# Patient Record
Sex: FEMALE | Race: WHITE | NOT HISPANIC OR LATINO | Employment: FULL TIME | URBAN - METROPOLITAN AREA
[De-identification: names, ages, dates, MRNs, and addresses within clinical notes are randomized per-mention and may not be internally consistent; named-entity substitution may affect disease eponyms.]

---

## 2017-03-27 ENCOUNTER — ALLSCRIPTS OFFICE VISIT (OUTPATIENT)
Dept: OTHER | Facility: OTHER | Age: 27
End: 2017-03-27

## 2017-04-10 ENCOUNTER — ALLSCRIPTS OFFICE VISIT (OUTPATIENT)
Dept: OTHER | Facility: OTHER | Age: 27
End: 2017-04-10

## 2017-04-10 DIAGNOSIS — Z34.01 ENCOUNTER FOR SUPERVISION OF NORMAL FIRST PREGNANCY IN FIRST TRIMESTER: ICD-10-CM

## 2017-04-10 DIAGNOSIS — Z83.3 FAMILY HISTORY OF DIABETES MELLITUS: ICD-10-CM

## 2017-04-11 LAB
EXTERNAL ABO GROUPING: NORMAL
EXTERNAL ANTIBODY SCREEN: NORMAL
EXTERNAL HEMATOCRIT: 37.5 %
EXTERNAL HEMOGLOBIN: 12.2 G/DL
EXTERNAL PLATELET COUNT: 203 K/ΜL
EXTERNAL RH FACTOR: POSITIVE
EXTERNAL RUBELLA IGG QUANTITATION: NORMAL

## 2017-04-12 ENCOUNTER — LAB CONVERSION - ENCOUNTER (OUTPATIENT)
Dept: OTHER | Facility: OTHER | Age: 27
End: 2017-04-12

## 2017-04-12 LAB
ABO GROUP BLD: NORMAL
BASOPHILS # BLD AUTO: 0.2 %
BASOPHILS # BLD AUTO: 15 CELLS/UL (ref 0–200)
BILIRUB UR QL STRIP: NEGATIVE
COLOR UR: YELLOW
COMMENT (HISTORICAL): CLEAR
DEPRECATED RDW RBC AUTO: 13.6 % (ref 11–15)
EOSINOPHIL # BLD AUTO: 1.2 %
EOSINOPHIL # BLD AUTO: 91 CELLS/UL (ref 15–500)
FECAL OCCULT BLOOD DIAGNOSTIC (HISTORICAL): NEGATIVE
GLUCOSE (HISTORICAL): NEGATIVE
GLUCOSE 1 HR 50 GM GLUC CHALLENGE-PREG PTS (HISTORICAL): 77 MG/DL
HCT VFR BLD AUTO: 37.5 % (ref 35–45)
HEPATITIS B SURFACE ANTIGEN (HISTORICAL): NORMAL
HGB BLD-MCNC: 12.2 G/DL (ref 11.7–15.5)
HIV AG/AB, 4TH GEN (HISTORICAL): NORMAL
KETONES UR STRIP-MCNC: NEGATIVE MG/DL
LEUKOCYTE ESTERASE UR QL STRIP: NEGATIVE
LYMPHOCYTES # BLD AUTO: 2257 CELLS/UL (ref 850–3900)
LYMPHOCYTES # BLD AUTO: 29.7 %
MCH RBC QN AUTO: 28.5 PG (ref 27–33)
MCHC RBC AUTO-ENTMCNC: 32.6 G/DL (ref 32–36)
MCV RBC AUTO: 87.6 FL (ref 80–100)
MONOCYTES # BLD AUTO: 578 CELLS/UL (ref 200–950)
MONOCYTES (HISTORICAL): 7.6 %
NEUTROPHILS # BLD AUTO: 4659 CELLS/UL (ref 1500–7800)
NEUTROPHILS # BLD AUTO: 61.3 %
NITRITE UR QL STRIP: NEGATIVE
PH UR STRIP.AUTO: 6.5 [PH] (ref 5–8)
PLATELET # BLD AUTO: 203 THOUSAND/UL (ref 140–400)
PMV BLD AUTO: 10.3 FL (ref 7.5–12.5)
PROT UR STRIP-MCNC: NEGATIVE MG/DL
RBC # BLD AUTO: 4.28 MILLION/UL (ref 3.8–5.1)
RH BLD: NORMAL
RPR SCREEN (HISTORICAL): NORMAL
RUBELLA, IGG (HISTORICAL): 5.56 INDEX
SP GR UR STRIP.AUTO: 1.02 (ref 1–1.03)
WBC # BLD AUTO: 7.6 THOUSAND/UL (ref 3.8–10.8)

## 2017-04-24 ENCOUNTER — LAB REQUISITION (OUTPATIENT)
Dept: LAB | Facility: HOSPITAL | Age: 27
End: 2017-04-24
Payer: COMMERCIAL

## 2017-04-24 ENCOUNTER — GENERIC CONVERSION - ENCOUNTER (OUTPATIENT)
Dept: OTHER | Facility: OTHER | Age: 27
End: 2017-04-24

## 2017-04-24 DIAGNOSIS — Z34.01 ENCOUNTER FOR SUPERVISION OF NORMAL FIRST PREGNANCY IN FIRST TRIMESTER: ICD-10-CM

## 2017-04-24 PROCEDURE — 87591 N.GONORRHOEAE DNA AMP PROB: CPT | Performed by: OBSTETRICS & GYNECOLOGY

## 2017-04-24 PROCEDURE — 87491 CHLMYD TRACH DNA AMP PROBE: CPT | Performed by: OBSTETRICS & GYNECOLOGY

## 2017-04-24 PROCEDURE — G0145 SCR C/V CYTO,THINLAYER,RESCR: HCPCS | Performed by: OBSTETRICS & GYNECOLOGY

## 2017-04-26 LAB
CHLAMYDIA DNA CVX QL NAA+PROBE: NORMAL
N GONORRHOEA DNA GENITAL QL NAA+PROBE: NORMAL

## 2017-05-01 LAB
LAB AP GYN PRIMARY INTERPRETATION: NORMAL
LAB AP LMP: NORMAL
Lab: NORMAL

## 2017-05-02 ENCOUNTER — GENERIC CONVERSION - ENCOUNTER (OUTPATIENT)
Dept: OTHER | Facility: OTHER | Age: 27
End: 2017-05-02

## 2017-05-02 ENCOUNTER — ALLSCRIPTS OFFICE VISIT (OUTPATIENT)
Dept: PERINATAL CARE | Facility: CLINIC | Age: 27
End: 2017-05-02
Payer: COMMERCIAL

## 2017-05-02 PROCEDURE — 76801 OB US < 14 WKS SINGLE FETUS: CPT | Performed by: OBSTETRICS & GYNECOLOGY

## 2017-05-14 ENCOUNTER — HOSPITAL ENCOUNTER (EMERGENCY)
Facility: HOSPITAL | Age: 27
Discharge: HOME | End: 2017-05-14
Attending: EMERGENCY MEDICINE | Admitting: EMERGENCY MEDICINE
Payer: OTHER MISCELLANEOUS

## 2017-05-14 VITALS
OXYGEN SATURATION: 100 % | BODY MASS INDEX: 24.75 KG/M2 | WEIGHT: 145 LBS | HEIGHT: 64 IN | SYSTOLIC BLOOD PRESSURE: 124 MMHG | RESPIRATION RATE: 18 BRPM | DIASTOLIC BLOOD PRESSURE: 73 MMHG | HEART RATE: 64 BPM | TEMPERATURE: 98.3 F

## 2017-05-14 DIAGNOSIS — Z77.21 EXPOSURE TO BLOOD OR BODY FLUID: Primary | ICD-10-CM

## 2017-05-14 LAB
ALBUMIN SERPL BCP-MCNC: 3.5 G/DL (ref 3.5–5)
ALP SERPL-CCNC: 31 U/L (ref 46–116)
ALT SERPL W P-5'-P-CCNC: 20 U/L (ref 12–78)
ALT SERPL W P-5'-P-CCNC: 20 U/L (ref 12–78)
ANION GAP SERPL CALCULATED.3IONS-SCNC: 11 MMOL/L (ref 4–13)
AST SERPL W P-5'-P-CCNC: 19 U/L (ref 5–45)
BASOPHILS # BLD AUTO: 0.02 THOUSANDS/ΜL (ref 0–0.1)
BASOPHILS NFR BLD AUTO: 0 % (ref 0–1)
BILIRUB SERPL-MCNC: 0.2 MG/DL (ref 0.2–1)
BUN SERPL-MCNC: 12 MG/DL (ref 5–25)
CALCIUM SERPL-MCNC: 9.1 MG/DL (ref 8.3–10.1)
CHLORIDE SERPL-SCNC: 104 MMOL/L (ref 100–108)
CO2 SERPL-SCNC: 22 MMOL/L (ref 21–32)
CREAT SERPL-MCNC: 0.44 MG/DL (ref 0.6–1.3)
EOSINOPHIL # BLD AUTO: 0.05 THOUSAND/ΜL (ref 0–0.61)
EOSINOPHIL NFR BLD AUTO: 1 % (ref 0–6)
ERYTHROCYTE [DISTWIDTH] IN BLOOD BY AUTOMATED COUNT: 13.7 % (ref 11.6–15.1)
GFR SERPL CREATININE-BSD FRML MDRD: >60 ML/MIN/1.73SQ M
GLUCOSE SERPL-MCNC: 93 MG/DL (ref 65–140)
HCT VFR BLD AUTO: 35.9 % (ref 34.8–46.1)
HGB BLD-MCNC: 11.8 G/DL (ref 11.5–15.4)
LYMPHOCYTES # BLD AUTO: 2.08 THOUSANDS/ΜL (ref 0.6–4.47)
LYMPHOCYTES NFR BLD AUTO: 23 % (ref 14–44)
MCH RBC QN AUTO: 28.5 PG (ref 26.8–34.3)
MCHC RBC AUTO-ENTMCNC: 32.9 G/DL (ref 31.4–37.4)
MCV RBC AUTO: 87 FL (ref 82–98)
MONOCYTES # BLD AUTO: 0.59 THOUSAND/ΜL (ref 0.17–1.22)
MONOCYTES NFR BLD AUTO: 7 % (ref 4–12)
NEUTROPHILS # BLD AUTO: 6.33 THOUSANDS/ΜL (ref 1.85–7.62)
NEUTS SEG NFR BLD AUTO: 69 % (ref 43–75)
NRBC BLD AUTO-RTO: 0 /100 WBCS
PLATELET # BLD AUTO: 206 THOUSANDS/UL (ref 149–390)
PMV BLD AUTO: 10.6 FL (ref 8.9–12.7)
POTASSIUM SERPL-SCNC: 3.8 MMOL/L (ref 3.5–5.3)
PROT SERPL-MCNC: 7.4 G/DL (ref 6.4–8.2)
RBC # BLD AUTO: 4.14 MILLION/UL (ref 3.81–5.12)
SODIUM SERPL-SCNC: 137 MMOL/L (ref 136–145)
WBC # BLD AUTO: 9.13 THOUSAND/UL (ref 4.31–10.16)

## 2017-05-14 PROCEDURE — 86706 HEP B SURFACE ANTIBODY: CPT | Performed by: EMERGENCY MEDICINE

## 2017-05-14 PROCEDURE — 80053 COMPREHEN METABOLIC PANEL: CPT | Performed by: EMERGENCY MEDICINE

## 2017-05-14 PROCEDURE — 85025 COMPLETE CBC W/AUTO DIFF WBC: CPT | Performed by: EMERGENCY MEDICINE

## 2017-05-14 PROCEDURE — 99283 EMERGENCY DEPT VISIT LOW MDM: CPT

## 2017-05-14 PROCEDURE — 87340 HEPATITIS B SURFACE AG IA: CPT | Performed by: EMERGENCY MEDICINE

## 2017-05-14 PROCEDURE — 84460 ALANINE AMINO (ALT) (SGPT): CPT | Performed by: EMERGENCY MEDICINE

## 2017-05-14 PROCEDURE — 87389 HIV-1 AG W/HIV-1&-2 AB AG IA: CPT | Performed by: EMERGENCY MEDICINE

## 2017-05-14 PROCEDURE — 36415 COLL VENOUS BLD VENIPUNCTURE: CPT | Performed by: EMERGENCY MEDICINE

## 2017-05-14 PROCEDURE — 86803 HEPATITIS C AB TEST: CPT | Performed by: EMERGENCY MEDICINE

## 2017-05-14 RX ORDER — EMTRICITABINE AND TENOFOVIR DISOPROXIL FUMARATE 200; 300 MG/1; MG/1
1 TABLET, FILM COATED ORAL DAILY
Qty: 30 TABLET | Refills: 0 | Status: SHIPPED | OUTPATIENT
Start: 2017-05-14 | End: 2017-11-03 | Stop reason: HOSPADM

## 2017-05-14 RX ADMIN — RALTEGRAVIR 400 MG: 400 TABLET, FILM COATED ORAL at 22:39

## 2017-05-14 RX ADMIN — EMTRICITABINE: 200 CAPSULE ORAL at 22:45

## 2017-05-15 LAB — HIV 1+2 AB+HIV1 P24 AG SERPL QL IA: NORMAL

## 2017-05-16 LAB
HBV SURFACE AB SER-ACNC: 79.9 MIU/ML
HBV SURFACE AG SER QL: NORMAL
HCV AB SER QL: NORMAL

## 2017-05-25 ENCOUNTER — ALLSCRIPTS OFFICE VISIT (OUTPATIENT)
Dept: OTHER | Facility: OTHER | Age: 27
End: 2017-05-25

## 2017-06-19 ENCOUNTER — GENERIC CONVERSION - ENCOUNTER (OUTPATIENT)
Dept: OTHER | Facility: OTHER | Age: 27
End: 2017-06-19

## 2017-06-19 ENCOUNTER — ALLSCRIPTS OFFICE VISIT (OUTPATIENT)
Dept: PERINATAL CARE | Facility: CLINIC | Age: 27
End: 2017-06-19
Payer: COMMERCIAL

## 2017-06-19 PROCEDURE — 76805 OB US >/= 14 WKS SNGL FETUS: CPT | Performed by: OBSTETRICS & GYNECOLOGY

## 2017-06-19 PROCEDURE — 76817 TRANSVAGINAL US OBSTETRIC: CPT | Performed by: OBSTETRICS & GYNECOLOGY

## 2017-06-26 ENCOUNTER — TRANSCRIBE ORDERS (OUTPATIENT)
Dept: ADMINISTRATIVE | Age: 27
End: 2017-06-26

## 2017-06-26 ENCOUNTER — APPOINTMENT (OUTPATIENT)
Dept: LAB | Age: 27
End: 2017-06-26
Payer: OTHER MISCELLANEOUS

## 2017-06-26 DIAGNOSIS — Z20.828 EXPOSURE TO SARS-ASSOCIATED CORONAVIRUS: Primary | ICD-10-CM

## 2017-06-26 DIAGNOSIS — Z20.828 EXPOSURE TO SARS-ASSOCIATED CORONAVIRUS: ICD-10-CM

## 2017-06-26 LAB
ALBUMIN SERPL BCP-MCNC: 2.9 G/DL (ref 3.5–5)
ALP SERPL-CCNC: 37 U/L (ref 46–116)
ALT SERPL W P-5'-P-CCNC: 17 U/L (ref 12–78)
ANION GAP SERPL CALCULATED.3IONS-SCNC: 8 MMOL/L (ref 4–13)
AST SERPL W P-5'-P-CCNC: 15 U/L (ref 5–45)
BASOPHILS # BLD AUTO: 0.01 THOUSANDS/ΜL (ref 0–0.1)
BASOPHILS NFR BLD AUTO: 0 % (ref 0–1)
BILIRUB SERPL-MCNC: 0.2 MG/DL (ref 0.2–1)
BUN SERPL-MCNC: 11 MG/DL (ref 5–25)
CALCIUM SERPL-MCNC: 8.7 MG/DL (ref 8.3–10.1)
CHLORIDE SERPL-SCNC: 110 MMOL/L (ref 100–108)
CO2 SERPL-SCNC: 23 MMOL/L (ref 21–32)
CREAT SERPL-MCNC: 0.41 MG/DL (ref 0.6–1.3)
EOSINOPHIL # BLD AUTO: 0.08 THOUSAND/ΜL (ref 0–0.61)
EOSINOPHIL NFR BLD AUTO: 1 % (ref 0–6)
ERYTHROCYTE [DISTWIDTH] IN BLOOD BY AUTOMATED COUNT: 14.1 % (ref 11.6–15.1)
GFR SERPL CREATININE-BSD FRML MDRD: >60 ML/MIN/1.73SQ M
GLUCOSE SERPL-MCNC: 79 MG/DL (ref 65–140)
HCT VFR BLD AUTO: 35.1 % (ref 34.8–46.1)
HGB BLD-MCNC: 11.5 G/DL (ref 11.5–15.4)
LYMPHOCYTES # BLD AUTO: 1.49 THOUSANDS/ΜL (ref 0.6–4.47)
LYMPHOCYTES NFR BLD AUTO: 20 % (ref 14–44)
MCH RBC QN AUTO: 28.8 PG (ref 26.8–34.3)
MCHC RBC AUTO-ENTMCNC: 32.8 G/DL (ref 31.4–37.4)
MCV RBC AUTO: 88 FL (ref 82–98)
MONOCYTES # BLD AUTO: 0.6 THOUSAND/ΜL (ref 0.17–1.22)
MONOCYTES NFR BLD AUTO: 8 % (ref 4–12)
NEUTROPHILS # BLD AUTO: 5.33 THOUSANDS/ΜL (ref 1.85–7.62)
NEUTS SEG NFR BLD AUTO: 71 % (ref 43–75)
NRBC BLD AUTO-RTO: 0 /100 WBCS
PLATELET # BLD AUTO: 189 THOUSANDS/UL (ref 149–390)
PMV BLD AUTO: 11.7 FL (ref 8.9–12.7)
POTASSIUM SERPL-SCNC: 4.2 MMOL/L (ref 3.5–5.3)
PROT SERPL-MCNC: 6.5 G/DL (ref 6.4–8.2)
RBC # BLD AUTO: 4 MILLION/UL (ref 3.81–5.12)
SODIUM SERPL-SCNC: 141 MMOL/L (ref 136–145)
WBC # BLD AUTO: 7.53 THOUSAND/UL (ref 4.31–10.16)

## 2017-06-26 PROCEDURE — 36415 COLL VENOUS BLD VENIPUNCTURE: CPT

## 2017-06-26 PROCEDURE — 80053 COMPREHEN METABOLIC PANEL: CPT

## 2017-06-26 PROCEDURE — 85025 COMPLETE CBC W/AUTO DIFF WBC: CPT

## 2017-06-26 PROCEDURE — 87389 HIV-1 AG W/HIV-1&-2 AB AG IA: CPT

## 2017-06-28 LAB — HIV 1+2 AB+HIV1 P24 AG SERPL QL IA: NORMAL

## 2017-07-17 ENCOUNTER — GENERIC CONVERSION - ENCOUNTER (OUTPATIENT)
Dept: OTHER | Facility: OTHER | Age: 27
End: 2017-07-17

## 2017-07-17 DIAGNOSIS — Z34.82 ENCOUNTER FOR SUPERVISION OF OTHER NORMAL PREGNANCY, SECOND TRIMESTER: ICD-10-CM

## 2017-07-31 ENCOUNTER — APPOINTMENT (OUTPATIENT)
Dept: LAB | Age: 27
End: 2017-07-31
Payer: COMMERCIAL

## 2017-07-31 ENCOUNTER — TRANSCRIBE ORDERS (OUTPATIENT)
Dept: ADMINISTRATIVE | Age: 27
End: 2017-07-31

## 2017-07-31 DIAGNOSIS — Z34.82 ENCOUNTER FOR SUPERVISION OF OTHER NORMAL PREGNANCY, SECOND TRIMESTER: ICD-10-CM

## 2017-07-31 DIAGNOSIS — Z34.01 ENCOUNTER FOR SUPERVISION OF NORMAL FIRST PREGNANCY IN FIRST TRIMESTER: ICD-10-CM

## 2017-07-31 LAB
ABO GROUP BLD: NORMAL
BASOPHILS # BLD AUTO: 0 THOUSANDS/ΜL (ref 0–0.1)
BASOPHILS NFR BLD AUTO: 0 % (ref 0–1)
BLD GP AB SCN SERPL QL: NEGATIVE
EOSINOPHIL # BLD AUTO: 0.02 THOUSAND/ΜL (ref 0–0.61)
EOSINOPHIL NFR BLD AUTO: 0 % (ref 0–6)
ERYTHROCYTE [DISTWIDTH] IN BLOOD BY AUTOMATED COUNT: 13.7 % (ref 11.6–15.1)
GLUCOSE 1H P 50 G GLC PO SERPL-MCNC: 77 MG/DL
HCT VFR BLD AUTO: 30.9 % (ref 34.8–46.1)
HGB BLD-MCNC: 10.2 G/DL (ref 11.5–15.4)
LYMPHOCYTES # BLD AUTO: 1.2 THOUSANDS/ΜL (ref 0.6–4.47)
LYMPHOCYTES NFR BLD AUTO: 17 % (ref 14–44)
MCH RBC QN AUTO: 28.9 PG (ref 26.8–34.3)
MCHC RBC AUTO-ENTMCNC: 33 G/DL (ref 31.4–37.4)
MCV RBC AUTO: 88 FL (ref 82–98)
MONOCYTES # BLD AUTO: 0.54 THOUSAND/ΜL (ref 0.17–1.22)
MONOCYTES NFR BLD AUTO: 8 % (ref 4–12)
NEUTROPHILS # BLD AUTO: 5.31 THOUSANDS/ΜL (ref 1.85–7.62)
NEUTS SEG NFR BLD AUTO: 75 % (ref 43–75)
NRBC BLD AUTO-RTO: 0 /100 WBCS
PLATELET # BLD AUTO: 183 THOUSANDS/UL (ref 149–390)
PMV BLD AUTO: 11.8 FL (ref 8.9–12.7)
RBC # BLD AUTO: 3.53 MILLION/UL (ref 3.81–5.12)
RH BLD: POSITIVE
SPECIMEN EXPIRATION DATE: NORMAL
WBC # BLD AUTO: 7.09 THOUSAND/UL (ref 4.31–10.16)

## 2017-07-31 PROCEDURE — 86592 SYPHILIS TEST NON-TREP QUAL: CPT

## 2017-07-31 PROCEDURE — 85025 COMPLETE CBC W/AUTO DIFF WBC: CPT

## 2017-07-31 PROCEDURE — 87086 URINE CULTURE/COLONY COUNT: CPT

## 2017-07-31 PROCEDURE — 86900 BLOOD TYPING SEROLOGIC ABO: CPT

## 2017-07-31 PROCEDURE — 86901 BLOOD TYPING SEROLOGIC RH(D): CPT

## 2017-07-31 PROCEDURE — 36415 COLL VENOUS BLD VENIPUNCTURE: CPT

## 2017-07-31 PROCEDURE — 86850 RBC ANTIBODY SCREEN: CPT

## 2017-07-31 PROCEDURE — 82950 GLUCOSE TEST: CPT

## 2017-08-01 LAB
BACTERIA UR CULT: NORMAL
RPR SER QL: NORMAL

## 2017-08-14 ENCOUNTER — GENERIC CONVERSION - ENCOUNTER (OUTPATIENT)
Dept: OTHER | Facility: OTHER | Age: 27
End: 2017-08-14

## 2017-08-14 ENCOUNTER — ALLSCRIPTS OFFICE VISIT (OUTPATIENT)
Dept: PERINATAL CARE | Facility: CLINIC | Age: 27
End: 2017-08-14
Payer: COMMERCIAL

## 2017-08-14 PROCEDURE — 76817 TRANSVAGINAL US OBSTETRIC: CPT | Performed by: OBSTETRICS & GYNECOLOGY

## 2017-08-14 PROCEDURE — 76816 OB US FOLLOW-UP PER FETUS: CPT | Performed by: OBSTETRICS & GYNECOLOGY

## 2017-09-21 ENCOUNTER — ALLSCRIPTS OFFICE VISIT (OUTPATIENT)
Dept: OTHER | Facility: OTHER | Age: 27
End: 2017-09-21

## 2017-10-03 ENCOUNTER — GENERIC CONVERSION - ENCOUNTER (OUTPATIENT)
Dept: OTHER | Facility: OTHER | Age: 27
End: 2017-10-03

## 2017-10-09 ENCOUNTER — GENERIC CONVERSION - ENCOUNTER (OUTPATIENT)
Dept: OTHER | Facility: OTHER | Age: 27
End: 2017-10-09

## 2017-10-09 ENCOUNTER — TRANSCRIBE ORDERS (OUTPATIENT)
Dept: ADMINISTRATIVE | Age: 27
End: 2017-10-09

## 2017-10-09 ENCOUNTER — APPOINTMENT (OUTPATIENT)
Dept: LAB | Age: 27
End: 2017-10-09
Payer: COMMERCIAL

## 2017-10-09 ENCOUNTER — ALLSCRIPTS OFFICE VISIT (OUTPATIENT)
Dept: PERINATAL CARE | Facility: CLINIC | Age: 27
End: 2017-10-09
Payer: COMMERCIAL

## 2017-10-09 DIAGNOSIS — O36.63X0 MATERNAL CARE FOR EXCESSIVE FETAL GROWTH IN THIRD TRIMESTER: ICD-10-CM

## 2017-10-09 LAB — GLUCOSE 1H P 50 G GLC PO SERPL-MCNC: 103 MG/DL

## 2017-10-09 PROCEDURE — 76816 OB US FOLLOW-UP PER FETUS: CPT | Performed by: OBSTETRICS & GYNECOLOGY

## 2017-10-09 PROCEDURE — 82950 GLUCOSE TEST: CPT

## 2017-10-09 PROCEDURE — 36415 COLL VENOUS BLD VENIPUNCTURE: CPT

## 2017-10-10 ENCOUNTER — GENERIC CONVERSION - ENCOUNTER (OUTPATIENT)
Dept: OTHER | Facility: OTHER | Age: 27
End: 2017-10-10

## 2017-10-11 ENCOUNTER — LAB REQUISITION (OUTPATIENT)
Dept: LAB | Facility: HOSPITAL | Age: 27
End: 2017-10-11
Payer: COMMERCIAL

## 2017-10-11 DIAGNOSIS — Z34.90 ENCOUNTER FOR SUPERVISION OF NORMAL PREGNANCY: ICD-10-CM

## 2017-10-11 PROCEDURE — 87653 STREP B DNA AMP PROBE: CPT | Performed by: OBSTETRICS & GYNECOLOGY

## 2017-10-13 LAB — GP B STREP DNA SPEC QL NAA+PROBE: NORMAL

## 2017-10-16 ENCOUNTER — GENERIC CONVERSION - ENCOUNTER (OUTPATIENT)
Dept: OTHER | Facility: OTHER | Age: 27
End: 2017-10-16

## 2017-10-18 ENCOUNTER — GENERIC CONVERSION - ENCOUNTER (OUTPATIENT)
Dept: OTHER | Facility: OTHER | Age: 27
End: 2017-10-18

## 2017-10-25 ENCOUNTER — GENERIC CONVERSION - ENCOUNTER (OUTPATIENT)
Dept: OTHER | Facility: OTHER | Age: 27
End: 2017-10-25

## 2017-10-26 ENCOUNTER — ALLSCRIPTS OFFICE VISIT (OUTPATIENT)
Dept: PERINATAL CARE | Facility: CLINIC | Age: 27
End: 2017-10-26
Payer: COMMERCIAL

## 2017-10-26 ENCOUNTER — GENERIC CONVERSION - ENCOUNTER (OUTPATIENT)
Dept: OTHER | Facility: OTHER | Age: 27
End: 2017-10-26

## 2017-10-26 PROCEDURE — 76816 OB US FOLLOW-UP PER FETUS: CPT | Performed by: OBSTETRICS & GYNECOLOGY

## 2017-10-31 ENCOUNTER — HOSPITAL ENCOUNTER (INPATIENT)
Facility: HOSPITAL | Age: 27
LOS: 2 days | Discharge: HOME | End: 2017-11-02
Attending: OBSTETRICS & GYNECOLOGY | Admitting: OBSTETRICS & GYNECOLOGY
Payer: COMMERCIAL

## 2017-10-31 ENCOUNTER — GENERIC CONVERSION - ENCOUNTER (OUTPATIENT)
Dept: OTHER | Facility: OTHER | Age: 27
End: 2017-10-31

## 2017-10-31 DIAGNOSIS — Z3A.39 39 WEEKS GESTATION OF PREGNANCY: Primary | ICD-10-CM

## 2017-10-31 LAB
ABO GROUP BLD: NORMAL
BASOPHILS # BLD AUTO: 0.01 THOUSANDS/ΜL (ref 0–0.1)
BASOPHILS NFR BLD AUTO: 0 % (ref 0–1)
BLD GP AB SCN SERPL QL: NEGATIVE
EOSINOPHIL # BLD AUTO: 0.03 THOUSAND/ΜL (ref 0–0.61)
EOSINOPHIL NFR BLD AUTO: 0 % (ref 0–6)
ERYTHROCYTE [DISTWIDTH] IN BLOOD BY AUTOMATED COUNT: 15.2 % (ref 11.6–15.1)
HCT VFR BLD AUTO: 31.4 % (ref 34.8–46.1)
HGB BLD-MCNC: 10.3 G/DL (ref 11.5–15.4)
LYMPHOCYTES # BLD AUTO: 1.99 THOUSANDS/ΜL (ref 0.6–4.47)
LYMPHOCYTES NFR BLD AUTO: 21 % (ref 14–44)
MCH RBC QN AUTO: 27.2 PG (ref 26.8–34.3)
MCHC RBC AUTO-ENTMCNC: 32.8 G/DL (ref 31.4–37.4)
MCV RBC AUTO: 83 FL (ref 82–98)
MONOCYTES # BLD AUTO: 0.72 THOUSAND/ΜL (ref 0.17–1.22)
MONOCYTES NFR BLD AUTO: 8 % (ref 4–12)
NEUTROPHILS # BLD AUTO: 6.53 THOUSANDS/ΜL (ref 1.85–7.62)
NEUTS SEG NFR BLD AUTO: 71 % (ref 43–75)
NRBC BLD AUTO-RTO: 0 /100 WBCS
PLATELET # BLD AUTO: 181 THOUSANDS/UL (ref 149–390)
PMV BLD AUTO: 12.2 FL (ref 8.9–12.7)
RBC # BLD AUTO: 3.78 MILLION/UL (ref 3.81–5.12)
RH BLD: POSITIVE
SPECIMEN EXPIRATION DATE: NORMAL
WBC # BLD AUTO: 9.31 THOUSAND/UL (ref 4.31–10.16)

## 2017-10-31 PROCEDURE — 4A0HXCZ MEASUREMENT OF PRODUCTS OF CONCEPTION, CARDIAC RATE, EXTERNAL APPROACH: ICD-10-PCS | Performed by: OBSTETRICS & GYNECOLOGY

## 2017-10-31 PROCEDURE — 86901 BLOOD TYPING SEROLOGIC RH(D): CPT | Performed by: OBSTETRICS & GYNECOLOGY

## 2017-10-31 PROCEDURE — 86592 SYPHILIS TEST NON-TREP QUAL: CPT | Performed by: OBSTETRICS & GYNECOLOGY

## 2017-10-31 PROCEDURE — 86900 BLOOD TYPING SEROLOGIC ABO: CPT | Performed by: OBSTETRICS & GYNECOLOGY

## 2017-10-31 PROCEDURE — 86850 RBC ANTIBODY SCREEN: CPT | Performed by: OBSTETRICS & GYNECOLOGY

## 2017-10-31 PROCEDURE — 85025 COMPLETE CBC W/AUTO DIFF WBC: CPT | Performed by: OBSTETRICS & GYNECOLOGY

## 2017-10-31 RX ORDER — SODIUM CHLORIDE, SODIUM LACTATE, POTASSIUM CHLORIDE, CALCIUM CHLORIDE 600; 310; 30; 20 MG/100ML; MG/100ML; MG/100ML; MG/100ML
125 INJECTION, SOLUTION INTRAVENOUS CONTINUOUS
Status: DISCONTINUED | OUTPATIENT
Start: 2017-10-31 | End: 2017-11-01

## 2017-10-31 RX ADMIN — MISOPROSTOL 25 MCG: 100 TABLET ORAL at 22:02

## 2017-11-01 ENCOUNTER — ANESTHESIA EVENT (INPATIENT)
Dept: LABOR AND DELIVERY | Facility: HOSPITAL | Age: 27
End: 2017-11-01
Payer: COMMERCIAL

## 2017-11-01 ENCOUNTER — ANESTHESIA (INPATIENT)
Dept: LABOR AND DELIVERY | Facility: HOSPITAL | Age: 27
End: 2017-11-01
Payer: COMMERCIAL

## 2017-11-01 PROBLEM — Z3A.39 39 WEEKS GESTATION OF PREGNANCY: Status: ACTIVE | Noted: 2017-11-01

## 2017-11-01 PROBLEM — K90.0 CD (CELIAC DISEASE): Status: ACTIVE | Noted: 2017-11-01

## 2017-11-01 PROBLEM — O36.63X0 LARGE FOR GESTATIONAL AGE FETUS AFFECTING MOTHER, ANTEPARTUM, THIRD TRIMESTER, SINGLE GESTATION: Status: ACTIVE | Noted: 2017-11-01

## 2017-11-01 LAB
BASE EXCESS BLDCOA CALC-SCNC: -3.9 MMOL/L (ref 3–11)
BASE EXCESS BLDCOV CALC-SCNC: -2.7 MMOL/L (ref 1–9)
HCO3 BLDCOA-SCNC: 23.1 MMOL/L (ref 17.3–27.3)
HCO3 BLDCOV-SCNC: 21.7 MMOL/L (ref 12.2–28.6)
O2 CT VFR BLDCOA CALC: 7.8 ML/DL
OXYHGB MFR BLDCOA: 33.6 %
OXYHGB MFR BLDCOV: 61.3 %
PCO2 BLDCOA: 49.1 MM[HG] (ref 30–60)
PCO2 BLDCOV: 36.9 MM HG (ref 27–43)
PH BLDCOA: 7.29 [PH] (ref 7.23–7.43)
PH BLDCOV: 7.39 [PH] (ref 7.19–7.49)
PO2 BLDCOA: 17.2 MM HG (ref 5–25)
PO2 BLDCOV: 24.7 MM HG (ref 15–45)
RPR SER QL: NORMAL
SAO2 % BLDCOV: 13.9 ML/DL

## 2017-11-01 PROCEDURE — 0HQ9XZZ REPAIR PERINEUM SKIN, EXTERNAL APPROACH: ICD-10-PCS | Performed by: OBSTETRICS & GYNECOLOGY

## 2017-11-01 PROCEDURE — 10907ZC DRAINAGE OF AMNIOTIC FLUID, THERAPEUTIC FROM PRODUCTS OF CONCEPTION, VIA NATURAL OR ARTIFICIAL OPENING: ICD-10-PCS | Performed by: OBSTETRICS & GYNECOLOGY

## 2017-11-01 PROCEDURE — 3E0P3VZ INTRODUCTION OF HORMONE INTO FEMALE REPRODUCTIVE, PERCUTANEOUS APPROACH: ICD-10-PCS | Performed by: OBSTETRICS & GYNECOLOGY

## 2017-11-01 PROCEDURE — 82805 BLOOD GASES W/O2 SATURATION: CPT | Performed by: OBSTETRICS & GYNECOLOGY

## 2017-11-01 RX ORDER — OXYCODONE HYDROCHLORIDE AND ACETAMINOPHEN 5; 325 MG/1; MG/1
1 TABLET ORAL EVERY 4 HOURS PRN
Status: DISCONTINUED | OUTPATIENT
Start: 2017-11-01 | End: 2017-11-03 | Stop reason: HOSPADM

## 2017-11-01 RX ORDER — SENNOSIDES 8.6 MG
1 TABLET ORAL
Status: DISCONTINUED | OUTPATIENT
Start: 2017-11-01 | End: 2017-11-03 | Stop reason: HOSPADM

## 2017-11-01 RX ORDER — DOCUSATE SODIUM 100 MG/1
100 CAPSULE, LIQUID FILLED ORAL 2 TIMES DAILY
Status: DISCONTINUED | OUTPATIENT
Start: 2017-11-01 | End: 2017-11-03 | Stop reason: HOSPADM

## 2017-11-01 RX ORDER — DIAPER,BRIEF,INFANT-TODD,DISP
1 EACH MISCELLANEOUS AS NEEDED
Status: DISCONTINUED | OUTPATIENT
Start: 2017-11-01 | End: 2017-11-03 | Stop reason: HOSPADM

## 2017-11-01 RX ORDER — SIMETHICONE 80 MG
80 TABLET,CHEWABLE ORAL 4 TIMES DAILY PRN
Status: DISCONTINUED | OUTPATIENT
Start: 2017-11-01 | End: 2017-11-03 | Stop reason: HOSPADM

## 2017-11-01 RX ORDER — FENTANYL CITRATE 50 UG/ML
INJECTION, SOLUTION INTRAMUSCULAR; INTRAVENOUS
Status: COMPLETED
Start: 2017-11-01 | End: 2017-11-01

## 2017-11-01 RX ORDER — FERROUS SULFATE 325(65) MG
1 TABLET ORAL DAILY
COMMUNITY
End: 2019-02-05

## 2017-11-01 RX ORDER — DIPHENHYDRAMINE HYDROCHLORIDE 50 MG/ML
25 INJECTION INTRAMUSCULAR; INTRAVENOUS EVERY 6 HOURS PRN
Status: DISCONTINUED | OUTPATIENT
Start: 2017-11-01 | End: 2017-11-01

## 2017-11-01 RX ORDER — OXYCODONE HYDROCHLORIDE AND ACETAMINOPHEN 5; 325 MG/1; MG/1
2 TABLET ORAL EVERY 4 HOURS PRN
Status: DISCONTINUED | OUTPATIENT
Start: 2017-11-01 | End: 2017-11-03 | Stop reason: HOSPADM

## 2017-11-01 RX ORDER — FENTANYL CITRATE 50 UG/ML
INJECTION, SOLUTION INTRAMUSCULAR; INTRAVENOUS AS NEEDED
Status: DISCONTINUED | OUTPATIENT
Start: 2017-11-01 | End: 2017-11-01 | Stop reason: SURG

## 2017-11-01 RX ORDER — ACETAMINOPHEN 325 MG/1
650 TABLET ORAL EVERY 4 HOURS PRN
Status: DISCONTINUED | OUTPATIENT
Start: 2017-11-01 | End: 2017-11-03 | Stop reason: HOSPADM

## 2017-11-01 RX ORDER — ONDANSETRON 2 MG/ML
4 INJECTION INTRAMUSCULAR; INTRAVENOUS EVERY 4 HOURS PRN
Status: DISCONTINUED | OUTPATIENT
Start: 2017-11-01 | End: 2017-11-01

## 2017-11-01 RX ORDER — OXYTOCIN/RINGER'S LACTATE 30/500 ML
1-30 PLASTIC BAG, INJECTION (ML) INTRAVENOUS
Status: DISCONTINUED | OUTPATIENT
Start: 2017-11-01 | End: 2017-11-01

## 2017-11-01 RX ORDER — BUTORPHANOL TARTRATE 1 MG/ML
1 INJECTION, SOLUTION INTRAMUSCULAR; INTRAVENOUS
Status: DISCONTINUED | OUTPATIENT
Start: 2017-11-01 | End: 2017-11-01

## 2017-11-01 RX ORDER — METOCLOPRAMIDE HYDROCHLORIDE 5 MG/ML
5 INJECTION INTRAMUSCULAR; INTRAVENOUS EVERY 6 HOURS PRN
Status: DISCONTINUED | OUTPATIENT
Start: 2017-11-01 | End: 2017-11-01

## 2017-11-01 RX ORDER — IBUPROFEN 600 MG/1
600 TABLET ORAL EVERY 6 HOURS PRN
Status: DISCONTINUED | OUTPATIENT
Start: 2017-11-01 | End: 2017-11-03 | Stop reason: HOSPADM

## 2017-11-01 RX ORDER — BUPIVACAINE HYDROCHLORIDE 2.5 MG/ML
INJECTION, SOLUTION INFILTRATION; PERINEURAL AS NEEDED
Status: DISCONTINUED | OUTPATIENT
Start: 2017-11-01 | End: 2017-11-01 | Stop reason: SURG

## 2017-11-01 RX ADMIN — BUTORPHANOL TARTRATE 1 MG: 1 INJECTION, SOLUTION INTRAMUSCULAR; INTRAVENOUS at 05:26

## 2017-11-01 RX ADMIN — BUPIVACAINE HYDROCHLORIDE 1 ML: 2.5 INJECTION, SOLUTION INFILTRATION; PERINEURAL at 08:58

## 2017-11-01 RX ADMIN — Medication: at 08:45

## 2017-11-01 RX ADMIN — DOCUSATE SODIUM 100 MG: 100 CAPSULE, LIQUID FILLED ORAL at 18:43

## 2017-11-01 RX ADMIN — FENTANYL CITRATE 10 MCG: 50 INJECTION, SOLUTION INTRAMUSCULAR; INTRAVENOUS at 08:58

## 2017-11-01 RX ADMIN — SODIUM CHLORIDE, SODIUM LACTATE, POTASSIUM CHLORIDE, AND CALCIUM CHLORIDE 125 ML/HR: .6; .31; .03; .02 INJECTION, SOLUTION INTRAVENOUS at 11:41

## 2017-11-01 RX ADMIN — ONDANSETRON 4 MG: 2 INJECTION INTRAMUSCULAR; INTRAVENOUS at 11:06

## 2017-11-01 RX ADMIN — Medication 2 MILLI-UNITS/MIN: at 13:39

## 2017-11-01 RX ADMIN — SODIUM CHLORIDE, SODIUM LACTATE, POTASSIUM CHLORIDE, AND CALCIUM CHLORIDE 125 ML/HR: .6; .31; .03; .02 INJECTION, SOLUTION INTRAVENOUS at 08:29

## 2017-11-01 NOTE — OB LABOR/OXYTOCIN SAFETY PROGRESS
Labor Progress Note - Thania Carroll 33 y o  female MRN: [id number]    Unit/Bed#: -01 Encounter: [id number]    Obstetric History       T0      L0     SAB0   TAB0   Ectopic0   Multiple0   Live Births0      Gestational Age: 38w3d     Contraction Frequency (minutes): 2-5  Contraction Quality: Moderate  Tachysystole: No   Dilation: 6        Effacement (%): 100  Station: 0  Baseline Rate: 130 bpm  Fetal Heart Rate: 129 BPM  FHR Category: Category I          Notes/comments:     Patient comfortable after epidural  FHR tracing shows no evidence of hypoxemia  AROM done without complications for meconium stained fluid  Will continue to manage expectantly at this point      Dottie Bravo MD 2017 10:37 AM

## 2017-11-01 NOTE — OB LABOR/OXYTOCIN SAFETY PROGRESS
Oxytocin Safety Progress Check Note - Gregory Mary 33 y o  female MRN: [id number]    Unit/Bed#: -01 Encounter: [id number]    Obstetric History       T0      L0     SAB0   TAB0   Ectopic0   Multiple0   Live Births0      Gestational Age: 39w3d  Dose (flor-units/min) Oxytocin: 1 flor-units/min  Contraction Frequency (minutes): 1-3  Contraction Quality: Moderate  Tachysystole: No   Dilation: 10  Dilation Complete Date: 17  Dilation Complete Time: 1525  Effacement (%): 100  Station: 2  Baseline Rate: 140 bpm  Fetal Heart Rate: 150 BPM  FHR Category: Category I          Notes/comments:   Pt comfortable with epidural  Complete and +2, will start pushing  FHT Cat I currently but with rare intermittent late decelerations, cont to closely monitor    Discussed with Dr Kale Gasca DO 2017 3:32 PM

## 2017-11-01 NOTE — DISCHARGE SUMMARY
Discharge Summary -   Ghulam Taylor 33 y o  female MRN: [id number]  Unit/Bed#: -01 Encounter: [id number]    Admission Date: 10/31/2017     Discharge Date: 2017    Delivering Attending: Nguyen Barr MD   Discharging Attending: Dr Yuniel Monet    Principal Diagnosis:   Term pregnancy  Induced labor  Single fetus  Uncomplicated pregnancy  Celiac disease    Secondary Diagnosis:   Same as above, delivered    Procedures: Spontaneous vaginal delivery    Hospital Course:   Ghulam Taylor is a 33 y o  [place] at 38w3d who was initially admitted for elective induction of labor  She delivered a viable female  on 17 at [id number]  Weight 9lbs 1oz via normal spontaneous vaginal delivery  She sustained bilateral labial lacerations during delivery which was adequately repaired  Apgars were 9 (1 min) and 9 (5 min)   was transferred to  nursery  Patient tolerated the procedure well  Her post-delivery course was uncomplicated  Postpartum hemaglobin was 10 3  Her postpartum pain was well controlled with oral analgesics  On day of discharge, she was ambulating and able to reasonably perform all ADLs  She was voiding and had appropriate bowel function  Pain was well controlled  She was discharged home on postpartum day #1 without complications  Patient was instructed to follow up with her OB as an outpatient and was given appropriate warnings to call provider if she develops signs of infection or uncontrolled pain  Complications: None    Condition at discharge: good     Discharge Medications: For a complete list of the patient's medications, please refer to her medical reconcillation report  Discharge instructions/Information to patient and family:   See after visit summary for information provided to patient and family  Provisions for Follow-Up Care:  See after visit summary for information related to follow-up care and any pertinent home health orders        Disposition: See After Visit Summary for discharge disposition information      Planned Readmission: Li Villanueva DO  PGY-1 OB/GYN   11/2/2017 11:46 PM

## 2017-11-01 NOTE — ANESTHESIA PROCEDURE NOTES
CSE Block    Patient location during procedure: OB  Start time: 11/1/2017 8:58 AM  Reason for block: procedure for pain and at surgeon's request  Staffing  Anesthesiologist: Sherri Gosselin  Performed: anesthesiologist   Preanesthetic Checklist  Completed: patient identified, site marked, surgical consent, pre-op evaluation, timeout performed, IV checked, risks and benefits discussed and monitors and equipment checked  CSE  Patient position: sitting  Prep: ChloraPrep  Patient monitoring: cardiac monitor and continuous pulse ox  Approach: midline  Spinal Needle  Needle type: pencil-tip   Needle gauge: 27 G  Needle length: 10 cm  Epidural Needle  Injection technique: MILA saline  Needle type: Tuohy   Needle gauge: 18 G  Location: lumbar (1-5)  Catheter  Catheter type: side hole  Catheter size: 20 G  Catheter at skin depth: 10 cm  Test dose: negative  AssessmentInjection Assessment:  negative aspiration for heme, no paresthesia on injection, positive aspiration for clear CSF and no pain on injection

## 2017-11-01 NOTE — PLAN OF CARE
Knowledge Deficit     Verbalizes understanding of labor plan Completed        Labor & Delivery     Manages discomfort Completed          Labor & Delivery     Patient vital signs are stable Progressing        Potential for Falls     Patient will remain free of falls Progressing

## 2017-11-01 NOTE — H&P
H&P Exam - Obstetrics   Duke Johnson 33 y o  female MRN: [id number]  Unit/Bed#: -01 Encounter: [id number]    Patient is under the care of DOMENIC    History of Present Illness     Chief Complaint: Induction of labor "I am here to be induced"    HPI:  Duke Johnson is a 33 y o   female with an ALEX of 2017, Date entered prior to episode creation at 39w2d weeks gestation who is being admitted for Induction of labor  Her current obstetrical history is significant for suspected fetal macrosomia  Contractions: None  Leakage of fluid: None  Bleeding: None  Fetal movement: present  PREGNANCY COMPLICATIONS: GBS neg status, A+ blood type, celiac disease    OB History    Para Term  AB Living   1             SAB TAB Ectopic Multiple Live Births                  # Outcome Date GA Lbr Nilo/2nd Weight Sex Delivery Anes PTL Lv   1 Current                   Baby complications/comments: none    Review of Systems   Cardiovascular: Negative for chest pain, palpitations and leg swelling  Gastrointestinal: Negative for abdominal pain, blood in stool, constipation, diarrhea, nausea and vomiting  Historical Information   History reviewed  No pertinent past medical history  No past surgical history on file  Social History   History   Alcohol Use No     History   Drug Use No     History   Smoking Status    Never Smoker   Smokeless Tobacco    Not on file     Family History: non-contributory    Meds/Allergies      Prescriptions Prior to Admission   Medication    emtricitabine-tenofovir (TRUVADA) 200-300 mg per tablet    raltegravir (ISENTRESS) 400 mg tablet      No Known Allergies    OBJECTIVE:    Vitals: currently breastfeeding  There is no height or weight on file to calculate BMI  Physical Exam   Constitutional: She is oriented to person, place, and time  She appears well-developed and well-nourished  HENT:   Head: Normocephalic and atraumatic     Eyes: Pupils are equal, round, and reactive to light  Neck: Normal range of motion  Neck supple  Cardiovascular: Normal rate and regular rhythm  Exam reveals no gallop and no friction rub  No murmur heard  Pulmonary/Chest: Effort normal and breath sounds normal  No respiratory distress  She has no wheezes  She has no rales  Abdominal: Soft  Bowel sounds are normal  There is no tenderness  There is no rebound and no guarding  gravid   Genitourinary: Vagina normal and uterus normal    Neurological: She is alert and oriented to person, place, and time  Skin: Skin is warm and dry  Psychiatric: She has a normal mood and affect   Her behavior is normal  Judgment normal        Cervix: closed/thick/high       Fetal heart rate: 130 bpm baseline, moderate variability, positive accelerations, no decelerations    Bullard: q5-6min     EFW: 8lbs, 10/26 EFW >95%, AC >95% (GONZALO 17 0)    GBS: negative    Prenatal Labs:   Blood Type:   Lab Results   Component Value Date/Time    ABO Grouping A 07/31/2017 05:22 PM     , D (Rh type):   Lab Results   Component Value Date/Time    Rh Factor Positive 07/31/2017 05:22 PM     , Antibody Screen:   Lab Results   Component Value Date/Time    Antibody Screen Negative 07/31/2017 05:22 PM    , HCT/HGB:   Lab Results   Component Value Date/Time    Hematocrit 31 4 (L) 10/31/2017 09:26 PM    Hemoglobin 10 3 (L) 10/31/2017 09:26 PM      , MCV:   Lab Results   Component Value Date/Time    MCV 83 10/31/2017 09:26 PM      , Platelets:   Lab Results   Component Value Date/Time    Platelets 979 17/44/5171 09:26 PM      , 1 hour Glucola:   Lab Results   Component Value Date/Time    Glucose 103 10/09/2017 11:54 AM   , 3 hour GTT: No results found for: FLFEIKG9HP, Varicella: No results found for: VARICELLAIGG    , Rubella: No results found for: RUBELLAIGGQT     , VDRL/RPR:   Lab Results   Component Value Date/Time    RPR Non-Reactive 07/31/2017 11:36 AM      , Urine Culture/Screen:   Lab Results   Component Value Date/Time    Urine Culture No Growth <1000 cfu/mL 2017 11:36 AM       , Urine Drug Screen: No results found for: AMPHETUR, BARBTUR, BDZUR, THCUR, COCAINEUR, METHADONEUR, OPIATEUR, PCPUR, MTHAMUR, ECSTASYUR, TRICYCLICSUR, Hep B:   Lab Results   Component Value Date/Time    Hepatitis B Surface Ag Non-reactive 2017 09:54 PM     , Hep C: No components found for: HEPCSAG, EXTHEPCSAG   , HIV:   Lab Results   Component Value Date/Time    HIV-1/HIV-2 Ab Non-Reactive 2017 11:18 AM     , Chlamydia: No results found for: EXTCHLAMYDIA  , Gonorrhea:   Lab Results   Component Value Date/Time    N gonorrhoeae, DNA Probe N  gonorrhoeae Amplified DNA Negative 2017 01:37 PM     , Group B Strep:    Lab Results   Component Value Date/Time    Strep Grp B PCR Negative for Beta Hemolytic Strep Grp B by PCR 10/11/2017 12:48 PM          Invasive Devices     Peripheral Intravenous Line            Peripheral IV 10/31/17 Left Hand less than 1 day                  Assessment/Plan     ASSESSMENT:   Garry Carrillo is a 33 y o  yo   at 39w2d weeks gestation with suspected fetal macrosomia, to be induced with cytotec overnight  PLAN:   1) Admit to L&D for induction of labor 2/2 suspected fetal macrosomia    - IV LR for hydration   2) CBC, RPR, Blood Type    - Antepartum Hb pending   3) Analgesia and/or epidural at patient request   4) Anticipate    5) D/w Dr Jude Abbott      This patient will be an INPATIENT  and I certify the anticipated length of stay is >2 Midnights      Timur Musa DO   OB/GYN PGY-1  10/31/2017 9:40 PM

## 2017-11-01 NOTE — OB LABOR/OXYTOCIN SAFETY PROGRESS
Labor Progress Note - Azael Cameron 33 y o  female MRN: [id number]    Unit/Bed#: -01 Encounter: [id number]    Obstetric History       T0      L0     SAB0   TAB0   Ectopic0   Multiple0   Live Births0      Gestational Age: 39w3d     Contraction Frequency (minutes): 2-4  Contraction Quality: Strong  Tachysystole: No   Dilation: 2-3        Effacement (%): 90  Station: -1  Baseline Rate: 125 bpm  Fetal Heart Rate: 122 BPM  FHR Category: Category I          Notes/comments:     Patient is requesting epidural for her contraction pain  FHR tracing shows no signs of hypoxemia  Given the fact patient is being induced will give epidural now and then perform AROM after patient is comfortable to affect delivery      Aida Griffiths MD 2017 8:37 AM

## 2017-11-01 NOTE — ANESTHESIA PREPROCEDURE EVALUATION
Review of Systems/Medical History  Patient summary reviewed  Chart reviewed      Cardiovascular  Negative cardio ROS    Pulmonary  Negative pulmonary ROS ,        GI/Hepatic      Comment: Celiac disease     Negative  ROS        Endo/Other  Negative endo/other ROS      GYN  Currently pregnant , Prior pregnancy/OB history : 1 Parity: 0,     Comment: IOL for fetal macrosomia     Hematology  Anemia ,     Musculoskeletal  Negative musculoskeletal ROS        Neurology  Negative neurology ROS      Psychology   Negative psychology ROS            Physical Exam    Airway    Mallampati score: II  TM Distance: >3 FB  Neck ROM: full     Dental   No notable dental hx     Cardiovascular  Comment: Negative ROS, Rhythm: regular, Rate: normal, Cardiovascular exam normal    Pulmonary  Pulmonary exam normal Breath sounds clear to auscultation,     Other Findings        Anesthesia Plan  ASA Score- 2       Anesthesia Type- epidural and spinal with ASA Monitors  Additional Monitors:   Airway Plan:           Induction-       Informed Consent- Anesthetic plan and risks discussed with patient  Recent labs personally reviewed:  Lab Results   Component Value Date    WBC 9 31 10/31/2017    HGB 10 3 (L) 10/31/2017     10/31/2017     Lab Results   Component Value Date     2017    K 4 2 2017    BUN 11 2017    CREATININE 0 41 (L) 2017    GLUCOSE 79 2017     No results found for: PTT   No results found for: INR    Blood type A    No results found for: Ever Josh Connelly MD, have personally seen and evaluated the patient prior to anesthetic care  I have reviewed the pre-anesthetic record, and other medical records if appropriate to the anesthetic care  If a CRNA is involved in the case, I have reviewed the CRNA assessment, if present, and agree   Risks/benefits and alternatives discussed with patient including possible PONV, sore throat, and possibility of rare anesthetic and surgical emergencies

## 2017-11-01 NOTE — OB LABOR/OXYTOCIN SAFETY PROGRESS
Labor Progress Note - Kyra Cabello 33 y o  female MRN: [id number]    Unit/Bed#: -01 Encounter: [id number]    Obstetric History       T0      L0     SAB0   TAB0   Ectopic0   Multiple0   Live Births0      Gestational Age: 38w3d     Contraction Frequency (minutes): 1-2  Contraction Quality: Mild  Tachysystole: No   Dilation: 2        Effacement (%): 80  Station: -2  Baseline Rate: 128 bpm  Fetal Heart Rate: 128 BPM  FHR Category: Category I          Notes/comments:      Pt made good cervical change after first dose of cytotec  Contractions are too frequent for a second dose of cytotec at this time  Will allow patient to walk around unit/rest and re-evalaute in 1-2 hours      D/w Dr Anju Ferraro DO 2017 3:16 AM

## 2017-11-01 NOTE — L&D DELIVERY NOTE
Delivery Summary - OB/GYN   Laura Franco 33 y o  female MRN: [id number]  Unit/Bed#: -01 Encounter: [id number]    Pre-delivery Diagnosis:   1  39w3d pregnancy  2  Induced labor  3  Suspected macrosomia  4  Celiac disease    Post-delivery Diagnosis: same, delivered    Attending: Dr Gonzalo Villafuerte    Assistant(s): Dr Nemesio Prseton    Procedure:     Anesthesia: Epidural    Estimated Blood Loss:  300 mL    Specimens:   1  Arterial and venous cord gases  2  Cord blood  3  Segment of umbilical cord  4  Placenta to storage     Complications:  None apparent    Findings:  1  Viable female  delivered at 1617 weight pending;  Apgar scores of 9 at one minute and 9 at five minutes  2  Spontaneous delivery of placenta with centrally inserted 3-vessel cord  3  Thin meconium amniotic fluid  4  Right labial laceration, repaired with 4-0 Vicryl  5  Left labial laceration, hemostatic and well approximated       Disposition: Patient tolerated the procedure well and was recovering in labor and delivery room with family and  before being transferred to the post-partum floor  Procedure Details     Description of procedure  After pushing for 52 minutes, at 1617 patient delivered a viable female , weight pending, Apgars of 9 (1 min) and 9 (5 min)  The fetal vertex delivered spontaneously  There was no nuchal cord  The anterior shoulder delivered atraumatically with maternal expulsive forces and the assistance of downward traction  The posterior shoulder delivered with maternal expulsive forces and the assistance of upward traction  The remainder of the fetus delivered spontaneously  Upon delivery, the infant was placed on the mothers abdomen and the cord was clamped and cut  The infant was noted to cry spontaneously and was moving all extremities appropriately  There was no evidence for injury  Awaiting nurse resuscitators evaluated the  at bedside   Arterial and venous cord blood gases and cord blood was collected for analysis  These were promptly sent to the lab  In the immediate post-partum, 30 units of IV pitocin was administered and the uterus was noted to contract down well with massage and pitocin  The placenta delivered spontaneously at 1621 and was noted to have a centrally inserted 3 vessel cord  The vagina, cervix, and perineum were inspected and there was noted to be bilateral labial lacerations  Laceration Repair  Patient was comfortable with epidural at that time  Bilateral labial lacerations were identified and required repair  Laceration was repaired with 4-0 Vicryl in single interrupted sutures to reapproximate the laceration  Good hemostasis was confirmed at the conclusion of this procedure  At the conclusion of the delivery, all needle, sponge, and instrument counts were noted to be correct  Patient tolerated the procedure well and was allowed to recover in labor and delivery room with family and  before being transferred to the post-partum floor  Dr Ion Merchant was present and participated in all key portions of the case        Tamy Horton DO  OB/GYN, PGY2  2017, 4:53 PM

## 2017-11-01 NOTE — OB LABOR/OXYTOCIN SAFETY PROGRESS
Oxytocin Safety Progress Check Note - Rogerio Sarabia 33 y o  female MRN: [id number]    Unit/Bed#: -01 Encounter: [id number]    Obstetric History       T0      L0     SAB0   TAB0   Ectopic0   Multiple0   Live Births0      Gestational Age: 39w3d     Contraction Frequency (minutes): 2-4min  Contraction Quality: Mild  Tachysystole: No   Dilation: 2-3        Effacement (%): 80  Station: -1  Baseline Rate: 125 bpm  Fetal Heart Rate: 124 BPM  FHR Category: Category I          Notes/comments:      Pt uncomfortable, desiring pain medication  1mg Stadol will provide  Some gradual change noticed  Updated Dr Melvi Vick, still елена every 2-3min, will continue to monitor      Mario Nixon DO 2017 5:20 AM

## 2017-11-01 NOTE — OB LABOR/OXYTOCIN SAFETY PROGRESS
Oxytocin Safety Progress Check Note - Kris Saldivar 33 y o  female MRN: [id number]    Unit/Bed#: -01 Encounter: [id number]    Obstetric History       T0      L0     SAB0   TAB0   Ectopic0   Multiple0   Live Births0      Gestational Age: 39w3d     Contraction Frequency (minutes): 2-4  Contraction Quality: Moderate  Tachysystole: No   Dilation: 6        Effacement (%): 100  Station: 0  Baseline Rate: 135 bpm  Fetal Heart Rate: 129 BPM  FHR Category: Category II          Notes/comments:         Patient without cervical change  Recommend starting pitocin, patient in agreement  FHT currently reverted to cat 1  If no change at next check recommend IUPC        Roberto Mejía MD 2017 1:16 PM

## 2017-11-01 NOTE — PLAN OF CARE
Knowledge Deficit     Verbalizes understanding of labor plan Progressing        Labor & Delivery     Manages discomfort Progressing     Patient vital signs are stable Progressing        Potential for Falls     Patient will remain free of falls Progressing

## 2017-11-02 VITALS
HEART RATE: 63 BPM | TEMPERATURE: 98.2 F | WEIGHT: 157 LBS | OXYGEN SATURATION: 99 % | RESPIRATION RATE: 18 BRPM | BODY MASS INDEX: 26.8 KG/M2 | HEIGHT: 64 IN | SYSTOLIC BLOOD PRESSURE: 95 MMHG | DIASTOLIC BLOOD PRESSURE: 63 MMHG

## 2017-11-02 RX ADMIN — DOCUSATE SODIUM 100 MG: 100 CAPSULE, LIQUID FILLED ORAL at 08:11

## 2017-11-02 RX ADMIN — Medication 1 TABLET: at 08:11

## 2017-11-02 RX ADMIN — DOCUSATE SODIUM 100 MG: 100 CAPSULE, LIQUID FILLED ORAL at 18:23

## 2017-11-02 NOTE — PROGRESS NOTES
Progress Note - OB/GYN   Chel Putnam 33 y o  female MRN: [id number]  Unit/Bed#:  312-01 Encounter: [id number]    Assessment:  Post partum Day #1 s/p , stable    Plan:  Continue routine post partum care  Encourage ambulation  Encourage breastfeeding      Subjective/Objective   Chief Complaint:     Post delivery    Subjective:     Pain: yes, cramping, improved with meds  Tolerating PO: yes  Voiding: yes  Flatus: yes  BM: no  Ambulating: yes  Breastfeeding:  yes  Chest pain: no  Shortness of breath: no  Leg pain: no  Lochia: minimal    Objective:     Vitals: /62   Pulse 66   Temp 98 °F (36 7 °C) (Oral)   Resp 16   Ht 5' 4" (1 626 m)   Wt 71 2 kg (157 lb)   Breastfeeding?  Yes   BMI 26 95 kg/m²       Intake/Output Summary (Last 24 hours) at 17 0615  Last data filed at 17 2242   Gross per 24 hour   Intake             1080 ml   Output             1700 ml   Net             -620 ml       Lab Results   Component Value Date    WBC 9 31 10/31/2017    HGB 10 3 (L) 10/31/2017    HCT 31 4 (L) 10/31/2017    MCV 83 10/31/2017     10/31/2017       Physical Exam:     Gen: AAOx3, NAD  CV: RRR  Lungs: CTA b/l  Abd: Soft, non-tender, non-distended, no rebound or guarding  Uterine fundus firm and non-tender, bellow level of umbilicus   Ext: Non tender      Jersey Bran MD

## 2017-11-03 NOTE — PLAN OF CARE
DISCHARGE PLANNING     Discharge to home or other facility with appropriate resources Completed        INFECTION - ADULT     Absence or prevention of progression during hospitalization Completed     Absence of fever/infection during neutropenic period Completed        Knowledge Deficit     Patient/family/caregiver demonstrates understanding of disease process, treatment plan, medications, and discharge instructions Completed        PAIN - ADULT     Verbalizes/displays adequate comfort level or baseline comfort level Completed        Potential for Falls     Patient will remain free of falls Completed        SAFETY ADULT     Maintain or return to baseline ADL function Completed     Maintain or return mobility status to optimal level Completed

## 2017-11-03 NOTE — DISCHARGE INSTRUCTIONS
Vaginal Delivery   WHAT YOU SHOULD KNOW:   A vaginal delivery is the birth of your baby through your vagina (birth canal)  AFTER YOU LEAVE:   Medicines:  · NSAIDs  help decrease swelling and pain or fever  This medicine is available with or without a doctor's order  NSAIDs can cause stomach bleeding or kidney problems in certain people  If you take blood thinner medicine, always ask your healthcare provider if NSAIDs are safe for you  Always read the medicine label and follow directions  · Take your medicine as directed  Call your healthcare provider if you think your medicine is not helping or if you have side effects  Tell him if you are allergic to any medicine  Keep a list of the medicines, vitamins, and herbs you take  Include the amounts, and when and why you take them  Bring the list or the pill bottles to follow-up visits  Carry your medicine list with you in case of an emergency  Follow up with your primary healthcare provider:  Most women need to return 6 weeks after a vaginal delivery  Ask about how to care for your wounds or stitches  Write down your questions so you remember to ask them during your visits  Activity:  Rest as much as possible  Try to keep all activities short  You may be able to do some exercise soon after you have your baby  Talk with your primary healthcare provider before you start exercising  If you work outside the home, ask when you can return to your job  Kegel exercises:  Kegel exercises may help your vaginal and rectal muscles heal faster  You can do Kegel exercises by tightening and relaxing the muscles around your vagina  Kegel exercises help make the muscles stronger  Breast care:  When your milk comes in, your breasts may feel full and hard  Ask how to care for your breasts, even if you are not breastfeeding  Constipation:  Do not try to push the bowel movement out if it is too hard   High-fiber foods, extra liquids, and regular exercise can help you prevent constipation  Examples of high-fiber foods are fruit and bran  Prune juice and water are good liquids to drink  Regular exercise helps your digestive system work  You may also be told to take over-the-counter fiber and stool softener medicines  Take these items as directed  Hemorrhoids:  Pregnancy can cause severe hemorrhoids  You may have rectal pain because of the hemorrhoids  Ask how to prevent or treat hemorrhoids  Perineum care: Your perineum is the area between your vagina and anus  Keep the area clean and dry to help it heal and to prevent infection  Wash the area gently with soap and water when you bathe or shower  Rinse your perineum with warm water when you use the toilet  Your primary healthcare provider may suggest you use a warm sitz bath to help decrease pain  A sitz bath is a bathtub or basin filled to hip level  Stay in the sitz bath for 20 to 30 minutes, or as directed  Vaginal discharge: You will have vaginal discharge, called lochia, after your delivery  The lochia is bright red the first day or two after the birth  By the fourth day, the amount decreases, and it turns red-brown  Use a sanitary pad rather than a tampon to prevent a vaginal infection  It is normal to have lochia up to 8 weeks after your baby is born  Monthly periods: Your period may start again within 7 to 12 weeks after your baby is born  If you are breastfeeding, it may take longer for your period to start again  You can still get pregnant again even though you do not have your monthly period  Talk with your primary healthcare provider about a birth control method that will be good for you if you do not want to get pregnant  Mood changes: Many new mothers have some kind of mood changes after delivery  Some of these changes occur because of lack of sleep, hormone changes, and caring for a new baby  Some mood changes can be more serious, such as postpartum depression   Talk with your primary healthcare provider if you feel unable to care for yourself or your baby  Sexual activity:  You may need to avoid sex for 6 to 7 weeks after you have your baby  You may notice you have a decreased desire for sex, or sex may be painful  You may need to use a vaginal lubricant (gel) to help make sex more comfortable  Contact your primary healthcare provider if:   · You have heavy vaginal bleeding that fills 1 or more sanitary pads in 1 hour  · You have a fever  · Your pain does not go away, or gets worse  · The skin between your vagina and rectum is swollen, warm, or red  · You have swollen, hard, or painful breasts  · You feel very sad or depressed  · You feel more tired than usual      · You have questions or concerns about your condition or care  Seek care immediately or call 911 if:   · You have pus or yellow drainage coming from your vagina or wound  · You are urinating very little, or not at all  · Your arm or leg feels warm, tender, and painful  It may look swollen and red  · You feel lightheaded, have sudden and worsening chest pain, or trouble breathing  You may have more pain when you take deep breaths or cough, or you may cough up blood  © 2014 [address] is for End User's use only and may not be sold, redistributed or otherwise used for commercial purposes  All illustrations and images included in CareNotes® are the copyrighted property of [employer]  or Harjinder Sweeney  The above information is an  only  It is not intended as medical advice for individual conditions or treatments  Talk to your doctor, nurse or pharmacist before following any medical regimen to see if it is safe and effective for you

## 2017-11-09 LAB — PLACENTA IN STORAGE: NORMAL

## 2017-12-13 ENCOUNTER — ALLSCRIPTS OFFICE VISIT (OUTPATIENT)
Dept: OTHER | Facility: OTHER | Age: 27
End: 2017-12-13

## 2017-12-27 ENCOUNTER — GENERIC CONVERSION - ENCOUNTER (OUTPATIENT)
Dept: OTHER | Facility: OTHER | Age: 27
End: 2017-12-27

## 2018-01-10 NOTE — MISCELLANEOUS
Message  Pt notified and aware of Glucose results  Active Problems    1  Celiac disease (579 0) (K90 0)   2  Encounter for supervision of normal pregnancy in third trimester (V22 1) (Z34 93)   3  Exposure to blood-borne pathogen (V15 85) (Z77 21)   4  History of placenta previa (V13 29) (Z87 59)   5  Large for gestational age fetus affecting mother, antepartum, third trimester, single   gestation (18 58) (O42 63X0)   6  Pregnancy (V22 2) (Z34 90)    Current Meds   1  Multivitamins Oral Capsule Recorded    Allergies    1  No Known Drug Allergies    2  No Known Environmental Allergies   3   No Known Food Allergies    Signatures   Electronically signed by : Aidee England RN; Oct 10 2017  9:11AM EST                       (Author)

## 2018-01-11 NOTE — PROGRESS NOTES
OCT 9 2017         RE: Jd Taylor                                 To: [facility]  MR#: [id number]                                  [address]   : [address]                                   [address]   ENC: [id number]                             [place], [address]   (Exam #: [id number])                           Fax: [phone]      The LMP of this 33year old,  G1, P0-0-0-0 patient was 2017, her   working ALEX is 2017 and the current gestational age is 42 weeks 1   day by [address] [place] [address]  A sonographic examination was performed on OCT   9 2017 using real time equipment  The ultrasound examination was performed   using abdominal technique  The patient has a BMI of 27 1  Her blood   pressure today was 92/50  Earliest ultrasound found in her record: 17   8w1d  17 ALEX      Ivory Hoff has no complaints today  She reports regular fetal movements and   denies problems related to vaginal bleeding,  labor, or   hypertension  Screening for gestational diabetes on  revealed a   normal one-hour post Glucola value of 77 mg/dL        Cardiac motion was observed at 127 bpm       INDICATIONS      Interval growth assesment   Evaluate missed anatomy      Exam Types      Level I      RESULTS      Fetus # 1 of 1   Vertex presentation   Possible macrosomia   Placenta Location = Anterior, right lateral   No placenta previa   Placenta Grade = II      MEASUREMENTS (* Included In Average GA)      AC              36 2 cm        40 weeks 3 days* (>95%)   BPD              9 0 cm        36 weeks 4 days* (59%)   HC              32 0 cm        35 weeks 4 days* (33%)   Femur            7 4 cm        37 weeks 0 days* (76%)      Cerebellum       5 0 cm        36 weeks 4 days      HC/AC           0 89   FL/AC           0 20   FL/BPD          0 82   EFW (Ac/Fl/Hc)  3530 grams - 7 lbs 13 oz                 (94%)      THE AVERAGE GESTATIONAL AGE is 37 weeks 3 days +/- 21 days  AMNIOTIC FLUID      Q1: 6 9      Q2: 1 5      Q3: 4 4      Q4: 6 3   GONZALO Total = 19 0 cm   Amniotic Fluid: Normal      ANATOMY DETAILS      Visualized Appearing Sonographically Normal:   HEAD: (Calvarium, BPD Level, Cavum, Lateral Ventricles, Cerebellum);      FACE/NECK: (Profile, Nose/Lips);    TH  CAV : (Diaphragm); HEART: (Four   [employer], Proximal Right Outflow, Short Axis of Greater Vessels);      STOMACH, RIGHT KIDNEY, LEFT KIDNEY, BLADDER, PLACENTA      Not Visualized:   HEART: (Proximal Left Outflow, 3VV)      ANATOMY COMMENTS      The prior fetal anatomic survey was limited with respect to evaluation of   the facial profile  This anatomic view was seen today as sonographically   normal within the inherent limitations of fetal ultrasound  IMPRESSION      Serrato IUP   37 weeks and 3 days by this ultrasound  (ALEX=OCT 27 2017)   36 weeks and 1 day by 1st Tri Sono  (ALEX=NOV 5 2017)   Vertex presentation   Growth assessment indicated possible macrosomia   Regular fetal heart rate of 127 bpm   Anterior, right lateral placenta   No placenta previa      GENERAL COMMENT      No fetal structural abnormality is identified on the Level I survey today  The amniotic fluid volume is normal  The estimated fetal weight is placed   at the 94th percentile for this gestational age  Today's ultrasound findings and suggested follow-up were discussed in   detail with Vanessa Varela  We discussed that, most likely, the larger than   expected fetal size on today's study is secondary to normal genetic growth   variation, though we did discuss my recommendation for repeat laboratory   evaluation in order to rule out underlying gestational diabetes as an   etiology  I ordered a one-hour screening Glucola evaluation today which   returned with a normal value of 77 mg/dL  She will return to the [facility] at 39 weeks gestation for repeat ultrasound evaluation to assess   estimated fetal weight  Daily third trimester fetal kick counting was   discussed at the visit today  The face to face time, in addition to time spent discussing ultrasound   results, was approximately 10 minutes, greater than 50% of which was spent   during counseling and coordination of care  HARMAN Morgan M D     Maternal-Fetal Medicine   Electronically signed 10/10/17 08:04

## 2018-01-11 NOTE — PROGRESS NOTES
OCT 26 2017         RE: Ricardo Sweet                                 To: [facility]  MR#: [id number]                                  [address]   : [address]                                   [address]   ENC: [id number]:ESFGLENDA [place], [address]   (Exam #: [id number])                           Fax: [phone]      The LMP of this 33year old,  G1, P0-0-0-0 patient was 2017, her   working ALEX is 2017 and the current gestational age is 37 weeks 4   days by  [address] [place] [address]  A sonographic examination was performed on OCT   26 2017 using real time equipment  The ultrasound examination was   performed using abdominal technique  The patient has a BMI of 27 3  Her   blood pressure today was 95/57  Earliest ultrasound found in her record: 17   8w1d  17 ALEX      Cardiac motion was observed at 140 bpm       INDICATIONS      Interval growth assesment   macrosomia      Exam Types      Level I      RESULTS      Fetus # 1 of 1   Vertex presentation   Fetal growth appeared normal   Placenta Location = Anterior   No placenta previa   Placenta Grade = II      MEASUREMENTS (* Included In Average GA)      AC              38 5 cm        42 weeks 6 days* (>95%)   BPD              9 0 cm        36 weeks 3 days* (29%)   HC              32 8 cm        36 weeks 6 days* (23%)   Femur            7 6 cm        38 weeks 0 days* (58%)      HC/AC           0 85   FL/AC           0 20   FL/BPD          0 84   EFW (Ac/Fl/Hc)  4065 grams - 8 lbs 15 oz                 (>95%)      THE AVERAGE GESTATIONAL AGE is 38 weeks 4 days +/- 21 days  AMNIOTIC FLUID      Q1: 2 3      Q2: 6 5      Q3: 5 5      Q4: 2 7   GONZALO Total = 17 0 cm   Amniotic Fluid: Normal      ANATOMY COMMENTS      Fetal anatomy has been previously documented; no anomalies were   identified  No fetal structural abnormality is identified on the Level I   survey today   Follow up anatomy of the cerebellum, lateral ventricles, 4   chamber view, right outflow tract, left outflow tract, 3 vessel tracheal   view, diaphragm,  kidneys, stomach and bladder appear normal  Fetal   interval growth and amniotic fluid volume are normal   The fetus is   macrosomic and asymmetric with the abdomen greater than 95th percentile  The abdomen is measuring 5 7 cm larger than the fetal head  IMPRESSION      Serrato IUP   38 weeks and 4 days by this ultrasound  (ALEX=2017)   38 weeks and 4 days by 1st Tri Sono  (ALEX=2017)   Vertex presentation   Fetal growth appeared normal   Regular fetal heart rate of 140 bpm   Anterior placenta   No placenta previa      GENERAL COMMENT      I called Oma Mireles at home to review her ultrasound  A macrosomic asymmetric   fetus is noted on todays scan  Reviewed inaccuracy of EFW in the third   trimester which can be off by 1 lb either way  My concern is the asymmetry   and this plus a baby over 4000, 4250, 4500, 4750gm in a nondiabetic can   increase her risk for a fetal shoulder dystocia to around 5 2, 9 1, 14 3,   21 1% respectively  Most of these can be resolved by the different   maneuvers that her provider is trained to do  There are some cases though   that will be unable to be resolved without some danger to the fetus such   as hypoxemia, neurologic damage, fetal death or  nerve injury to   an arm ect  ACOG recommends a  section in a mother who has no   evidence of diabetes and her fetus has an estimated fetal weight of 5000   gms or more  Patient was given options for delivery:   1  Planned cautious induction anytime after 39 weeks with a ripe cervix  Would not recommend an operative vaginal delivery  2  Other option is elective CS after 39 weeks which is an option for all   patients      Oma Mireles reports she was a large baby that her mother had vaginally without   complications   Both she and her  would like to have a vaginal delivery  She reports she has an OB visit / pelvic exam with her providers   next week where they will discuss when to plan her induction  HARMAN Turner M D     Maternal-Fetal Medicine   Electronically signed 10/27/17 20:18

## 2018-01-12 VITALS
HEIGHT: 64 IN | BODY MASS INDEX: 23.63 KG/M2 | WEIGHT: 138.4 LBS | SYSTOLIC BLOOD PRESSURE: 95 MMHG | DIASTOLIC BLOOD PRESSURE: 52 MMHG

## 2018-01-12 VITALS
WEIGHT: 138 LBS | DIASTOLIC BLOOD PRESSURE: 82 MMHG | SYSTOLIC BLOOD PRESSURE: 124 MMHG | HEIGHT: 64 IN | BODY MASS INDEX: 23.56 KG/M2

## 2018-01-13 VITALS
WEIGHT: 151.04 LBS | DIASTOLIC BLOOD PRESSURE: 55 MMHG | BODY MASS INDEX: 25.79 KG/M2 | HEIGHT: 64 IN | SYSTOLIC BLOOD PRESSURE: 94 MMHG

## 2018-01-13 VITALS
SYSTOLIC BLOOD PRESSURE: 95 MMHG | WEIGHT: 159.01 LBS | DIASTOLIC BLOOD PRESSURE: 57 MMHG | HEIGHT: 64 IN | BODY MASS INDEX: 27.15 KG/M2

## 2018-01-13 VITALS
HEIGHT: 64 IN | BODY MASS INDEX: 26.98 KG/M2 | WEIGHT: 158 LBS | SYSTOLIC BLOOD PRESSURE: 92 MMHG | DIASTOLIC BLOOD PRESSURE: 50 MMHG

## 2018-01-13 NOTE — PROGRESS NOTES
AUG 14 2017         RE: Skip Barry                                 To: [facility]  MR#: [id number]                                  [address]   : [address]                                   [address]   ENC: [id number]:NASIR [place], [address]   (Exam #: [id number])                           Fax: [phone]      The LMP of this 33year old,  G1, P0-0-0-0 patient was 2017, her   working ALEX is 2017 and the current gestational age is 35 weeks 1   day by [address] [place] [address]  A sonographic examination was performed on AUG   14 2017 using real time equipment  The ultrasound examination was   performed using abdominal & vaginal techniques  The patient has a BMI of   26 1  Her blood pressure today was 94/55  Earliest ultrasound found in her record: 17   8w1d  17 ALEX      Ajay Morris has no complaints  She reports regular fetal movements and denies   problems related to vaginal bleeding,  labor, or hypertension  Screening for gestational diabetes on  revealed a normal one-hour   post Glucola value of 77 mg/dL        Cardiac motion was observed at 139 bpm       INDICATIONS      Interval growth assesment   placenta previa   Evaluate missed anatomy      Exam Types      Level I   Transvaginal      RESULTS      Fetus # 1 of 1   Vertex presentation   Fetal growth appeared normal   Placenta Location = Anterior   No placenta previa   Placenta Grade = I      MEASUREMENTS (* Included In Average GA)      AC              26 4 cm        30 weeks 4 days* (91%)   BPD              6 9 cm        27 weeks 5 days* (35%)   HC              27 0 cm        29 weeks 1 day * (59%)   Femur            5 7 cm        29 weeks 6 days* (70%)      Cerebellum       3 6 cm        31 weeks 1 day      HC/AC           1 02   FL/AC           0 21   FL/BPD          0 82   EFW (Ac/Fl/Hc)  1515 grams - 3 lbs 5 oz                 (81%)      THE AVERAGE GESTATIONAL AGE is 29 weeks 2 days +/- 18 days  AMNIOTIC FLUID      Q1: 4 0      Q2: 3 4      Q3: 2 3      Q4: 2 7   GONZALO Total = 12 4 cm   Amniotic Fluid: Normal      CERVICAL EVALUATION      The cervix appeared normal (Ultrasound Examination)  SUPINE      Cervical Length: 4 20 cm      OTHER TEST RESULTS           Funneling?: No             Dynamic Changes?: No              Debris?: No      ANATOMY DETAILS      Visualized Appearing Sonographically Normal:   HEAD: (Calvarium, BPD Level, Cavum, Lateral Ventricles, Choroid Plexus,   Cerebellum, Cisterna Magna);    FACE/NECK: (Palate);    TH  CAV :   (Diaphragm); HEART: (Four Chamber View, Proximal Left Outflow, Proximal   Right Outflow, Interventricular Septum, Cardiac Axis, Cardiac Position);      STOMACH, RIGHT KIDNEY, LEFT KIDNEY, BLADDER, SPINE: (Cervical Spine,   Thoracic Spine, Lumbar Spine, Sacrum); PLACENTA, PCI      Not Visualized:   FACE/NECK: (Profile)      ANATOMY COMMENTS      The prior fetal anatomic survey was limited with respect to evaluation of   the palate and facial profile  Views of the palate were seen today as   sonographically normal within the inherent limitations of fetal   ultrasound; however, views of the profile were still limited by the   constraints related to unfavorable fetal position  BIOPHYSICAL PROFILE      The Biophysical Profile score was 8/8  Breathin  Movement: 2  Tone: 2  AFV: 2      IMPRESSION      Serrato IUP   29 weeks and 2 days by this ultrasound  (ALEX=OCT 28 2017)   28 weeks and 1 day by 1st Tri Sono  (ALEX=2017)   Vertex presentation   Fetal growth appeared normal   Regular fetal heart rate of 139 bpm   Anterior placenta   No placenta previa      GENERAL COMMENT      No fetal structural abnormality is identified on the Level I survey today  Fetal interval growth and amniotic fluid volume are normal       The cervix is normal in appearance by transvaginal sonography    The   cervical length is normal   Cervical debris is not present  Cervical   funneling is not present  Neither placenta previa nor low-lying   placentation is present  There is no suspicion of vasa previa using color   Doppler evaluation  Today's ultrasound findings and suggested follow-up were discussed in   detail with Kayode Courser  [facility]  at 36 weeks   gestation to assess fetal interval growth and evaluate the facial profile  Her gestational diabetes screening results were discussed  Daily third   trimester fetal kick counting was discussed at the visit today  The face to face time, in addition to time spent discussing ultrasound   results, was approximately 10 minutes, greater than 50% of which was spent   during counseling and coordination of care  HARMAN Suazo S , HARMAN DAILEY S  DASH Huffman     Maternal-Fetal Medicine   Electronically signed 08/17/17 06:48

## 2018-01-13 NOTE — PROGRESS NOTES
Chief Complaint  Pt got her flu shot on left arm lot# [id number] EXP:18 UUL#[id number]   T-dap shot on her rt arm LOT#[id number] EXP:19 XOZ#[id number]      Active Problems    1  Celiac disease (579 0) (K90 0)   2  Encounter for fetal anatomic survey (V28 81) (Z36)   3  Encounter for screening for risk of pre-term labor (V28 82) (Z36)   4  Encounter for supervision of other normal pregnancy in second trimester (V22 1)   (Z34 82)   5  Exposure to blood-borne pathogen (V15 85) (Z77 21)   6  Need for diphtheria-tetanus-pertussis (Tdap) vaccine, adult/adolescent (V06 1) (Z23)   7  Needs flu shot (V04 81) (Z23)   8  Placenta previa without hemorrhage, antepartum, second trimester (641 03) (O44 02)   9  Pregnancy (V22 2) (Z34 90)    Current Meds   1  Multivitamins Oral Capsule Recorded    Allergies    1  No Known Drug Allergies    2  No Known Environmental Allergies   3  No Known Food Allergies    Vitals  Signs    Systolic: 871, LUE, Sitting  Diastolic: 60, LUE, Sitting  Height: 5 ft 4 in  Weight: 158 lb   BMI Calculated: 27 12  BSA Calculated: 1 77    Plan  Need for diphtheria-tetanus-pertussis (Tdap) vaccine, adult/adolescent    · Tdap (Adacel)  Needs flu shot    · Tdap (Adacel); INJECT 0 5  ML Intramuscular rt arm; To Be Done:  [id number]   · Fluzone Quadrivalent 0 5 ML Intramuscular Suspension Prefilled Syringe    Future Appointments    Date/Time Provider Specialty Site   10/10/2017 04:00 PM DASH Connolly  Obstetrics/Gynecology [facility]   10/03/2017 09:40 AM ANÍBAL Eaton Obstetrics/Gynecology [facility]   10/25/2017 09:20 AM Joe Burgess [facility] Ob[facility]etrics/Gynecology [facility]   10/09/2017 09:30 AM  [place], [address] [place] [address] Road   10/18/2017 11:00 AM DASH Robb   Obstetrics/Gynecology [facility]     Signatures   Electronically signed by : DASH Trevizo ; Sep 21 2017 12:06PM EST                       (Author)

## 2018-01-14 VITALS
TEMPERATURE: 96.5 F | HEIGHT: 64 IN | DIASTOLIC BLOOD PRESSURE: 54 MMHG | WEIGHT: 145 LBS | SYSTOLIC BLOOD PRESSURE: 96 MMHG | HEART RATE: 73 BPM | RESPIRATION RATE: 14 BRPM | BODY MASS INDEX: 24.75 KG/M2

## 2018-01-14 VITALS
DIASTOLIC BLOOD PRESSURE: 50 MMHG | WEIGHT: 143.6 LBS | BODY MASS INDEX: 24.52 KG/M2 | SYSTOLIC BLOOD PRESSURE: 84 MMHG | HEIGHT: 64 IN

## 2018-01-14 VITALS
DIASTOLIC BLOOD PRESSURE: 60 MMHG | HEIGHT: 64 IN | BODY MASS INDEX: 26.98 KG/M2 | SYSTOLIC BLOOD PRESSURE: 100 MMHG | WEIGHT: 158 LBS

## 2018-01-14 NOTE — CONSULTS
I had the pleasure of evaluating your patient, Yaakov Mcdaniels  My full evaluation follows:      Chief Complaint  Here for ultrasound study      History of Present Illness  Please refer to the ultrasound report for additional information  Active Problems    1  Celiac disease (579 0) (K90 0)   2  Encounter for supervision of normal pregnancy in third trimester (V22 1) (Z34 93)   3  Exposure to blood-borne pathogen (V15 85) (Z77 21)   4  History of placenta previa (V13 29) (Z87 59)   5  Pregnancy (V22 2) (Z34 90)    Past Medical History    · Exposure to blood-borne pathogen (V15 85) (Z77 21)   · 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)    Surgical History    · History of Right Breast Lumpectomy    Family History    · Family history of diabetes mellitus (V18 0) (Z83 3)    · Family history of malignant neoplasm of breast (V16 3) (Z80 3)    Social History    ·    · Never a smoker   · Non-smoker (V49 89) (Z78 9)    Current Meds   1  Multivitamins Oral Capsule Recorded    Allergies    1  No Known Drug Allergies    2  No Known Environmental Allergies   3  No Known Food Allergies    Vitals   Recorded: 10RNJ4015 48:83WZ   Systolic 92, RUE, Sitting   Diastolic 50, RUE, Sitting   Height 5 ft 4 in   Weight 158 lb    BMI Calculated 27 12   BSA Calculated 1 77   Pain Scale 0     Results/Data  (1) GLUCOSE, 1HR PG (50gm Glu Challenge Preg-Pts) 65UWD2427 11:54AM Jose Cavanaugh Order Number: [id number]     Test Name Result Flag Reference   GLUCOSE, 1 Hr  mg/dL  See Comment   Specimen collection should occur prior to Sulfasalazine administration due to the potential for falsely depressed results  Specimen collection should occur prior to Sulfapyridine administration due to the potential for falsely elevated results  Specimen collection should occur prior to Sulfasalazine administration due to the potential for falsely depressed results   Specimen collection should occur prior to Sulfapyridine administration due to the potential for falsely elevated results  Exam description: level I obstetrical ultrasound  Findings: Please refer to the ultrasound report for additional information  Plan  Large for gestational age fetus affecting mother, antepartum, third trimester, single  gestation    · (1) GLUCOSE, 1HR PG (50gm Glu Challenge Preg-Pts); Status:Resulted - Requires  Verification;   Done: 53LYG8601 11:54AM   Due:09Oct2018; Ordered; For:Large for gestational age fetus affecting mother, antepartum, third trimester, single gestation; Ordered By:Raquel Stringer;    Discussion/Summary    Please refer to the ultrasound report for additional information  The patient was counseled regarding diagnostic results, instructions for management, prognosis, impressions  Thank you very much for allowing me to participate in the care of this patient  If you have any questions, please do not hesitate to contact me        Future Appointments    Signatures   Electronically signed by : DASH Gomez ; Oct 10 2017  8:00AM EST                       (Author)

## 2018-01-14 NOTE — PROGRESS NOTES
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  The patient is apparently on antiretroviral therapy with a viral load of only 80 copies at the time of exposure  Immediately after the dysplastic exposure the patient irrigated out of her eye  She went to the ER and received her first dose of postexposure prophylaxis with Isentress and Truvada within 90 minutes  However she is 16 weeks pregnant and having some problems with emesis, and vomited her first dose  She received her next dose within 12 hours and is been able to take all the subsequent doses without any emesis  She is currently taking Zofran to prevent any vomiting  She denies having any current nausea or abdominal pain  She is not having any diarrhea  She denies any fever chills or sweats  Review of Systems  Complete-Female:   Constitutional: No fever, no chills, feels well, no tiredness, no recent weight gain or weight loss  Eyes: No complaints of eye pain, no red eyes, no eyesight problems, no discharge, no dry eyes, no itching of eyes  ENT: no complaints of earache, no loss of hearing, no nose bleeds, no nasal discharge, no sore throat, no hoarseness  Cardiovascular: No complaints of slow heart rate, no fast heart rate, no chest pain, no palpitations, no leg claudication, no lower extremity edema  Respiratory: No complaints of shortness of breath, no wheezing, no cough, no SOB on exertion, no orthopnea, no PND  Gastrointestinal: No complaints of abdominal pain, no constipation, no nausea or vomiting, no diarrhea, no bloody stools  Genitourinary: No complaints of dysuria, no incontinence, no pelvic pain, no dysmenorrhea, no vaginal discharge or bleeding  Active Problems    1  Amenorrhea (626 0) (N91 2)   2   Celiac disease (579 0) (K90 0)   3  Encounter for annual routine gynecological examination (V72 31) (Z01 419)   4  Encounter for prenatal care in first trimester of first pregnancy (V22 0) (Z34 01)   5  Establish gestational age, ultrasound (V28 3) (Z36)   10  Nausea/vomiting in pregnancy (643 90) (O21 9)    Past Medical History    1  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)    Surgical History    1  History of Right Breast Lumpectomy    Family History    1  Family history of diabetes mellitus (V18 0) (Z83 3)    2  Family history of malignant neoplasm of breast (V16 3) (Z80 3)    Social History    ·    · Never a smoker   · Non-smoker (V49 89) (Z78 9)    Current Meds   1  Isentress 400 MG Oral Tablet; TAKE 1 TABLET TWICE DAILY; Therapy: (Recorded:25May2017) to Recorded   2  Multivitamins Oral Capsule Recorded   3  Ondansetron HCl - 4 MG Oral Tablet; TAKE 1 TABLET EVERY 6 HOURS AS NEEDED; Therapy: 11FKU9640 to (Cici Contreras)  Requested for: 27Mar2017; Last   Rx:27Mar2017 Ordered   4  Truvada 200-300 MG Oral Tablet; Take 1 tablet daily; Therapy: (Recorded:25May2017) to Recorded    Allergies    1  No Known Drug Allergies    Vitals  Signs   Recorded: 28DAJ1486 03:43PM   Temperature: 96 5 F  Heart Rate: 73  Respiration: 14  Systolic: 96  Diastolic: 54  Height: 5 ft 4 in  Weight: 145 lb   BMI Calculated: 24 89  BSA Calculated: 1 71    Physical Exam    Constitutional   General appearance: No acute distress, well appearing and well nourished  Eyes   Conjunctiva and lids: No swelling, erythema or discharge  Pupils and irises: Equal, round and reactive to light  Ears, Nose, Mouth, and Throat   Oropharynx: Normal with no erythema, edema, exudate or lesions  Pulmonary   Respiratory effort: No increased work of breathing or signs of respiratory distress  Auscultation of lungs: Clear to auscultation  Cardiovascular   Auscultation of heart: Normal rate and rhythm, normal S1 and S2, without murmurs  Examination of extremities for edema and/or varicosities: Normal     Abdomen   Abdomen: Non-tender, no masses  Liver and spleen: No hepatomegaly or splenomegaly  Lymphatic   Palpation of lymph nodes in neck: No lymphadenopathy  Assessment    1  Exposure to blood-borne pathogen (V15 85) (Z77 21)   2  Nausea/vomiting in pregnancy (643 90) (O21 9)   3  Pregnancy (V22 2) (Z33 1)    Discussion/Summary  Discussion Summary:   HIV exposure-extremely low risk as the patient had a splash injury related with diluted blood into the conjunctiva  I explained to the patient and her  that the risk of transmission is probably on the order of one in 5000 if she took no antiretroviral therapy  By taking the postexposure prophylaxis her risk dramatically drops  Further, the source patient only had a viral load of 80 copies which further decrease the risk of transmission  Despite the low risk the patient wishes to continue the postexposure prophylaxis  I explained that the risk to the baby is extremely small as this combination has been used in pregnancy safely for those that have HIV  -Continue Isentress and Truvada to complete a one-month course   -Recheck CBC with differential and CMP at 2 weeks and 4 weeks   -Recheck HIV screen at 6 weeks, 3 months, and 6 months   -Follow-up with employee health   -Follow up with me as needed    Pregnancy-some nausea and vomiting associated with the pregnancy  Patient able to take her ART without difficulty as long as she taking Zofran  Follow-up with OB/GYN  Counseling Documentation With Imm: The patient, patient's family was counseled regarding diagnostic results, instructions for management, prognosis, risks and benefits of treatment options  Patient's Capacity to Self-Care: Patient is able to Self-Care        Future Appointments    Date/Time Provider Specialty Site   2017 09:00 AM  Dafne Payne OUTPATIENT   2017 02:20 PM DASH Goldberg   Obstetrics/Gynecology [facility] OB     Signatures   Electronically signed by : Elizabeth Doyle MD; May 25 2017  4:02PM EST                       (Author)

## 2018-01-17 NOTE — PROGRESS NOTES
MAY 2 2017         RE: Ludwin Humphrey                                 To: [facility]  MR#: [id number]                                  [address]   : [address]                                   [address]   ENC: [id number]                             [place], [address]   (Exam #: [id number])                           Fax: [phone]      The LMP of this 23year old,  G1, P0-0-0-0 patient was 2017, her   working ALEX is 2017 and the current gestational age is 17 weeks 2   days by 1st Trimester Sono  A sonographic examination was performed on MAY   2 2017 using real time equipment  The ultrasound examination was performed   using abdominal technique  The patient has a BMI of 23 7  Her blood   pressure today was 95/52  Earliest ultrasound found in her record: 17   8w1d  17 ALEX      Thank you very much for your kind referral of Ludwin Humphrey to the   [facility]  in [place] on May 2, 2017 for trimester ultrasound   evaluation and MFM assessment  Terrie Carter is a 21-year-old primigravida who is   currently at 13-2/7 weeks gestation by menstrual dating and an estimated   due date of 2017  With the exception of occasional nausea and   vomiting, treated with Zofran, her prenatal course has been unremarkable   so far  Terrie Carter has no complaints  She denies vaginal bleeding  She has a   history of celiac disease  Her past medical and surgical histories are   otherwise unremarkable  Her daily medications otherwise include a prenatal   vitamin and vitamin B6  She has no known drug allergy  She denies tobacco,   alcohol, or illicit drug use during the pregnancy  She has a brother with   type 1 diabetes  The family medical history is otherwise negative with   respect to first degree relatives with diabetes, hypertension, or venous   thromboembolism           Multiple longitudinal and transverse sections revealed a rodriguez   intrauterine pregnancy with the fetus in variable presentation  The   placenta is anterior in implantation  Cardiac motion was observed at 130 bpm       INDICATIONS      confirm gestational age      Exam Types      Level I      RESULTS      Fetus # 1 of 1   Variable presentation   Fetal growth appeared normal      MEASUREMENTS (* Included In Average GA)      CRL              8 1 cm        13 weeks 5 days*   Nuchal Trans    1 90 mm      THE AVERAGE GESTATIONAL AGE is 13 weeks 5 days +/- 7 days  ANATOMY COMMENTS      Anatomic detail is limited at this gestational age  The yolk sac was not   noted  The fetal cranium appeared normal in shape and the nuchal   translucency was normal in size at 1 9 mm  The nasal bone appears to be   present  The intracranial anatomy was unremarkable  Evaluation of the   spine is suboptimal secondary to unfavorable fetal position  Anatomy of   the fetal thorax appeared within normal limits  The cardiac rhythm was   regular  The four-chamber view appears normal   Within the abdomen,   stomach & bladder were visualized and the abdominal wall appeared intact  A three vessel cord appears to be present  Active movement of the fetal   body & extremities was seen  There is no suspicion of a subchorionic   bleed  The placental cord insertion was normal    There is no suspicion   of a uterine myoma  Free fluid is not seen in the posterior cul-de-sac  ADNEXA      The left ovary appeared normal and measured 2 5 x 1 6 x 1 5 cm with a   volume of 3 1 cc   The right ovary appeared normal and measured 2 3 x 1 9 x   1 9 cm with a volume of 4 3 cc       AMNIOTIC FLUID      Amniotic Fluid: Normal      IMPRESSION      Serrato IUP   13 weeks and 5 days by this ultrasound  (ALEX=NOV 2 2017)   13 weeks and 2 days by [facility] Tri Sono  (ALEX=NOV 5 2017)   Variable presentation   Fetal growth appeared normal   Regular fetal heart rate of 130 bpm   Anterior placenta      GENERAL COMMENT      Today's ultrasound findings and suggested follow-up were discussed in   detail with Concepcion Hardy  We discussed the Sequential Screen in detail, including   the sensitivity for identification of Down syndrome  We discussed that   definitive prenatal diagnosis is possible only through genetic   amniocentesis or chorionic villus sampling  At this time, Concepcion Hardy does not   wish to pursue genetic screening  She will return to the [facility]    at 20 weeks gestation for level II ultrasound evaluation  The face to face time, in addition to time spent discussing ultrasound   results, was approximately  minutes, greater than 50% of which was spent   during counseling and coordination of care  HARMAN Falk M D     Maternal-Fetal Medicine   Electronically signed 05/02/17 17:23

## 2018-01-17 NOTE — MISCELLANEOUS
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  Two weeks prior she had her Tdap vaccine  She also had a Tdap in November 2016  Employee health wants to treat her with Z-pack  She wanted to know if it was necessary  I checked with Dr Lady Rocha, he did not feel it was necessary as long as she is not symptomatic  Patient was notified  Active Problems    1  Celiac disease (579 0) (K90 0)   2  Encounter for supervision of normal pregnancy in third trimester (V22 1) (Z34 93)   3  Exposure to blood-borne pathogen (V15 85) (Z77 21)   4  History of placenta previa (V13 29) (Z87 59)   5  Large for gestational age fetus affecting mother, antepartum, third trimester, single   gestation (18 58) (O42 63X0)   6  Pregnancy (V22 2) (Z34 90)    Current Meds   1  Multivitamins Oral Capsule Recorded    Allergies    1  No Known Drug Allergies    2  No Known Environmental Allergies   3   No Known Food Allergies    Signatures   Electronically signed by : Edna Haile, ; Oct 16 2017  1:03PM EST                       (Author)

## 2018-01-17 NOTE — RESULT NOTES
Verified Results  (1) GLUCOSE, 1HR PG (50gm Glu Challenge Preg-Pts) 26NIF1553 11:54AM [facility] Order Number: [id number]     Test Name Result Flag Reference   GLUCOSE, 1 Hr  mg/dL  See Comment   Specimen collection should occur prior to Sulfasalazine administration due to the potential for falsely depressed results  Specimen collection should occur prior to Sulfapyridine administration due to the potential for falsely elevated results  Specimen collection should occur prior to Sulfasalazine administration due to the potential for falsely depressed results  Specimen collection should occur prior to Sulfapyridine administration due to the potential for falsely elevated results

## 2018-01-18 NOTE — PROGRESS NOTES
2017         RE: Dulce Duran                                 To: [facility]  MR#: [id number]                                  [address]   : [address]                                   [address]   ENC: [id number]:[facility]                             [place], [address]   (Exam #: [id number])                           Fax: [phone]      The LMP of this 23year old,  G1, P0-0-0-0 patient was 2017, her   working ALEX is 2017 and the current gestational age is 25 weeks 1   day by [address] [place] [address]  A sonographic examination was performed on 2017 using real time equipment  The ultrasound examination was   performed using abdominal & vaginal techniques  The patient has a BMI of   24 5  Her blood pressure today was 80/50  Earliest ultrasound found in her record: 17   8w1d  17 ALEX      Patrice Wade has no complaints today  She reports fetal movement and denies   vaginal bleeding  She declined genetic screening earlier in the pregnancy  Her prenatal course has been unremarkable in the interim since her initial   MFM evaluation        Cardiac motion was observed at 138 bpm       INDICATIONS      fetal anatomical survey      Exam Types      LEVEL II   Transvaginal      RESULTS      Fetus # 1 of 1   Transverse presentation   Fetal growth appeared normal   Placenta Location = Anterior   Complete placenta previa   Placenta Grade = I      MEASUREMENTS (* Included In Average GA)      AC              16 2 cm        21 weeks 0 days* (70%)   BPD              4 5 cm        19 weeks 5 days* (44%)   HC              17 0 cm        19 weeks 4 days* (34%)   Femur            3 4 cm        20 weeks 6 days* (58%)      Nuchal Fold      5 0 mm   NBL              8 0 mm      Humerus          3 4 cm        21 weeks 3 days  (82%)      Cerebellum       2 2 cm        21 weeks 4 days   Biorbit          3 1 cm        19 weeks 5 days   CisternaMagna    3 5 mm      HC/AC 1 05   FL/AC           0 21   FL/BPD          0 75   EFW (Ac/Fl/Hc)   375 grams - 0 lbs 13 oz      THE AVERAGE GESTATIONAL AGE is 20 weeks 2 days +/- 10 days  AMNIOTIC FLUID         Largest Vertical Pocket = 5 6 cm   Amniotic Fluid: Normal      CERVICAL EVALUATION      The cervix appeared normal (Ultrasound Examination)  SUPINE      Cervical Length: 4 90 cm      OTHER TEST RESULTS           Funneling?: No             Dynamic Changes?: No        Resp  To TFP?: No                      Debris?: No      ANATOMY      Head                                    Normal   Face/Neck                               See Details   Th  Cav  Normal   Heart                                   Normal   Abd  Cav  Normal   Stomach                                 Normal   Right Kidney                            Normal   Left Kidney                             Normal   Bladder                                 Normal   Abd  Wall                               Normal   Spine                                   Normal   Extrems                                 Normal   Genitalia                               Normal   Placenta                                Normal   Umbl  Cord                              Normal   Uterus                                  Normal   PCI                                     Normal      ANATOMY DETAILS      Visualized Appearing Sonographically Normal:   HEAD: (Calvarium, BPD Level, Cavum, Lateral Ventricles, Choroid Plexus,   Cerebellum, Cisterna Magna);    FACE/NECK: (Neck, Nuchal Fold, Orbits,   Nose/Lips, Face);    TH  CAV  : (Lungs, Diaphragm);     HEART: (Four Chamber   View, Proximal Left Outflow, Proximal Right Outflow, 3VV, 3 Vessel   Trachea, Short Axis of Greater Vessels, Ductal Arch, Aortic Arch,   Interventricular Septum, Interatrial Septum, IVC, SVC, Cardiac Axis,   Cardiac Position);    ABD  CAV : (Liver);    STOMACH, RIGHT KIDNEY, LEFT KIDNEY, BLADDER, ABD  WALL, SPINE: (Cervical Spine, Thoracic Spine, Lumbar   Spine, Sacrum);    EXTREMS: (Lt Humerus, Rt Humerus, Lt Forearm, Rt   Forearm, Lt Hand, Rt Hand, Lt Femur, Rt Femur, Lt Low Leg, Rt Low Leg, Lt   Foot, Rt Foot);    GENITALIA, PLACENTA, UMBL  CORD, UTERUS, PCI      Suboptimally Visualized:   FACE/NECK: (Palate)      Not Visualized:   FACE/NECK: (Profile)      ADNEXA      The left ovary appeared normal and measured 2 6 x 2 1 x 1 5 cm with a   volume of 4 3 cc  The right ovary was not visualized  IMPRESSION      Serrato IUP   20 weeks and 2 days by this ultrasound  (ALEX=NOV 4 2017)   20 weeks and 1 day by 1st Tri Sono  (ALEX=NOV 5 2017)   Transverse presentation   Fetal growth appeared normal   Regular fetal heart rate of 138 bpm   Anterior placenta   Complete placenta previa      GENERAL COMMENT      No fetal structural abnormality or ultrasound marker for aneuploidy is   identified on the Level II ultrasound study today  The palate and facial   profile are suboptimally imaged secondary to unfavorable fetal position  Fetal growth and amniotic fluid volume are normal  Complete anterior   placenta previa is present  The placenta is otherwise normal in   appearance  The cervix is normal in appearance by transvaginal sonography  The   cervical length is normal   Cervical debris is not present  Cervical   funneling is not present  Neither provocative nor dynamic change is   appreciated  Today's ultrasound findings and suggested follow-up were discussed in   detail with Frances Grimm  We discussed that prenatal ultrasound cannot rule out   all congenital abnormalities  Frances Grimm and I discussed an increased risk for   vaginal bleeding in association with placenta previa  Pelvic rest was   recommended  She will return to the [facility]  in the early third   trimester to assess interval growth and anatomic targets not imaged well   today, and evaluate placental location        The face to face time, in addition to time spent discussing ultrasound   results, was approximately 10 minutes, greater than 50% of which was spent   during counseling and coordination of care  HARMAN López M D     Maternal-Fetal Medicine   Electronically signed 06/19/17 10:06

## 2018-01-22 VITALS — SYSTOLIC BLOOD PRESSURE: 100 MMHG | WEIGHT: 159 LBS | BODY MASS INDEX: 27.29 KG/M2 | DIASTOLIC BLOOD PRESSURE: 60 MMHG

## 2018-01-22 VITALS
DIASTOLIC BLOOD PRESSURE: 62 MMHG | BODY MASS INDEX: 24.41 KG/M2 | SYSTOLIC BLOOD PRESSURE: 112 MMHG | HEIGHT: 64 IN | WEIGHT: 143 LBS

## 2018-01-22 VITALS
BODY MASS INDEX: 26.8 KG/M2 | WEIGHT: 157 LBS | HEIGHT: 64 IN | DIASTOLIC BLOOD PRESSURE: 62 MMHG | SYSTOLIC BLOOD PRESSURE: 118 MMHG

## 2018-01-22 VITALS
WEIGHT: 162 LBS | HEIGHT: 64 IN | BODY MASS INDEX: 27.66 KG/M2 | DIASTOLIC BLOOD PRESSURE: 56 MMHG | SYSTOLIC BLOOD PRESSURE: 100 MMHG

## 2018-01-22 VITALS
HEIGHT: 64 IN | BODY MASS INDEX: 25.44 KG/M2 | SYSTOLIC BLOOD PRESSURE: 102 MMHG | WEIGHT: 149 LBS | DIASTOLIC BLOOD PRESSURE: 58 MMHG

## 2018-01-22 VITALS
BODY MASS INDEX: 23.9 KG/M2 | SYSTOLIC BLOOD PRESSURE: 100 MMHG | DIASTOLIC BLOOD PRESSURE: 70 MMHG | HEIGHT: 64 IN | WEIGHT: 140 LBS

## 2018-01-22 VITALS
DIASTOLIC BLOOD PRESSURE: 64 MMHG | HEIGHT: 64 IN | BODY MASS INDEX: 27.03 KG/M2 | SYSTOLIC BLOOD PRESSURE: 112 MMHG | WEIGHT: 158.31 LBS

## 2018-01-22 VITALS
WEIGHT: 158 LBS | BODY MASS INDEX: 26.98 KG/M2 | HEIGHT: 64 IN | DIASTOLIC BLOOD PRESSURE: 50 MMHG | SYSTOLIC BLOOD PRESSURE: 106 MMHG

## 2018-01-23 VITALS
DIASTOLIC BLOOD PRESSURE: 60 MMHG | WEIGHT: 133.5 LBS | SYSTOLIC BLOOD PRESSURE: 90 MMHG | BODY MASS INDEX: 22.79 KG/M2 | HEIGHT: 64 IN

## 2018-01-23 NOTE — MISCELLANEOUS
Message  Return to work or school:   Ana Win is under my professional care   She was seen in my office on 12-13-17   She is able to return to work on  or after 12/13/17            Signatures   Electronically signed by : DASH Fisher ; Dec 13 2017 10:09AM EST                       (Author)

## 2018-01-24 VITALS
DIASTOLIC BLOOD PRESSURE: 60 MMHG | HEIGHT: 64 IN | WEIGHT: 133.38 LBS | SYSTOLIC BLOOD PRESSURE: 90 MMHG | BODY MASS INDEX: 22.77 KG/M2

## 2018-01-30 ENCOUNTER — OFFICE VISIT (OUTPATIENT)
Dept: OBGYN CLINIC | Facility: CLINIC | Age: 28
End: 2018-01-30
Payer: COMMERCIAL

## 2018-01-30 VITALS — WEIGHT: 132 LBS | BODY MASS INDEX: 22.66 KG/M2 | DIASTOLIC BLOOD PRESSURE: 56 MMHG | SYSTOLIC BLOOD PRESSURE: 100 MMHG

## 2018-01-30 DIAGNOSIS — Z30.431 IUD CHECK UP: Primary | ICD-10-CM

## 2018-01-30 PROBLEM — O36.63X0 LARGE FOR GESTATIONAL AGE FETUS AFFECTING MOTHER, ANTEPARTUM, THIRD TRIMESTER, SINGLE GESTATION: Status: RESOLVED | Noted: 2017-11-01 | Resolved: 2018-01-30

## 2018-01-30 PROBLEM — Z3A.39 39 WEEKS GESTATION OF PREGNANCY: Status: RESOLVED | Noted: 2017-11-01 | Resolved: 2018-01-30

## 2018-01-30 PROCEDURE — 99212 OFFICE O/P EST SF 10 MIN: CPT | Performed by: OBSTETRICS & GYNECOLOGY

## 2018-01-30 RX ORDER — COPPER 313.4 MG/1
INTRAUTERINE DEVICE INTRAUTERINE CONTINUOUS
Status: DISCONTINUED | OUTPATIENT
Start: 2018-01-30 | End: 2018-01-30

## 2018-01-30 NOTE — PROGRESS NOTES
Assessment/Plan:      Diagnoses and all orders for this visit:    IUD check up     Normal exam today  Will return in 3 months for annual exam    Call with questions or concerns  Subjective:     Patient ID: Ny Rodriguez is a 33 y o  female  Patient here for Paraguard string check  She is doing well - no concerns  Having mild spotting today, is breastfeeding, unsure if will be start of menses  No pelvic pain or cramping  Review of Systems   Genitourinary: Negative for dyspareunia, menstrual problem, pelvic pain and vaginal bleeding  Past Medical History:   Diagnosis Date    Anemia     Celiac disease 2007    Fibroadenoma of breast, right     resolved 2012    Visual impairment      Social History     Social History    Marital status: /Civil Union     Spouse name: N/A    Number of children: N/A    Years of education: N/A     Occupational History    Not on file  Social History Main Topics    Smoking status: Never Smoker    Smokeless tobacco: Never Used    Alcohol use No    Drug use: No    Sexual activity: Yes     Partners: Male     Birth control/ protection: IUD     Other Topics Concern    Not on file     Social History Narrative              Objective:     Physical Exam   Genitourinary: Vagina normal    Genitourinary Comments: Cervix:  IUD strings present, not trimmed  Small amount old blood in vagina   Vitals reviewed

## 2018-03-07 NOTE — PROGRESS NOTES
[employer] Education 1st Trimester:   First Trimester Education provided:   benefits of breastfeeding, importance of exclusive breastfeeding, early initiation of breastfeeding, exclusive breastfeeding for the first 6 months and Other education given: Sebastien Ramirez The patient is planning on breastfeeding     Prenatal education provided by: Park Riggs      Signatures   Electronically signed by : DASH Vizcaino ; Apr 10 2017  1:13PM EST

## 2018-04-13 ENCOUNTER — APPOINTMENT (OUTPATIENT)
Dept: LAB | Age: 28
End: 2018-04-13
Payer: COMMERCIAL

## 2018-04-13 ENCOUNTER — OFFICE VISIT (OUTPATIENT)
Dept: INTERNAL MEDICINE CLINIC | Facility: CLINIC | Age: 28
End: 2018-04-13
Payer: COMMERCIAL

## 2018-04-13 VITALS
HEIGHT: 64 IN | HEART RATE: 64 BPM | WEIGHT: 128.4 LBS | SYSTOLIC BLOOD PRESSURE: 108 MMHG | DIASTOLIC BLOOD PRESSURE: 72 MMHG | TEMPERATURE: 98.1 F | BODY MASS INDEX: 21.92 KG/M2

## 2018-04-13 DIAGNOSIS — R10.13 EPIGASTRIC ABDOMINAL PAIN: ICD-10-CM

## 2018-04-13 DIAGNOSIS — R11.0 NAUSEA: ICD-10-CM

## 2018-04-13 DIAGNOSIS — K59.00 CONSTIPATION, UNSPECIFIED CONSTIPATION TYPE: ICD-10-CM

## 2018-04-13 DIAGNOSIS — R11.0 NAUSEA: Primary | ICD-10-CM

## 2018-04-13 DIAGNOSIS — K90.0 CELIAC DISEASE: ICD-10-CM

## 2018-04-13 LAB
25(OH)D3 SERPL-MCNC: 27.2 NG/ML (ref 30–100)
ALBUMIN SERPL BCP-MCNC: 3.7 G/DL (ref 3.5–5)
ALP SERPL-CCNC: 59 U/L (ref 46–116)
ALT SERPL W P-5'-P-CCNC: 27 U/L (ref 12–78)
ANION GAP SERPL CALCULATED.3IONS-SCNC: 5 MMOL/L (ref 4–13)
AST SERPL W P-5'-P-CCNC: 31 U/L (ref 5–45)
B-HCG SERPL-ACNC: <2 MIU/ML
BASOPHILS # BLD AUTO: 0.02 THOUSANDS/ΜL (ref 0–0.1)
BASOPHILS NFR BLD AUTO: 1 % (ref 0–1)
BILIRUB SERPL-MCNC: 0.48 MG/DL (ref 0.2–1)
BUN SERPL-MCNC: 10 MG/DL (ref 5–25)
CALCIUM SERPL-MCNC: 8.4 MG/DL
CHLORIDE SERPL-SCNC: 110 MMOL/L (ref 100–108)
CO2 SERPL-SCNC: 26 MMOL/L (ref 21–32)
CREAT SERPL-MCNC: 0.61 MG/DL (ref 0.6–1.3)
EOSINOPHIL # BLD AUTO: 0.02 THOUSAND/ΜL (ref 0–0.61)
EOSINOPHIL NFR BLD AUTO: 1 % (ref 0–6)
ERYTHROCYTE [DISTWIDTH] IN BLOOD BY AUTOMATED COUNT: 13.6 % (ref 11.6–15.1)
FERRITIN SERPL-MCNC: 26 NG/ML (ref 8–388)
GFR SERPL CREATININE-BSD FRML MDRD: 126 ML/MIN/1.73SQ M
GLUCOSE P FAST SERPL-MCNC: 73 MG/DL (ref 65–99)
HCT VFR BLD AUTO: 40.7 % (ref 34.8–46.1)
HGB BLD-MCNC: 12.9 G/DL (ref 11.5–15.4)
LYMPHOCYTES # BLD AUTO: 1.24 THOUSANDS/ΜL (ref 0.6–4.47)
LYMPHOCYTES NFR BLD AUTO: 33 % (ref 14–44)
MCH RBC QN AUTO: 27.5 PG (ref 26.8–34.3)
MCHC RBC AUTO-ENTMCNC: 31.7 G/DL (ref 31.4–37.4)
MCV RBC AUTO: 87 FL (ref 82–98)
MONOCYTES # BLD AUTO: 0.46 THOUSAND/ΜL (ref 0.17–1.22)
MONOCYTES NFR BLD AUTO: 12 % (ref 4–12)
NEUTROPHILS # BLD AUTO: 2.07 THOUSANDS/ΜL (ref 1.85–7.62)
NEUTS SEG NFR BLD AUTO: 53 % (ref 43–75)
NRBC BLD AUTO-RTO: 0 /100 WBCS
PLATELET # BLD AUTO: 227 THOUSANDS/UL (ref 149–390)
PMV BLD AUTO: 11 FL (ref 8.9–12.7)
POTASSIUM SERPL-SCNC: 3.6 MMOL/L (ref 3.5–5.3)
PROT SERPL-MCNC: 7.3 G/DL (ref 6.4–8.2)
RBC # BLD AUTO: 4.69 MILLION/UL (ref 3.81–5.12)
SODIUM SERPL-SCNC: 141 MMOL/L (ref 136–145)
TSH SERPL DL<=0.05 MIU/L-ACNC: 1.32 UIU/ML (ref 0.36–3.74)
WBC # BLD AUTO: 3.81 THOUSAND/UL (ref 4.31–10.16)

## 2018-04-13 PROCEDURE — 3008F BODY MASS INDEX DOCD: CPT | Performed by: NURSE PRACTITIONER

## 2018-04-13 PROCEDURE — 82306 VITAMIN D 25 HYDROXY: CPT

## 2018-04-13 PROCEDURE — 84702 CHORIONIC GONADOTROPIN TEST: CPT

## 2018-04-13 PROCEDURE — 36415 COLL VENOUS BLD VENIPUNCTURE: CPT

## 2018-04-13 PROCEDURE — 99204 OFFICE O/P NEW MOD 45 MIN: CPT | Performed by: NURSE PRACTITIONER

## 2018-04-13 PROCEDURE — 82728 ASSAY OF FERRITIN: CPT

## 2018-04-13 PROCEDURE — 85025 COMPLETE CBC W/AUTO DIFF WBC: CPT

## 2018-04-13 PROCEDURE — 80053 COMPREHEN METABOLIC PANEL: CPT

## 2018-04-13 PROCEDURE — 84443 ASSAY THYROID STIM HORMONE: CPT

## 2018-04-13 NOTE — PROGRESS NOTES
Assessment/Plan:       Diagnoses and all orders for this visit:    Nausea  -     CBC and differential; Future  -     Comprehensive metabolic panel; Future  -     Ferritin; Future  -     Vitamin D 25 hydroxy; Future  -     TSH, 3rd generation with T4 reflex; Future  -     Ambulatory referral to Gastroenterology; Future  -     hCG, quantitative; Future    Constipation, unspecified constipation type  -     CBC and differential; Future  -     Comprehensive metabolic panel; Future  -     Ferritin; Future  -     Vitamin D 25 hydroxy; Future  -     TSH, 3rd generation with T4 reflex; Future    Epigastric abdominal pain  -     CBC and differential; Future  -     Comprehensive metabolic panel; Future  -     Ferritin; Future  -     Vitamin D 25 hydroxy; Future  -     TSH, 3rd generation with T4 reflex; Future    Celiac disease  -     Ambulatory referral to Gastroenterology; Future          Subjective:      Patient ID: Shruthi Wray is a 33 y o  female  Patient is here for nausea for a week    Neg pregnancy test  Tried zofran didn't work    Constipation, didn't go for 3 days  Tried miralax and stool softeners    Epigastric pain        The following portions of the patient's history were reviewed and updated as appropriate: allergies, current medications, past family history, past medical history, past social history, past surgical history and problem list     Review of Systems   Constitutional: Negative  HENT: Negative  Eyes: Negative  Respiratory: Negative  Cardiovascular: Negative  Gastrointestinal: Positive for abdominal pain and nausea  Musculoskeletal: Negative  Neurological: Negative  Objective:      /72   Pulse 64   Temp 98 1 °F (36 7 °C) (Oral)   Ht 5' 4" (1 626 m)   Wt 58 2 kg (128 lb 6 4 oz)   BMI 22 04 kg/m²          Physical Exam   Constitutional: She is oriented to person, place, and time  She appears well-developed and well-nourished     HENT:   Head: Normocephalic and atraumatic  Eyes: Conjunctivae are normal  Pupils are equal, round, and reactive to light  Neck: Normal range of motion  Neck supple  Cardiovascular: Normal rate and regular rhythm  Pulmonary/Chest: Effort normal and breath sounds normal    Abdominal: Soft  Bowel sounds are normal    Musculoskeletal: Normal range of motion  Neurological: She is alert and oriented to person, place, and time  Skin: Skin is warm and dry

## 2018-09-07 ENCOUNTER — OFFICE VISIT (OUTPATIENT)
Dept: OBGYN CLINIC | Facility: CLINIC | Age: 28
End: 2018-09-07
Payer: COMMERCIAL

## 2018-09-07 VITALS — BODY MASS INDEX: 21.63 KG/M2 | WEIGHT: 126 LBS | DIASTOLIC BLOOD PRESSURE: 62 MMHG | SYSTOLIC BLOOD PRESSURE: 98 MMHG

## 2018-09-07 DIAGNOSIS — Z30.432 ENCOUNTER FOR IUD REMOVAL: Primary | ICD-10-CM

## 2018-09-07 PROCEDURE — 58301 REMOVE INTRAUTERINE DEVICE: CPT | Performed by: PHYSICIAN ASSISTANT

## 2018-09-07 NOTE — PROGRESS NOTES
Shruthi Wray  1990      CC:  IUD removal    S:  33 y o  female here for IUD removal   The reason for her IUD removal is desire for pregnancy  She is taking a prenatal vitamin daily and is also using B6 because she had such terrible hyperemesis last time  We reviewed the risks of IUD removal including pain and irregular bleeding  O:   Blood pressure 98/62, weight 57 2 kg (126 lb), last menstrual period 08/21/2018, currently breastfeeding  Patient appears well and is in no distress  Abdomen is soft and nontender  External genitals are normal without rash or lesion  Vagina is normal without discharge  Cervix is normal without discharge or lesion  IUD strings are normal      PROCEDURE:   IUD strings were grasped with a ring forceps and the IUD was removed by gently pulling on the strings  The IUD was removed in its entirety and was discarded  There were no complications and the patient tolerated the procedure well  A:   IUD removal      P:  Return for yearly exam or sooner as needed

## 2019-02-05 ENCOUNTER — OFFICE VISIT (OUTPATIENT)
Dept: GASTROENTEROLOGY | Facility: CLINIC | Age: 29
End: 2019-02-05
Payer: COMMERCIAL

## 2019-02-05 VITALS
BODY MASS INDEX: 21.86 KG/M2 | HEART RATE: 67 BPM | TEMPERATURE: 98.4 F | SYSTOLIC BLOOD PRESSURE: 122 MMHG | DIASTOLIC BLOOD PRESSURE: 105 MMHG | WEIGHT: 123.4 LBS | HEIGHT: 63 IN

## 2019-02-05 DIAGNOSIS — K59.00 CONSTIPATION, UNSPECIFIED CONSTIPATION TYPE: Primary | ICD-10-CM

## 2019-02-05 DIAGNOSIS — R11.0 NAUSEA: ICD-10-CM

## 2019-02-05 DIAGNOSIS — Z87.19 HISTORY OF CELIAC DISEASE: ICD-10-CM

## 2019-02-05 PROCEDURE — 99204 OFFICE O/P NEW MOD 45 MIN: CPT | Performed by: INTERNAL MEDICINE

## 2019-02-05 RX ORDER — CHOLECALCIFEROL (VITAMIN D3) 125 MCG
CAPSULE ORAL
COMMUNITY
End: 2019-12-03

## 2019-02-05 RX ORDER — MAGNESIUM CARB/ALUMINUM HYDROX 105-160MG
296 TABLET,CHEWABLE ORAL ONCE
Qty: 1 BOTTLE | Refills: 0 | Status: SHIPPED | OUTPATIENT
Start: 2019-02-05 | End: 2019-12-03

## 2019-02-05 RX ORDER — ASPIRIN 81 MG
TABLET, DELAYED RELEASE (ENTERIC COATED) ORAL
COMMUNITY
End: 2019-12-03

## 2019-02-05 NOTE — PROGRESS NOTES
Consultation - [facility]  Dada Salazarjayden 1990 female         Chief Complaint:  Constipation    HPI:  29-year-old female with history of celiac disease reports having issues with constipation for couple of years  She had regular bowel movements prior to her pregnancy 2 years ago but having issues with her bowels since then  She has to strain hard and taking MiraLax once or twice a day but still reports having hard stool  Denies any blood or mucus in the stool  Complaining about occasional nausea  She was diagnosed with celiac disease in 2007 but has been strict on the diet  Good appetite, no recent weight loss  Denies any heartburn acid reflux  Denies any difficulty swallowing  REVIEW OF SYSTEMS: Review of Systems   Constitutional: Negative for activity change, appetite change, chills, diaphoresis, fatigue, fever and unexpected weight change  HENT: Negative for ear discharge, ear pain, facial swelling, hearing loss, nosebleeds, sore throat, tinnitus and voice change  Eyes: Negative for pain, discharge, redness, itching and visual disturbance  Respiratory: Negative for apnea, cough, chest tightness, shortness of breath and wheezing  Cardiovascular: Negative for chest pain and palpitations  Gastrointestinal:        As noted in HPI   Endocrine: Negative for cold intolerance, heat intolerance and polyuria  Genitourinary: Negative for difficulty urinating, dysuria, flank pain, hematuria and urgency  Musculoskeletal: Negative for arthralgias, back pain, gait problem, joint swelling and myalgias  Skin: Negative for rash and wound  Neurological: Negative for dizziness, tremors, seizures, speech difficulty, light-headedness, numbness and headaches  Hematological: Negative for adenopathy  Does not bruise/bleed easily  Psychiatric/Behavioral: Negative for agitation, behavioral problems and confusion  The patient is not nervous/anxious           Past Medical History: Diagnosis Date    Anemia     Celiac disease 2007    Fibroadenoma of breast, right     resolved 2012    Visual impairment       Past Surgical History:   Procedure Laterality Date    BREAST LUMPECTOMY Right     resolved 2012; fibroadenoma    [place]    WISDOM TOOTH EXTRACTION Bilateral 2011     Social History     Social History    Marital status: /Civil Union     Spouse name: N/A    Number of children: N/A    Years of education: N/A     Occupational History    Not on file  Social History Main Topics    Smoking status: Never Smoker    Smokeless tobacco: Never Used    Alcohol use No    Drug use: No    Sexual activity: Yes     Partners: Male     Birth control/ protection: IUD     Other Topics Concern    Not on file     Social History Narrative    ** Merged History Encounter **               Family History   Problem Relation Age of Onset    Diabetes Brother     Learning disabilities Brother     Cancer Maternal Aunt     Breast cancer Maternal Aunt     Mental retardation Maternal Uncle     Cancer Paternal Grandmother      Patient has no known allergies  Current Outpatient Prescriptions   Medication Sig Dispense Refill    Docusate Sodium 100 MG capsule       Melatonin 5 MG TABS       Polyethylene Glycol 3350 (MIRALAX PO)       Prenatal Vit-Fe Fumarate-FA (M-VIT PO)       magnesium citrate (CITROMA) 1 745 g/30 mL oral solution Take 296 mL by mouth once for 1 dose 1 Bottle 0    polyethylene glycol (GOLYTELY) 4000 mL solution Take 4,000 mL by mouth once for 1 dose 4000 mL 0     No current facility-administered medications for this visit  Blood pressure (!) 122/105, pulse 67, temperature 98 4 °F (36 9 °C), temperature source Tympanic, height 5' 3" (1 6 m), weight 56 kg (123 lb 6 4 oz), currently breastfeeding  PHYSICAL EXAM: Physical Exam   Constitutional: She is oriented to person, place, and time   She appears well-developed and well-nourished  HENT:   Head: Normocephalic and atraumatic  Nose: Nose normal    Mouth/Throat: Oropharynx is clear and moist    Eyes: Conjunctivae are normal  Right eye exhibits no discharge  Left eye exhibits no discharge  No scleral icterus  Neck: Neck supple  No JVD present  No tracheal deviation present  No thyromegaly present  Cardiovascular: Normal rate, regular rhythm and normal heart sounds  Exam reveals no gallop and no friction rub  No murmur heard  Pulmonary/Chest: Effort normal and breath sounds normal  No respiratory distress  She has no wheezes  She has no rales  She exhibits no tenderness  Abdominal: Soft  Bowel sounds are normal  She exhibits no distension and no mass  There is no tenderness  There is no rebound and no guarding  No hernia  Musculoskeletal: She exhibits no edema, tenderness or deformity  Lymphadenopathy:     She has no cervical adenopathy  Neurological: She is alert and oriented to person, place, and time  Skin: Skin is warm and dry  No rash noted  No erythema  Psychiatric: She has a normal mood and affect  Her behavior is normal  Thought content normal         Lab Results   Component Value Date    WBC 3 81 (L) 04/13/2018    HGB 12 9 04/13/2018    HCT 40 7 04/13/2018    MCV 87 04/13/2018     04/13/2018     Lab Results   Component Value Date    CALCIUM 8 4 04/13/2018    K 3 6 04/13/2018    CO2 26 04/13/2018     (H) 04/13/2018    BUN 10 04/13/2018    CREATININE 0 61 04/13/2018     Lab Results   Component Value Date    ALT 27 04/13/2018    AST 31 04/13/2018    ALKPHOS 59 04/13/2018     No results found for: INR, PROTIME    No results found  ASSESSMENT & PLAN:    Constipation  Appear to be idiopathic and functional from irritable bowel syndrome  Rule out colorectal lesions including polyps or malignancy  -continue MiraLax regularly  Consider putting her on Amitiza or Linzess after colonoscopy    -Schedule for colonoscopy      -High-fiber diet     -Patient was given instructions about the colonoscopy prep     -Patient was explained about  the risks and benefits of the procedure  Risks including but not limited to bleeding, infection, perforation were explained in detail  Also explained about less than 100% sensitivity with the exam and other alternatives  Nausea  Possible due to constipation but should rule out any upper GI causes including H pylori gastritis or from celiac disease     -schedule for upper endoscopy    History of celiac disease  History of celiac disease for longtime    Patient reports being strict on gluten free diet     -schedule for upper endoscopy to evaluate the small bowel

## 2019-02-05 NOTE — ASSESSMENT & PLAN NOTE
Possible due to constipation but should rule out any upper GI causes including H pylori gastritis or from celiac disease     -schedule for upper endoscopy

## 2019-02-05 NOTE — ASSESSMENT & PLAN NOTE
Appear to be idiopathic and functional from irritable bowel syndrome  Rule out colorectal lesions including polyps or malignancy  -continue MiraLax regularly  Consider putting her on Amitiza or Linzess after colonoscopy    -Schedule for colonoscopy  -High-fiber diet     -Patient was given instructions about the colonoscopy prep     -Patient was explained about  the risks and benefits of the procedure  Risks including but not limited to bleeding, infection, perforation were explained in detail  Also explained about less than 100% sensitivity with the exam and other alternatives

## 2019-02-05 NOTE — LETTER
February 5, 2019     Gaby Wiley  [address] [address]    Patient: Rachel Chavez   YOB: 1990   Date of Visit: 2/5/2019       Dear Dr Garces : Thank you for referring Lourdes Everett to me for evaluation  Below are my notes for this consultation  If you have questions, please do not hesitate to call me  I look forward to following your patient along with you  Sincerely,        Mely Springer MD        CC: No Recipients  Mely Springer MD  2/5/2019  3:20 PM  Sign at close encounter  Consultation - [address] [facility]  Rachel Chavez 1990 female         Chief Complaint:  Constipation    HPI:  60-year-old female with history of celiac disease reports having issues with constipation for couple of years  She had regular bowel movements prior to her pregnancy 2 years ago but having issues with her bowels since then  She has to strain hard and taking MiraLax once or twice a day but still reports having hard stool  Denies any blood or mucus in the stool  Complaining about occasional nausea  She was diagnosed with celiac disease in 2007 but has been strict on the diet  Good appetite, no recent weight loss  Denies any heartburn acid reflux  Denies any difficulty swallowing  REVIEW OF SYSTEMS: Review of Systems   Constitutional: Negative for activity change, appetite change, chills, diaphoresis, fatigue, fever and unexpected weight change  HENT: Negative for ear discharge, ear pain, facial swelling, hearing loss, nosebleeds, sore throat, tinnitus and voice change  Eyes: Negative for pain, discharge, redness, itching and visual disturbance  Respiratory: Negative for apnea, cough, chest tightness, shortness of breath and wheezing  Cardiovascular: Negative for chest pain and palpitations  Gastrointestinal:        As noted in HPI   Endocrine: Negative for cold intolerance, heat intolerance and polyuria     Genitourinary: Negative for difficulty urinating, dysuria, flank pain, hematuria and urgency  Musculoskeletal: Negative for arthralgias, back pain, gait problem, joint swelling and myalgias  Skin: Negative for rash and wound  Neurological: Negative for dizziness, tremors, seizures, speech difficulty, light-headedness, numbness and headaches  Hematological: Negative for adenopathy  Does not bruise/bleed easily  Psychiatric/Behavioral: Negative for agitation, behavioral problems and confusion  The patient is not nervous/anxious  Past Medical History:   Diagnosis Date    Anemia     Celiac disease 2007    Fibroadenoma of breast, right     resolved 2012    Visual impairment       Past Surgical History:   Procedure Laterality Date    BREAST LUMPECTOMY Right     resolved 2012; fibroadenoma    [place]    WISDOM TOOTH EXTRACTION Bilateral 2011     Social History     Social History    Marital status: /Civil Union     Spouse name: N/A    Number of children: N/A    Years of education: N/A     Occupational History    Not on file  Social History Main Topics    Smoking status: Never Smoker    Smokeless tobacco: Never Used    Alcohol use No    Drug use: No    Sexual activity: Yes     Partners: Male     Birth control/ protection: IUD     Other Topics Concern    Not on file     Social History Narrative    ** Merged History Encounter **               Family History   Problem Relation Age of Onset    Diabetes Brother     Learning disabilities Brother     Cancer Maternal Aunt     Breast cancer Maternal Aunt     Mental retardation Maternal Uncle     Cancer Paternal Grandmother      Patient has no known allergies    Current Outpatient Prescriptions   Medication Sig Dispense Refill    Docusate Sodium 100 MG capsule       Melatonin 5 MG TABS       Polyethylene Glycol 3350 (MIRALAX PO)       Prenatal Vit-Fe Fumarate-FA (M-VIT PO)       magnesium citrate (CITROMA) 1 631 g/30 mL oral solution Take 296 mL by mouth once for 1 dose 1 Bottle 0    polyethylene glycol (GOLYTELY) 4000 mL solution Take 4,000 mL by mouth once for 1 dose 4000 mL 0     No current facility-administered medications for this visit  Blood pressure (!) 122/105, pulse 67, temperature 98 4 °F (36 9 °C), temperature source Tympanic, height 5' 3" (1 6 m), weight 56 kg (123 lb 6 4 oz), currently breastfeeding  PHYSICAL EXAM: Physical Exam   Constitutional: She is oriented to person, place, and time  She appears well-developed and well-nourished  HENT:   Head: Normocephalic and atraumatic  Nose: Nose normal    Mouth/Throat: Oropharynx is clear and moist    Eyes: Conjunctivae are normal  Right eye exhibits no discharge  Left eye exhibits no discharge  No scleral icterus  Neck: Neck supple  No JVD present  No tracheal deviation present  No thyromegaly present  Cardiovascular: Normal rate, regular rhythm and normal heart sounds  Exam reveals no gallop and no friction rub  No murmur heard  Pulmonary/Chest: Effort normal and breath sounds normal  No respiratory distress  She has no wheezes  She has no rales  She exhibits no tenderness  Abdominal: Soft  Bowel sounds are normal  She exhibits no distension and no mass  There is no tenderness  There is no rebound and no guarding  No hernia  Musculoskeletal: She exhibits no edema, tenderness or deformity  Lymphadenopathy:     She has no cervical adenopathy  Neurological: She is alert and oriented to person, place, and time  Skin: Skin is warm and dry  No rash noted  No erythema  Psychiatric: She has a normal mood and affect   Her behavior is normal  Thought content normal         Lab Results   Component Value Date    WBC 3 81 (L) 04/13/2018    HGB 12 9 04/13/2018    HCT 40 7 04/13/2018    MCV 87 04/13/2018     04/13/2018     Lab Results   Component Value Date    CALCIUM 8 4 04/13/2018    K 3 6 04/13/2018    CO2 26 04/13/2018     (H) 04/13/2018    BUN 10 04/13/2018    CREATININE 0 61 04/13/2018     Lab Results   Component Value Date    ALT 27 04/13/2018    AST 31 04/13/2018    ALKPHOS 59 04/13/2018     No results found for: INR, PROTIME    No results found  ASSESSMENT & PLAN:    Constipation  Appear to be idiopathic and functional from irritable bowel syndrome  Rule out colorectal lesions including polyps or malignancy  -continue MiraLax regularly  Consider putting her on Amitiza or Linzess after colonoscopy    -Schedule for colonoscopy  -High-fiber diet     -Patient was given instructions about the colonoscopy prep     -Patient was explained about  the risks and benefits of the procedure  Risks including but not limited to bleeding, infection, perforation were explained in detail  Also explained about less than 100% sensitivity with the exam and other alternatives  Nausea  Possible due to constipation but should rule out any upper GI causes including H pylori gastritis or from celiac disease     -schedule for upper endoscopy    History of celiac disease  History of celiac disease for longtime    Patient reports being strict on gluten free diet     -schedule for upper endoscopy to evaluate the small bowel

## 2019-02-05 NOTE — ASSESSMENT & PLAN NOTE
History of celiac disease for longtime    Patient reports being strict on gluten free diet     -schedule for upper endoscopy to evaluate the small bowel

## 2019-02-06 ENCOUNTER — TELEPHONE (OUTPATIENT)
Dept: GASTROENTEROLOGY | Facility: AMBULARY SURGERY CENTER | Age: 29
End: 2019-02-06

## 2019-02-18 NOTE — TELEPHONE ENCOUNTER
DR Levander Snellen PT    Pt called requesting to r/s her 02/21 procedure due to work schedule 
Pt called requesting to cancel her procedure due to insurance issues   Pt will call back to r/s
Pt is rescheduled to 3/27/2019 w/ dr Ashley Roche at [facility]
cxd pt's procedure per pt request due to ins issue
I have personally performed a face to face diagnostic evaluation on this patient. I have reviewed the ACP note and agree with the history, exam and plan of care, except as noted.

## 2019-09-27 ENCOUNTER — OFFICE VISIT (OUTPATIENT)
Dept: OBGYN CLINIC | Facility: CLINIC | Age: 29
End: 2019-09-27
Payer: COMMERCIAL

## 2019-09-27 VITALS — DIASTOLIC BLOOD PRESSURE: 64 MMHG | BODY MASS INDEX: 20.9 KG/M2 | SYSTOLIC BLOOD PRESSURE: 104 MMHG | WEIGHT: 118 LBS

## 2019-09-27 DIAGNOSIS — Z30.09 BIRTH CONTROL COUNSELING: Primary | ICD-10-CM

## 2019-09-27 PROCEDURE — 99213 OFFICE O/P EST LOW 20 MIN: CPT | Performed by: PHYSICIAN ASSISTANT

## 2019-09-27 NOTE — PROGRESS NOTES
Robin [place]  1990      CC: Birth control consultation    S:  30 y o  female here for birth control discussion  She and her  had been trying for a pregnancy but he just lost his job (due to "something stupid he did") and they now want to postpone pregnancy plans       She would like another ParaGard, which she had in the past        Current Outpatient Medications:     Docusate Sodium 100 MG capsule, , Disp: , Rfl:     Melatonin 5 MG TABS, , Disp: , Rfl:     Polyethylene Glycol 3350 (MIRALAX PO), , Disp: , Rfl:     Prenatal Vit-Fe Fumarate-FA (M-VIT PO), , Disp: , Rfl:     magnesium citrate (CITROMA) 1 745 g/30 mL oral solution, Take 296 mL by mouth once for 1 dose, Disp: 1 Bottle, Rfl: 0    polyethylene glycol (GOLYTELY) 4000 mL solution, Take 4,000 mL by mouth once for 1 dose, Disp: 4000 mL, Rfl: 0  Social History     Socioeconomic History    Marital status: /Civil Union     Spouse name: Not on file    Number of children: Not on file    Years of education: Not on file    Highest education level: Not on file   Occupational History    Not on file   Social Needs    Financial resource strain: Not on file    Food insecurity:     Worry: Not on file     Inability: Not on file    Transportation needs:     Medical: Not on file     Non-medical: Not on file   Tobacco Use    Smoking status: Never Smoker    Smokeless tobacco: Never Used   Substance and Sexual Activity    Alcohol use: No    Drug use: No    Sexual activity: Yes     Partners: Male     Birth control/protection: IUD   Lifestyle    Physical activity:     Days per week: Not on file     Minutes per session: Not on file    Stress: Not on file   Relationships    Social connections:     Talks on phone: Not on file     Gets together: Not on file     Attends [employer] service: Not on file     Active member of club or organization: Not on file     Attends meetings of clubs or organizations: Not on file     Relationship status: Not on file    Intimate partner violence:     Fear of current or ex partner: Not on file     Emotionally abused: Not on file     Physically abused: Not on file     Forced sexual activity: Not on file   Other Topics Concern    Not on file   Social History Narrative    ** Merged History Encounter **               Family History   Problem Relation Age of Onset    Diabetes Brother     Learning disabilities Brother     Cancer Maternal Aunt     Breast cancer Maternal Aunt     Mental retardation Maternal Uncle     Cancer Paternal Grandmother      Past Medical History:   Diagnosis Date    Anemia     Celiac disease 2007    Fibroadenoma of breast, right     resolved 2012    Visual impairment        Review of Systems   Respiratory: Negative  Cardiovascular: Negative  Gastrointestinal: Negative for constipation and diarrhea  Genitourinary: Negative for difficulty urinating, pelvic pain, vaginal bleeding, vaginal discharge, itching or odor  O:   Blood pressure 104/64, weight 53 5 kg (118 lb), last menstrual period 09/09/2019, currently breastfeeding  A:  Contraception/birth control    P:   RTO with next MP for ParaGard insertion

## 2019-11-08 ENCOUNTER — TRANSCRIBE ORDERS (OUTPATIENT)
Dept: ADMINISTRATIVE | Age: 29
End: 2019-11-08

## 2019-11-08 ENCOUNTER — APPOINTMENT (OUTPATIENT)
Dept: LAB | Age: 29
End: 2019-11-08
Attending: PREVENTIVE MEDICINE

## 2019-11-08 DIAGNOSIS — Z11.1 SCREENING EXAMINATION FOR PULMONARY TUBERCULOSIS: Primary | ICD-10-CM

## 2019-11-08 DIAGNOSIS — Z11.1 SCREENING EXAMINATION FOR PULMONARY TUBERCULOSIS: ICD-10-CM

## 2019-11-08 PROCEDURE — 86480 TB TEST CELL IMMUN MEASURE: CPT

## 2019-11-08 PROCEDURE — 36415 COLL VENOUS BLD VENIPUNCTURE: CPT

## 2019-11-11 LAB
GAMMA INTERFERON BACKGROUND BLD IA-ACNC: 0.05 IU/ML
M TB IFN-G BLD-IMP: NEGATIVE
M TB IFN-G CD4+ BCKGRND COR BLD-ACNC: -0.01 IU/ML
M TB IFN-G CD4+ BCKGRND COR BLD-ACNC: 0 IU/ML
MITOGEN IGNF BCKGRD COR BLD-ACNC: >10 IU/ML

## 2019-11-14 ENCOUNTER — TELEPHONE (OUTPATIENT)
Dept: OBGYN CLINIC | Facility: CLINIC | Age: 29
End: 2019-11-14

## 2019-11-14 NOTE — TELEPHONE ENCOUNTER
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

## 2019-11-14 NOTE — TELEPHONE ENCOUNTER
Please let the patient know that a decrease in BP is normal/expected in pregnancy, that anemia should not be immediately so severe that she is symptomatic  If she is symptomatic despite eating and hydration however, she should proceed to the ED

## 2019-11-14 NOTE — TELEPHONE ENCOUNTER
Early OB Pt called office today, left voicemail message stating "She is approximately 5 weeks pregnant (LMP is 10/7/19) and is having dizziness and low blood pressure symptoms  Pt is scheduled for an early ultrasound with Dr Eleazar Dawson on 12/5/19  Pt can be reached @ [phone]

## 2019-11-14 NOTE — TELEPHONE ENCOUNTER
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

## 2019-12-03 ENCOUNTER — OFFICE VISIT (OUTPATIENT)
Dept: OBGYN CLINIC | Facility: CLINIC | Age: 29
End: 2019-12-03
Payer: COMMERCIAL

## 2019-12-03 VITALS — WEIGHT: 124 LBS | DIASTOLIC BLOOD PRESSURE: 52 MMHG | SYSTOLIC BLOOD PRESSURE: 100 MMHG | BODY MASS INDEX: 21.97 KG/M2

## 2019-12-03 DIAGNOSIS — N91.1 SECONDARY AMENORRHEA: Primary | ICD-10-CM

## 2019-12-03 PROCEDURE — 76817 TRANSVAGINAL US OBSTETRIC: CPT | Performed by: OBSTETRICS & GYNECOLOGY

## 2019-12-03 RX ORDER — MULTIVITAMIN WITH IRON
100 TABLET ORAL DAILY
COMMUNITY
End: 2020-07-22

## 2019-12-03 NOTE — PROGRESS NOTES
EARLY PREGNANCY ULTRASOUND    Ultrasound Probe Disinfection    A transvaginal ultrasound was performed  Prior to use, disinfection was performed with High Level Disinfection Process (BrandConton)  Probe serial number SLOGA-B: [id number] was used    Basil Claude, MD  19  3:10 PM      SUBJECTIVE    HPI: Allen Stewart is a 30 y o   female here today for early pregnancy ultrasound  Patient's last menstrual period was 10/07/2019 (exact date)    Menses are regular  This pregnancy was desired  She is accompanied by her  and daughter Micah Rushing  OBHx is significant for  x 1, 9lb 1oz  Taking a prenatal vitamin  No Known Allergies    Current Outpatient Medications:     Prenatal Vit-Fe Fumarate-FA (M-VIT PO), , Disp: , Rfl:     pyridoxine (VITAMIN B6) 100 mg tablet, Take 100 mg by mouth daily, Disp: , Rfl:     UNISOM 25 MG tablet, Take by mouth daily at bedtime as needed for sleep, Disp: , Rfl:     Docusate Sodium 100 MG capsule, , Disp: , Rfl:     magnesium citrate (CITROMA) 1 745 g/30 mL oral solution, Take 296 mL by mouth once for 1 dose, Disp: 1 Bottle, Rfl: 0    Melatonin 5 MG TABS, , Disp: , Rfl:     polyethylene glycol (GOLYTELY) 4000 mL solution, Take 4,000 mL by mouth once for 1 dose, Disp: 4000 mL, Rfl: 0    Polyethylene Glycol 3350 (MIRALAX PO), , Disp: , Rfl:       OBJECTIVE  Vitals:    19 1453   BP: 100/52   BP Location: Left arm   Patient Position: Sitting   Cuff Size: Standard   Weight: 56 2 kg (124 lb)         Early OB Ultrasound Procedure Note: Transvaginal US    Technician: Study performed by the interpreting physician    Indications:  Early gestation, dating & viability    Procedure Details   The entire study was done at settings of 6 0 to 8 0 MHz  Gestational Sac: Present  Yolk sac: Present  Crown-rump length is 4cm and calculates to an estimated gestational age of 11 weeks, 5 days  Embryonic cardiac activity is seen at a rate of 172 b/min    Description of fetal anatomy Normal    Cul-de-sac: no fluid  Left ovary: not seen  Right ovary: not seen    Findings:  Viable, rodriguez intrauterine pregnancy      ASSESSMENT  Early pregnancy at 8 weeks 1 days with a calculated ALEX of 7/13/20 based on LMP    PLAN    1 - RTO for OB interview and PN-1 visit  Continue B6 and Unisom  Took zofran last preg which made her very constipated - would prefer to avoid  WIll let us know if the B6 and unisom do not improve her symptoms  All questions were answered & patient expressed understanding      Ebonie Hui MD

## 2019-12-11 ENCOUNTER — TELEPHONE (OUTPATIENT)
Dept: OBGYN CLINIC | Facility: CLINIC | Age: 29
End: 2019-12-11

## 2019-12-11 NOTE — TELEPHONE ENCOUNTER
Patient called said she is having brown discharge that started today  Patient is 9 weeks pregnant   Please advise

## 2019-12-11 NOTE — TELEPHONE ENCOUNTER
Patient calling with one episode of brown spotting today  Denies pain, foul odor, irritation  Reports straining with bowel movement yesterday, has been constipated  Recommended stool softener  Advised to monitor and report any increase in discharge or pain  Patient verbalizes understanding

## 2019-12-17 ENCOUNTER — INITIAL PRENATAL (OUTPATIENT)
Dept: OBGYN CLINIC | Facility: CLINIC | Age: 29
End: 2019-12-17

## 2019-12-17 DIAGNOSIS — Z34.81 PRENATAL CARE, SUBSEQUENT PREGNANCY, FIRST TRIMESTER: ICD-10-CM

## 2019-12-17 DIAGNOSIS — O09.293 PRIOR FETAL MACROSOMIA IN THIRD TRIMESTER, ANTEPARTUM: Primary | ICD-10-CM

## 2019-12-17 PROCEDURE — OBC: Performed by: OBSTETRICS & GYNECOLOGY

## 2019-12-17 NOTE — PROGRESS NOTES
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  * Accompanied by: Telephone intake  *  *Hx of  delivery prior to 36 weeks 6 days :No  *Last Menstrual Period: Pt's LMP was 10/07/2019  *Ultrasound date: 2019   8w1d  *Estimated date of delivery: 2020   * confirmed by LMP    *Signs/Symptoms of Pregnancy   * Mild nausea, resolving   *headaches :No   *cramping/spotting :No   *PICA cravings :No    *Diabetes- if you answer yes, please order 1 hour GTT, 50g   *hx of GDM :No   *BMI >35 :No   *first degree relative with type 2 diabetes :Brother with Type 1, diagnosed at age 3   *hx of PCOS :No   *current metformin use:No   *prior hx of LGA/macrosomia :Yes  Daughter Loy Coats   *AMA with other risk factors :Yes    *Hypertension- if you answer yes, please order preeclampsia labs including 24 hour urine protein   *Hx of chronic HTN :No   *hx of gestational HTN    *hx of preeclampsia, eclampsia, or HELLP syndrome :No    *Infection Screening-    *does the pt have a hx of MRSA? No   *if yes- please follow MRSA protocol and obtain a nasal swab for MRSA culture   *history of herpes? No    *Immunizations:   *influenza vaccine given today: N   Telephone intake   *discussed Tdap vaccine    *Interview education   *[facility] Pregnancy Essentials Book reviewed and discussed   *Handouts given:    *Baby and Me phone miguel ángel guide    *Baby and Me support center    *[facility]     *discussed genetic testing- pt interested: No, not interested     *appointment at [facility] made :No    *Prenatal lab work scripts mailed to patient's home including 1 hour Glucose  *Nurse/Family Partnership- pt may qualify :No    *I have these concerns about this prenatal patient: history of baby with Macrosomia  *Details that I feel the provider should be aware of: same    PN1 visit scheduled   The patient was oriented to our practice and all questions were answered  Packet/labslips mailed to patient's home  Encouraged use of My Chart, however advised not to email any urgent pregnancy concerns         Interviewed by: HARMAN Spencer

## 2020-01-02 ENCOUNTER — APPOINTMENT (OUTPATIENT)
Dept: LAB | Age: 30
End: 2020-01-02

## 2020-01-02 ENCOUNTER — INITIAL PRENATAL (OUTPATIENT)
Dept: OBGYN CLINIC | Facility: CLINIC | Age: 30
End: 2020-01-02

## 2020-01-02 ENCOUNTER — TRANSCRIBE ORDERS (OUTPATIENT)
Dept: ADMINISTRATIVE | Age: 30
End: 2020-01-02

## 2020-01-02 ENCOUNTER — TELEPHONE (OUTPATIENT)
Dept: OBGYN CLINIC | Facility: CLINIC | Age: 30
End: 2020-01-02

## 2020-01-02 VITALS — WEIGHT: 125 LBS | DIASTOLIC BLOOD PRESSURE: 56 MMHG | BODY MASS INDEX: 22.14 KG/M2 | SYSTOLIC BLOOD PRESSURE: 92 MMHG

## 2020-01-02 DIAGNOSIS — Z34.81 PRENATAL CARE, SUBSEQUENT PREGNANCY, FIRST TRIMESTER: Primary | ICD-10-CM

## 2020-01-02 DIAGNOSIS — O09.293 PRIOR FETAL MACROSOMIA IN THIRD TRIMESTER, ANTEPARTUM: ICD-10-CM

## 2020-01-02 PROBLEM — N91.1 SECONDARY AMENORRHEA: Status: RESOLVED | Noted: 2019-12-03 | Resolved: 2020-01-02

## 2020-01-02 LAB
ABO GROUP BLD: NORMAL
BASOPHILS # BLD AUTO: 0.02 THOUSANDS/ΜL (ref 0–0.1)
BASOPHILS NFR BLD AUTO: 0 % (ref 0–1)
BILIRUB UR QL STRIP: NEGATIVE
BLD GP AB SCN SERPL QL: NEGATIVE
CLARITY UR: NORMAL
COLOR UR: YELLOW
EOSINOPHIL # BLD AUTO: 0.04 THOUSAND/ΜL (ref 0–0.61)
EOSINOPHIL NFR BLD AUTO: 1 % (ref 0–6)
ERYTHROCYTE [DISTWIDTH] IN BLOOD BY AUTOMATED COUNT: 13.3 % (ref 11.6–15.1)
GLUCOSE 1H P 50 G GLC PO SERPL-MCNC: 86 MG/DL
GLUCOSE UR STRIP-MCNC: NEGATIVE MG/DL
HBV SURFACE AG SER QL: NORMAL
HCT VFR BLD AUTO: 39.5 % (ref 34.8–46.1)
HGB BLD-MCNC: 12.7 G/DL (ref 11.5–15.4)
HGB UR QL STRIP.AUTO: NEGATIVE
IMM GRANULOCYTES # BLD AUTO: 0.04 THOUSAND/UL (ref 0–0.2)
IMM GRANULOCYTES NFR BLD AUTO: 1 % (ref 0–2)
KETONES UR STRIP-MCNC: NEGATIVE MG/DL
LEUKOCYTE ESTERASE UR QL STRIP: NEGATIVE
LYMPHOCYTES # BLD AUTO: 1.68 THOUSANDS/ΜL (ref 0.6–4.47)
LYMPHOCYTES NFR BLD AUTO: 20 % (ref 14–44)
MCH RBC QN AUTO: 27.9 PG (ref 26.8–34.3)
MCHC RBC AUTO-ENTMCNC: 32.2 G/DL (ref 31.4–37.4)
MCV RBC AUTO: 87 FL (ref 82–98)
MONOCYTES # BLD AUTO: 0.43 THOUSAND/ΜL (ref 0.17–1.22)
MONOCYTES NFR BLD AUTO: 5 % (ref 4–12)
NEUTROPHILS # BLD AUTO: 6.19 THOUSANDS/ΜL (ref 1.85–7.62)
NEUTS SEG NFR BLD AUTO: 73 % (ref 43–75)
NITRITE UR QL STRIP: NEGATIVE
NRBC BLD AUTO-RTO: 0 /100 WBCS
PH UR STRIP.AUTO: 6.5 [PH]
PLATELET # BLD AUTO: 265 THOUSANDS/UL (ref 149–390)
PMV BLD AUTO: 10.6 FL (ref 8.9–12.7)
PROT UR STRIP-MCNC: NEGATIVE MG/DL
RBC # BLD AUTO: 4.56 MILLION/UL (ref 3.81–5.12)
RH BLD: POSITIVE
RUBV IGG SERPL IA-ACNC: >175 IU/ML
SP GR UR STRIP.AUTO: 1.02 (ref 1–1.03)
SPECIMEN EXPIRATION DATE: NORMAL
UROBILINOGEN UR QL STRIP.AUTO: 0.2 E.U./DL
WBC # BLD AUTO: 8.4 THOUSAND/UL (ref 4.31–10.16)

## 2020-01-02 PROCEDURE — 82950 GLUCOSE TEST: CPT

## 2020-01-02 PROCEDURE — G0145 SCR C/V CYTO,THINLAYER,RESCR: HCPCS | Performed by: OBSTETRICS & GYNECOLOGY

## 2020-01-02 PROCEDURE — 36415 COLL VENOUS BLD VENIPUNCTURE: CPT

## 2020-01-02 PROCEDURE — PNV: Performed by: OBSTETRICS & GYNECOLOGY

## 2020-01-02 PROCEDURE — 81003 URINALYSIS AUTO W/O SCOPE: CPT

## 2020-01-02 PROCEDURE — 80081 OBSTETRIC PANEL INC HIV TSTG: CPT

## 2020-01-02 PROCEDURE — 87086 URINE CULTURE/COLONY COUNT: CPT

## 2020-01-02 NOTE — PROGRESS NOTES
First OB prenatal visit  Pap- 4/24/2017-negative  Pap with reflex and GC testing done today  Patient went for first prenatal labs today-pending  Doing well on Unisom and B6 for her nausea  Declined genetic testing  Level 2 scheduled

## 2020-01-02 NOTE — PROGRESS NOTES
[facility] Al is feeling well  Nausea is well controlled  Not interested in genetic screening, aware this is time sensitive  Has level II US scheduled  Viraj delivery  Pap and cultures today  Just had labs drawn, not yet resulted  Early glucola ordered, history of macrosomia with uncomplicated /second stage

## 2020-01-03 LAB
BACTERIA UR CULT: NORMAL
HIV 1+2 AB+HIV1 P24 AG SERPL QL IA: NORMAL
RPR SER QL: NORMAL

## 2020-01-07 LAB
LAB AP GYN PRIMARY INTERPRETATION: NORMAL
LAB AP LMP: NORMAL
Lab: NORMAL

## 2020-01-08 LAB
C TRACH DNA SPEC QL NAA+PROBE: ABNORMAL
N GONORRHOEA DNA SPEC QL NAA+PROBE: ABNORMAL

## 2020-01-31 ENCOUNTER — ROUTINE PRENATAL (OUTPATIENT)
Dept: OBGYN CLINIC | Facility: CLINIC | Age: 30
End: 2020-01-31

## 2020-01-31 VITALS — SYSTOLIC BLOOD PRESSURE: 94 MMHG | BODY MASS INDEX: 24.09 KG/M2 | WEIGHT: 136 LBS | DIASTOLIC BLOOD PRESSURE: 50 MMHG

## 2020-01-31 DIAGNOSIS — Z11.3 SCREEN FOR STD (SEXUALLY TRANSMITTED DISEASE): ICD-10-CM

## 2020-01-31 DIAGNOSIS — Z34.81 PRENATAL CARE, SUBSEQUENT PREGNANCY, FIRST TRIMESTER: Primary | ICD-10-CM

## 2020-01-31 LAB
SL AMB  POCT GLUCOSE, UA: NEGATIVE
SL AMB POCT URINE PROTEIN: NEGATIVE

## 2020-01-31 PROCEDURE — 87591 N.GONORRHOEAE DNA AMP PROB: CPT | Performed by: STUDENT IN AN ORGANIZED HEALTH CARE EDUCATION/TRAINING PROGRAM

## 2020-01-31 PROCEDURE — 87491 CHLMYD TRACH DNA AMP PROBE: CPT | Performed by: STUDENT IN AN ORGANIZED HEALTH CARE EDUCATION/TRAINING PROGRAM

## 2020-01-31 PROCEDURE — PNV: Performed by: STUDENT IN AN ORGANIZED HEALTH CARE EDUCATION/TRAINING PROGRAM

## 2020-01-31 NOTE — ASSESSMENT & PLAN NOTE
-precautions reviewed  -s/p flu  -concerned about weight gain   Prepregnancy BMI 22 with goal 25-35#: TWG = 10, discussed weight gain recommendation, reassured regarding weight gain at this point and recommended walking 5+ time per week

## 2020-01-31 NOTE — PROGRESS NOTES
GC invalid off pap- recollected today via urine  A positive  Declined genetic testing  Level 2 scheduled  Feels well

## 2020-01-31 NOTE — PROGRESS NOTES
32yo  at 16w4d, here for return OB visit  Feeling well overall and without concerns  Good FM  Denies LOF, VB, contractions  No problems with activity  Walking on the treadmill at home while her daughter naps on most days  Very concerned regarding weight gain so far  No questions/concerns  Problem List Items Addressed This Visit        Other    Prenatal care, subsequent pregnancy, first trimester - Primary     -precautions reviewed  -s/p flu  -concerned about weight gain   Prepregnancy BMI 22 with goal 25-35#: TWG = 10, discussed weight gain recommendation, reassured regarding weight gain at this point and recommended walking 5+ time per week           Relevant Orders    POCT urine dip (Completed)      Other Visit Diagnoses     Screen for STD (sexually transmitted disease)        Relevant Orders    Chlamydia/GC amplified DNA by PCR

## 2020-02-01 LAB
C TRACH DNA SPEC QL NAA+PROBE: NEGATIVE
N GONORRHOEA DNA SPEC QL NAA+PROBE: NEGATIVE

## 2020-02-25 NOTE — PATIENT INSTRUCTIONS
Thank you for choosing us for your  care today  If you have any questions about your ultrasound or care, please do not hesitate to contact us or your primary obstetrician  [address] Risk Evaluation Program: [phone] or [phone]    Some general instructions for your pregnancy are:     Exercise: we encourage most pregnant women to get regular physical activity in pregnancy  Exercise has been shown to reduce the risk of several pregnancy-related complications  Unless instructed otherwise, you can aim for 22 minutes per day (150 minutes per week! )   Nutrition: aim for calcium-rich and iron-rich foods as well as healthy sources of protein   Weight: ask your doctor what is the appropriate amount of weight for you to gain in pregnancy  We have nutritionists here if you would like to meet with them   Protect against the flu: get yourself and your entire household vaccinated against influenza  Tell your partner to get vaccinated as well  Good hand hygiene can reduce the spread of this potentially deadly virus  Insist that everyone who is going to hold or be around your baby get vaccinated   Learn about Preeclampsia: preeclampsia is a common, serious complication in pregnancy  A blood pressure of 140mmHg (top number or systolic) OR 86UETG (bottom number or diastolic) is elevated and needs evaluation by your doctor  Ask your doctor early in pregnancy if you should take aspirin (not motrin or tylenol) to prevent preeclampsia  If you were advised to take aspirin to prevent preeclampsia, a daily dose of 162mg or 81mg is advised  One resource to learn more is www  preeclampsia org    If you smoke, try to reduce how many cigarettes you smoke or quit completely  Do not vape       Other warning signs to watch out for in pregnancy or postpartum: chest pain, obstructed breathing or shortness of breath, seizures, thoughts of hurting yourself or your baby, bleeding, a painful or swollen leg, fever, or headache ([facility] POST-BIRTH Warning Signs campaign)  If these happen call 911  Itching is also not normal in pregnancy and if you experience this, especially over your hands and feet, potentially worse at night, notify your doctors

## 2020-02-27 ENCOUNTER — ROUTINE PRENATAL (OUTPATIENT)
Dept: PERINATAL CARE | Facility: CLINIC | Age: 30
End: 2020-02-27
Payer: COMMERCIAL

## 2020-02-27 VITALS
HEART RATE: 77 BPM | BODY MASS INDEX: 25.23 KG/M2 | WEIGHT: 142.4 LBS | SYSTOLIC BLOOD PRESSURE: 112 MMHG | DIASTOLIC BLOOD PRESSURE: 56 MMHG | HEIGHT: 63 IN

## 2020-02-27 DIAGNOSIS — Z36.86 ENCOUNTER FOR ANTENATAL SCREENING FOR CERVICAL LENGTH: ICD-10-CM

## 2020-02-27 DIAGNOSIS — Z36.3 ENCOUNTER FOR ANTENATAL SCREENING FOR MALFORMATIONS: Primary | ICD-10-CM

## 2020-02-27 DIAGNOSIS — Z3A.20 20 WEEKS GESTATION OF PREGNANCY: ICD-10-CM

## 2020-02-27 PROCEDURE — 76805 OB US >/= 14 WKS SNGL FETUS: CPT | Performed by: OBSTETRICS & GYNECOLOGY

## 2020-02-27 PROCEDURE — 99241 PR OFFICE CONSULTATION NEW/ESTAB PATIENT 15 MIN: CPT | Performed by: OBSTETRICS & GYNECOLOGY

## 2020-02-27 PROCEDURE — 76817 TRANSVAGINAL US OBSTETRIC: CPT | Performed by: OBSTETRICS & GYNECOLOGY

## 2020-02-27 NOTE — LETTER
2020     [address] [place] [address]    Patient: Maureen Baker   YOB: 1990   Date of Visit: 2020       Dear Dr Devin Prado: Thank you for referring [address] [place] to me for evaluation  Below are my notes for this consultation  If you have questions, please do not hesitate to call me  I look forward to following your patient along with you  Sincerely,        Glenn Parnell MD        CC: No Recipients  Glenn Parnell MD  2020 12:18 PM  Sign at close encounter  Consultation - Maternal Fetal Medicine   Maureen Baker 30 y o  female MRN: [id number]  Encounter: [id number]    Assessment/Plan     Assessment: Ms Santiago Ozuna is a 30y o  year-old Ellis Hammer with an ALEX of 2020, by Last Menstrual Period at 20w3d, here for anatomic survey  By issue:    Problem List Items Addressed This Visit        Other    20 weeks gestation of pregnancy     Ultrasound has limitations; specifically, while the presence of a normal appearing ultrasound is reassuring, it does not completely preclude the presence of aneuploidy or malformation, and ultrasound cannot detect all abnormalities in children  Today she declined serum aneuploidy screening  I recommended a return visit in 4 weeks time for follow-up of incomplete anatomy  Third trimester growth assessment can be considered in the setting of prior large for gestational age infant             Other Visit Diagnoses     Encounter for  screening for malformations    -  Primary    Encounter for  screening for cervical length            History of Present Illness   Reason for consultation: anatomic survey    Chief Complaint   Patient presents with    Pregnancy Ultrasound     detailed      Referring physician:   Liseth Goode Md  [address]  [facility]  [place], [address]    Dear Dr Devin Prado,    Thank you very much for your kind referral of patient Janusz Cottrell [facility] for Maternal-Fetal Medicine consultation  HPI: As you know, Ms Anish Phillips is a 30y o  year-old  with an ALEX of 2020, by Last Menstrual Period at 20w3d  She has no complaints today  Pregnancy complications: none  Other history is as follows:    Historical Information   OB History    Para Term  AB Living   2 1 1 0 0 1   SAB TAB Ectopic Multiple Live Births   0 0 0 0 1      # Outcome Date GA Lbr Nilo/2nd Weight Sex Delivery Anes PTL Lv   2 Current            1 Term 17 39w3d 04:55 / 00:52 4110 g (9 lb 1 oz) F Vag-Spont EPI N DANY      Birth Comments: IOL     Gynecologic history: Patient's last menstrual period was 10/07/2019 (exact date)  Past Medical History:   Diagnosis Date    Anemia     Celiac disease     Fibroadenoma of breast, right     resolved      Past Medical History:   Diagnosis Date    Anemia     Celiac disease 2007    Fibroadenoma of breast, right     resolved      She denies GI ulcers, asthma, thromboembolism, transfusion, cancer, diabetes, epilepsy, cardiac disease, bleeding disorder, hepatitis or HIV, hypertension, autoimmune disorder, mental illness, opiate use disorder, STIs this pregnancy, stroke, and thyroid disease        Past Surgical History:   Procedure Laterality Date    [place]    WISDOM TOOTH EXTRACTION Bilateral      Social History   Social History     Tobacco Use    Smoking status: Never Smoker    Smokeless tobacco: Never Used   Substance Use Topics    Alcohol use: No    Drug use: No     Family History   Problem Relation Age of Onset    Learning disabilities Brother     Cancer Maternal Aunt     Breast cancer Maternal Aunt     Mental retardation Maternal Uncle     Cancer Paternal Grandmother     No Known Problems Mother     No Known Problems Father     No Known Problems Daughter     Diabetes Maternal Grandmother     No Known Problems Maternal Grandfather        Family history per our office screening tool includes maternal aunt who  at the age of 50 of breast cancer, who she believes has had genetic testing,  but is negative for Ashkenazi Jew ancestry, major anomalies, congenital blindness or deafness, thromboembolism, early onset breast ovarian or colon cancer, early childhood death, diabetes, aneuploidy, genetic condition, bleeding disorder, hypertension or preeclampsia, intellectual disability, mental illness, opiate use disorder, and stroke  Meds/Allergies     Current Outpatient Medications:     Prenatal Vit-Fe Fumarate-FA (M-VIT PO), , Disp: , Rfl:     pyridoxine (VITAMIN B6) 100 mg tablet, Take 100 mg by mouth daily, Disp: , Rfl:     UNISOM 25 MG tablet, Take by mouth daily at bedtime as needed for sleep, Disp: , Rfl:   No Known Allergies    Objective   Vitals: Blood pressure 112/56, pulse 77, height 5' 3" (1 6 m), weight 64 6 kg (142 lb 6 4 oz), last menstrual period 10/07/2019, currently breastfeeding  Body mass index is 25 23 kg/m²  On examination today, she appears well-developed, well-nourished, in no acute distress, with vitals documented above  She is awake, alert and oriented x3  Mood and affect are appropriate  Abdomen is soft and nontender throughout, with no rebound or guarding, and gravid  Fetal data:    [facility] ultrasound report key findings: Ultrasound findings today are as follows: There is a single live intrauterine pregnancy with size equivalent to dates  No gross anomalies were identified however the anatomic survey is incomplete  Amniotic fluid is within normal limits  Transvaginal ultrasound was performed in conjunction with a transabdominal ultrasound to better visualize the cervix  Cervical length measures 44 5mm indicating low risk for spontaneous  birth       ---------------------------------------    At the conclusion of today's encounter, all questions were answered to her satisfaction    Thank you very much for this kind referral and please do not hesitate to contact me with any further questions or concerns      Sincerely,    Cruz Hernandes MD  Attending Physician, [facility]

## 2020-02-27 NOTE — ASSESSMENT & PLAN NOTE
Ultrasound has limitations; specifically, while the presence of a normal appearing ultrasound is reassuring, it does not completely preclude the presence of aneuploidy or malformation, and ultrasound cannot detect all abnormalities in children  Today she declined serum aneuploidy screening  I recommended a return visit in 4 weeks time for follow-up of incomplete anatomy  Third trimester growth assessment can be considered in the setting of prior large for gestational age infant

## 2020-02-27 NOTE — PROGRESS NOTES
Consultation - Maternal Fetal Medicine   Michael Kerns 30 y o  female MRN: [id number]  Encounter: [id number]    Assessment/Plan     Assessment: Ms Cami Clements is a 30y o  year-old Amaury Musa with an ALEX of 2020, by Last Menstrual Period at 20w3d, here for anatomic survey  By issue:    Problem List Items Addressed This Visit        Other    20 weeks gestation of pregnancy     Ultrasound has limitations; specifically, while the presence of a normal appearing ultrasound is reassuring, it does not completely preclude the presence of aneuploidy or malformation, and ultrasound cannot detect all abnormalities in children  Today she declined serum aneuploidy screening  I recommended a return visit in 4 weeks time for follow-up of incomplete anatomy  Third trimester growth assessment can be considered in the setting of prior large for gestational age infant  Other Visit Diagnoses     Encounter for  screening for malformations    -  Primary    Encounter for  screening for cervical length            History of Present Illness   Reason for consultation: anatomic survey    Chief Complaint   Patient presents with    Pregnancy Ultrasound     detailed      Referring physician:   Alexander Phelps Md  [address]  [facility]  [place]    Dear Dr Lawanda Lau,    Thank you very much for your kind referral of patient Michael Kerns for Maternal-Fetal Medicine consultation  HPI: As you know, Ms Cami Clements is a 30y o  year-old  with an ALEX of 2020, by Last Menstrual Period at 20w3d  She has no complaints today  Pregnancy complications: none      Other history is as follows:    Historical Information   OB History    Para Term  AB Living   2 1 1 0 0 1   SAB TAB Ectopic Multiple Live Births   0 0 0 0 1      # Outcome Date GA Lbr Nilo/2nd Weight Sex Delivery Anes PTL Lv   2 Current            1 Term 17 39w3d 04:55 / 00:52 4110 g (9 lb 1 oz) F Vag-Spont EPI N DANY      Birth Comments: IOL     Gynecologic history: Patient's last menstrual period was 10/07/2019 (exact date)  Past Medical History:   Diagnosis Date    Anemia     Celiac disease 2007    Fibroadenoma of breast, right     resolved      Past Medical History:   Diagnosis Date    Anemia     Celiac disease 2007    Fibroadenoma of breast, right     resolved      She denies GI ulcers, asthma, thromboembolism, transfusion, cancer, diabetes, epilepsy, cardiac disease, bleeding disorder, hepatitis or HIV, hypertension, autoimmune disorder, mental illness, opiate use disorder, STIs this pregnancy, stroke, and thyroid disease  Past Surgical History:   Procedure Laterality Date    [place]    WISDOM TOOTH EXTRACTION Bilateral      Social History   Social History     Tobacco Use    Smoking status: Never Smoker    Smokeless tobacco: Never Used   Substance Use Topics    Alcohol use: No    Drug use: No     Family History   Problem Relation Age of Onset    Learning disabilities Brother     Cancer Maternal Aunt     Breast cancer Maternal Aunt     Mental retardation Maternal Uncle     Cancer Paternal Grandmother     No Known Problems Mother     No Known Problems Father     No Known Problems Daughter     Diabetes Maternal Grandmother     No Known Problems Maternal Grandfather        Family history per our office screening tool includes maternal aunt who  at the age of 50 of breast cancer, who she believes has had genetic testing,  but is negative for Ashkenazi Amish ancestry, major anomalies, congenital blindness or deafness, thromboembolism, early onset breast ovarian or colon cancer, early childhood death, diabetes, aneuploidy, genetic condition, bleeding disorder, hypertension or preeclampsia, intellectual disability, mental illness, opiate use disorder, and stroke      Meds/Allergies     Current Outpatient Medications:     Prenatal Vit-Fe Fumarate-FA (M-VIT PO), , Disp: , Rfl:     pyridoxine (VITAMIN B6) 100 mg tablet, Take 100 mg by mouth daily, Disp: , Rfl:     UNISOM 25 MG tablet, Take by mouth daily at bedtime as needed for sleep, Disp: , Rfl:   No Known Allergies    Objective   Vitals: Blood pressure 112/56, pulse 77, height 5' 3" (1 6 m), weight 64 6 kg (142 lb 6 4 oz), last menstrual period 10/07/2019, currently breastfeeding  Body mass index is 25 23 kg/m²  On examination today, she appears well-developed, well-nourished, in no acute distress, with vitals documented above  She is awake, alert and oriented x3  Mood and affect are appropriate  Abdomen is soft and nontender throughout, with no rebound or guarding, and gravid  Fetal data:    [facility] ultrasound report key findings: Ultrasound findings today are as follows: There is a single live intrauterine pregnancy with size equivalent to dates  No gross anomalies were identified however the anatomic survey is incomplete  Amniotic fluid is within normal limits  Transvaginal ultrasound was performed in conjunction with a transabdominal ultrasound to better visualize the cervix  Cervical length measures 44 5mm indicating low risk for spontaneous  birth       ---------------------------------------    At the conclusion of today's encounter, all questions were answered to her satisfaction  Thank you very much for this kind referral and please do not hesitate to contact me with any further questions or concerns      Sincerely,    Shannon Dumont MD  Attending Physician, [place]

## 2020-02-27 NOTE — PROGRESS NOTES
A transvaginal ultrasound was performed  Sonographer note on use of High Level Disinfection Process (Trophon) for transvaginal probe# 1 used, serial [id number]    Boris Gibson RDMS

## 2020-02-28 ENCOUNTER — ROUTINE PRENATAL (OUTPATIENT)
Dept: OBGYN CLINIC | Facility: CLINIC | Age: 30
End: 2020-02-28

## 2020-02-28 VITALS — SYSTOLIC BLOOD PRESSURE: 98 MMHG | WEIGHT: 143 LBS | DIASTOLIC BLOOD PRESSURE: 62 MMHG | BODY MASS INDEX: 25.33 KG/M2

## 2020-02-28 DIAGNOSIS — O09.292 HISTORY OF MACROSOMIA IN INFANT IN PRIOR PREGNANCY, CURRENTLY PREGNANT IN SECOND TRIMESTER: ICD-10-CM

## 2020-02-28 DIAGNOSIS — Z34.82 MULTIGRAVIDA IN SECOND TRIMESTER: ICD-10-CM

## 2020-02-28 DIAGNOSIS — Z34.81 PRENATAL CARE, SUBSEQUENT PREGNANCY, FIRST TRIMESTER: Primary | ICD-10-CM

## 2020-02-28 PROBLEM — Z3A.20 20 WEEKS GESTATION OF PREGNANCY: Status: RESOLVED | Noted: 2020-01-02 | Resolved: 2020-02-28

## 2020-02-28 LAB
SL AMB  POCT GLUCOSE, UA: NORMAL
SL AMB POCT URINE PROTEIN: NORMAL

## 2020-02-28 PROCEDURE — PNV: Performed by: PHYSICIAN ASSISTANT

## 2020-02-28 NOTE — ASSESSMENT & PLAN NOTE
30 y o  female here for routine PN visit at [address] [place] [address]:  Martilda Mike well overall  Good fetal movement  It's a girl  Works as an ER nurse  Currently has an upper respiratory infection with sore throat, denies fevers  Will breast feed - already has a pump  Had what sounds like aura followed by a migraine at work recently - had blurry vision, then hours later developed a bad headache  Will call if this is recurrent     Has MFM f/u for missed views, growth scan

## 2020-02-28 NOTE — PROGRESS NOTES
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

## 2020-03-24 ENCOUNTER — TELEPHONE (OUTPATIENT)
Dept: INTERNAL MEDICINE CLINIC | Facility: CLINIC | Age: 30
End: 2020-03-24

## 2020-03-24 NOTE — TELEPHONE ENCOUNTER
----- Message from Adele Holman sent at 3/24/2020  2:08 PM EDT -----  Regarding: RE: overdue for visit  Scheduled patient for august  ----- Message -----  From: Marvin Molina  Sent: 3/24/2020   2:01 PM EDT  To: Adele Holman  Subject: FW: overdue for visit                            Please  call patient to set up appointment  If moved or not our patient, please find out new PCP info  Thank you      ----- Message -----  From: Zi Sheets  Sent: 3/24/2020   1:58 PM EDT  To: Marvin Molina  Subject: overdue for visit                                Last visit April 2018, please contact for overdue visit

## 2020-03-27 ENCOUNTER — ROUTINE PRENATAL (OUTPATIENT)
Dept: OBGYN CLINIC | Facility: CLINIC | Age: 30
End: 2020-03-27

## 2020-03-27 VITALS — BODY MASS INDEX: 26.04 KG/M2 | DIASTOLIC BLOOD PRESSURE: 58 MMHG | WEIGHT: 147 LBS | SYSTOLIC BLOOD PRESSURE: 102 MMHG

## 2020-03-27 DIAGNOSIS — Z34.92 ENCOUNTER FOR PREGNANCY RELATED EXAMINATION IN SECOND TRIMESTER: ICD-10-CM

## 2020-03-27 DIAGNOSIS — Z34.92 ENCOUNTER FOR SUPERVISION OF NORMAL PREGNANCY IN SECOND TRIMESTER, UNSPECIFIED GRAVIDITY: Primary | ICD-10-CM

## 2020-03-27 DIAGNOSIS — O09.292 HISTORY OF MACROSOMIA IN INFANT IN PRIOR PREGNANCY, CURRENTLY PREGNANT IN SECOND TRIMESTER: ICD-10-CM

## 2020-03-27 LAB
SL AMB  POCT GLUCOSE, UA: NEGATIVE
SL AMB POCT URINE PROTEIN: NEGATIVE

## 2020-03-27 PROCEDURE — PNV: Performed by: NURSE PRACTITIONER

## 2020-03-27 NOTE — PROGRESS NOTES
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

## 2020-03-27 NOTE — ASSESSMENT & PLAN NOTE
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

## 2020-03-27 NOTE — PROGRESS NOTES
Pt presents for a PNV, she is doing well  S/P flu vaccine  L2 done, 28 week  lab slip given today  Urine P/G both negative today

## 2020-03-27 NOTE — ASSESSMENT & PLAN NOTE
Baby #1 with birthweight of >4100gm  This was a  without maternal or fetal complications  Early glucola was WNL  3rd trimester growth surveillance is scheduled

## 2020-03-27 NOTE — PATIENT INSTRUCTIONS
Coronavirus (ESSIJ-20) pregnancy FAQs: Medical experts answer your questions  From [employer] cy  com/0_coronavirus-covid-19-pregnancy-faq-medical-dojlmvu-[id number]-hl_[id number]  bc (courtesy of [facility])  As of 3/18/20  By Sangeetha Garay 40  Medically reviewed by Society for Maternal-Fetal Medicine       We asked parents-to-be to send us their most pressing questions about coronavirus (COVID-19)  Among them: Is it still safe to deliver in a hospital? What if my ob-gyn has the virus? We sent those questions to the top docs at the Society for Maternal-Fetal Medicine  Here are their answers  The coronavirus (COVID-19) pandemic has been declared a national emergency in the [facility] by [employer]  Moms-to-be like you are concerned about everything from getting medicines to managing disruptions at work  But above and beyond any worry about lifestyle changes is a focus on your health and the impact of COVID-19 on your pregnancy  We asked obstetrics doctors who handle the most complicated pregnancy issues to answer your questions about the coronavirus  Here are their responses, provided by Dr Rolan Strauss and her colleagues at the Society for [place] [employer]  Am I at more risk for COVID-19 if I'm pregnant? We don't know  Pregnancy does change your immune system in ways that might make you more susceptible to viral respiratory infections like COVID-19  If you become infected, you might also be at higher risk for more severe illness compared to the general population  Although this does not appear to be the case with COVID-19, based on limited data so far, a higher risk has been true for pregnant women with other coronavirus infections (SARS-CoV and MERS-CoV) and other viral respiratory infections, such as flu  It's important to take preventive actions to avoid infection, such as washing your hands often and avoiding people who are sick      How might coronavirus affect my pregnancy? Again, we don't know  Women with other coronavirus infections (such as SARS-CoV) did not have miscarriage or stillbirth at higher rates than the general population  We know that having other respiratory viral infections during pregnancy, such as flu, has been associated with problems like low birth weight and  birth  Also, having a high fever early in pregnancy may increase the risk of certain birth defects  Could I transmit coronavirus to my baby during pregnancy or delivery? We don't know whether you could transmit COVID-19 to your baby before or during delivery  However, among the few case studies of infants born to mothers with COVID-23 published in peer-reviewed literature, none of the infants tested positive for the virus  Also, there was no virus detected in samples of amniotic fluid or breast milk  There have been a few reports of newborns as young as a few days old with infection, suggesting that a mother can transmit the infection to her infant through close contact after delivery  There have been no reports of mother-to-baby transmission for other coronaviruses (MERS-CoV and SARS-CoV), although only limited information is available  Is it safe for me to deliver at a hospital where there have been COVID-19 cases? Yes  We know that COVID-19 is a very scary virus  The good news is that hospitals are taking great precautions to keep patients and healthcare providers safe  When a patient is even suspected to have COVID-19, they are placed in a negative pressure room  (Think of these rooms as vacuums that suck and filter the air so it's safe for the other people in the hospital)  This makes it possible for you to deliver at the hospital without putting you or your baby at risk  Hospitals are also implementing stricter visiting policies to keep patients safe  It's worth calling your hospital to check if there are any new regulations to be aware of      What plans should I make now in case the hospital system is overwhelmed when it's time for me to deliver? This is a great question to talk with your doctor or midwife about when you're 34 to 36 weeks pregnant  Every hospital is making different plans for dealing with this scenario  I work in healthcare  Should I ask my doctor to excuse me from work until the baby is born? What if I work in a school, the travel [employer], or some other high-risk setting? Healthcare facilities should take care to limit the exposure of pregnant employees to patients with confirmed or suspected COVID-19, just as they would with other infectious cases  If you continue working, be sure to follow the CDC's risk assessment and infection control guidelines  If you work in a school, travel [employer], or other high-risk setting, talk with your employer about what it's doing to protect employees and minimize infection risks  Wash your hands often  What if my OB gets COVID-19? If your doctor or midwife tests positive for COVID-19, they will need to be quarantined until they recover and are no longer at risk of transmitting the virus  In this case, you'll be assigned to another OB in your doctor's practice (or you may choose another practitioner yourself)  Ask your new OB or your doctor's office if you should self-quarantine or be tested for the virus  (It will depend on when you last saw your provider and when that person tested positive )    Should we hold off on trying to conceive because of COVID-19? At this time, there's no reason to hold off on trying to get pregnant, but the data we have is really limited  For example, we don't think the virus causes birth defects or increases your risk of miscarriage  But we don't know whether you could transmit COVID-19 to your baby before or during delivery  We also don't know if the virus lives in semen or can be sexually transmitted      We have a babymoon scheduled in the next few months - should we cancel? If you're planning to travel internationally or on a cruise, you should strongly consider canceling  At this time, the virus has reached more than 140 countries, and there are travel bans to [place], most of [place], and [place]  Places where large numbers of people gather are at highest risk, especially airports and cruise ships  If you're planning travel in the [place] , note that any travel setting increases your risk of exposure, and there may be travel bans even in [place] by the time you're scheduled to go  Even if you're state is not blocked, you'll definitely want to avoid traveling to communities where the virus is spreading  To find out where these places are, check The [place] Times map based on CDC data  For the most current advice to help you avoid exposure, check the CDC's COVID-19 travel page  Will the hospital separate me from my  and keep the baby in quarantine? If you test positive for COVID-19 or have been exposed but have no symptoms, the CDC, [employer] of Obstetricians and Gynecologists, and the Society for [place] [employer] all recommend that you be  from your baby to decrease the risk of transmission to the baby  This would last until you are no longer at risk of transmitting the virus  If you do not have COVID-19 and have not been exposed to the virus, the hospital will not separate you from your   My hospital is restricting visitors and only allowing one support person  If my support person leaves after the delivery, will they be allowed to come back? Every hospital has different policies  Contact your hospital or labor and delivery unit a week or so before delivery to get the most up-to-date restrictions  In general, if your support person needs to leave, they would be allowed back unless they knew they were exposed to COVID-19 after leaving your company    [facility] understands that the coronavirus (COVID-19) pandemic is an evolving story and that your questions will change over time  We'll continue asking moms and dads in our Community what they want to know, and we'll get the answers from experts to keep them - and you - informed and supported  My mom was planning to fly here to help me care for my new baby after delivery  Should I tell her not to come? Yes  If your mom is over 61 or has any serious chronic medical conditions (such as heart disease, lung disease, or diabetes), she is at higher risk of serious illness from COVID-19 and should avoid air travel  And remember that any travel setting increases a person's risk of exposure  So, it may be risky to have her around the baby after she has been traveling  For the most current advice on traveling, check the CDC's COVID-19 travel page  [facility] understands that the coronavirus pandemic is an evolving story and that your questions will change over time  We'll continue asking moms and dads in our Community what they want to know, and we'll get the answers from experts to keep them - and you - informed and supported

## 2020-04-07 ENCOUNTER — TELEPHONE (OUTPATIENT)
Dept: PERINATAL CARE | Facility: CLINIC | Age: 30
End: 2020-04-07

## 2020-04-08 ENCOUNTER — ULTRASOUND (OUTPATIENT)
Dept: PERINATAL CARE | Facility: CLINIC | Age: 30
End: 2020-04-08
Payer: COMMERCIAL

## 2020-04-08 VITALS
SYSTOLIC BLOOD PRESSURE: 99 MMHG | BODY MASS INDEX: 26.61 KG/M2 | WEIGHT: 150.2 LBS | HEIGHT: 63 IN | DIASTOLIC BLOOD PRESSURE: 53 MMHG | HEART RATE: 79 BPM

## 2020-04-08 DIAGNOSIS — Z36.89 ENCOUNTER FOR FETAL ANATOMIC SURVEY: ICD-10-CM

## 2020-04-08 DIAGNOSIS — Z3A.26 26 WEEKS GESTATION OF PREGNANCY: Primary | ICD-10-CM

## 2020-04-08 PROCEDURE — 76816 OB US FOLLOW-UP PER FETUS: CPT | Performed by: OBSTETRICS & GYNECOLOGY

## 2020-04-15 ENCOUNTER — APPOINTMENT (OUTPATIENT)
Dept: LAB | Age: 30
End: 2020-04-15
Payer: COMMERCIAL

## 2020-04-15 DIAGNOSIS — Z34.92 ENCOUNTER FOR PREGNANCY RELATED EXAMINATION IN SECOND TRIMESTER: ICD-10-CM

## 2020-04-15 LAB
BASOPHILS # BLD AUTO: 0.01 THOUSANDS/ΜL (ref 0–0.1)
BASOPHILS NFR BLD AUTO: 0 % (ref 0–1)
EOSINOPHIL # BLD AUTO: 0.04 THOUSAND/ΜL (ref 0–0.61)
EOSINOPHIL NFR BLD AUTO: 1 % (ref 0–6)
ERYTHROCYTE [DISTWIDTH] IN BLOOD BY AUTOMATED COUNT: 13.7 % (ref 11.6–15.1)
GLUCOSE 1H P 50 G GLC PO SERPL-MCNC: 96 MG/DL
HCT VFR BLD AUTO: 33.6 % (ref 34.8–46.1)
HGB BLD-MCNC: 10.6 G/DL (ref 11.5–15.4)
IMM GRANULOCYTES # BLD AUTO: 0.03 THOUSAND/UL (ref 0–0.2)
IMM GRANULOCYTES NFR BLD AUTO: 1 % (ref 0–2)
LYMPHOCYTES # BLD AUTO: 1.43 THOUSANDS/ΜL (ref 0.6–4.47)
LYMPHOCYTES NFR BLD AUTO: 23 % (ref 14–44)
MCH RBC QN AUTO: 28 PG (ref 26.8–34.3)
MCHC RBC AUTO-ENTMCNC: 31.5 G/DL (ref 31.4–37.4)
MCV RBC AUTO: 89 FL (ref 82–98)
MONOCYTES # BLD AUTO: 0.43 THOUSAND/ΜL (ref 0.17–1.22)
MONOCYTES NFR BLD AUTO: 7 % (ref 4–12)
NEUTROPHILS # BLD AUTO: 4.3 THOUSANDS/ΜL (ref 1.85–7.62)
NEUTS SEG NFR BLD AUTO: 68 % (ref 43–75)
NRBC BLD AUTO-RTO: 0 /100 WBCS
PLATELET # BLD AUTO: 173 THOUSANDS/UL (ref 149–390)
PMV BLD AUTO: 11.5 FL (ref 8.9–12.7)
RBC # BLD AUTO: 3.78 MILLION/UL (ref 3.81–5.12)
RPR SER QL: NORMAL
WBC # BLD AUTO: 6.24 THOUSAND/UL (ref 4.31–10.16)

## 2020-04-15 PROCEDURE — 82950 GLUCOSE TEST: CPT

## 2020-04-15 PROCEDURE — 36415 COLL VENOUS BLD VENIPUNCTURE: CPT

## 2020-04-15 PROCEDURE — 85025 COMPLETE CBC W/AUTO DIFF WBC: CPT

## 2020-04-15 PROCEDURE — 86592 SYPHILIS TEST NON-TREP QUAL: CPT

## 2020-04-24 ENCOUNTER — ROUTINE PRENATAL (OUTPATIENT)
Dept: OBGYN CLINIC | Facility: CLINIC | Age: 30
End: 2020-04-24
Payer: COMMERCIAL

## 2020-04-24 VITALS — BODY MASS INDEX: 27.07 KG/M2 | SYSTOLIC BLOOD PRESSURE: 100 MMHG | DIASTOLIC BLOOD PRESSURE: 60 MMHG | WEIGHT: 152.8 LBS

## 2020-04-24 DIAGNOSIS — K90.0 CD (CELIAC DISEASE): ICD-10-CM

## 2020-04-24 DIAGNOSIS — Z34.92 ENCOUNTER FOR SUPERVISION OF NORMAL PREGNANCY IN SECOND TRIMESTER, UNSPECIFIED GRAVIDITY: Primary | ICD-10-CM

## 2020-04-24 DIAGNOSIS — Z34.93 PREGNANCY, OBSTETRICAL CARE, THIRD TRIMESTER: ICD-10-CM

## 2020-04-24 DIAGNOSIS — Z23 NEED FOR TDAP VACCINATION: ICD-10-CM

## 2020-04-24 LAB
SL AMB  POCT GLUCOSE, UA: NEGATIVE
SL AMB POCT URINE PROTEIN: NEGATIVE

## 2020-04-24 PROCEDURE — 90471 IMMUNIZATION ADMIN: CPT

## 2020-04-24 PROCEDURE — PNV: Performed by: STUDENT IN AN ORGANIZED HEALTH CARE EDUCATION/TRAINING PROGRAM

## 2020-04-24 PROCEDURE — 90715 TDAP VACCINE 7 YRS/> IM: CPT

## 2020-04-30 ENCOUNTER — HOSPITAL ENCOUNTER (OUTPATIENT)
Facility: HOSPITAL | Age: 30
Setting detail: OBSERVATION
Discharge: HOME | End: 2020-05-01
Attending: EMERGENCY MEDICINE | Admitting: INTERNAL MEDICINE
Payer: COMMERCIAL

## 2020-04-30 ENCOUNTER — APPOINTMENT (EMERGENCY)
Dept: CT IMAGING | Facility: HOSPITAL | Age: 30
End: 2020-04-30
Payer: COMMERCIAL

## 2020-04-30 ENCOUNTER — APPOINTMENT (EMERGENCY)
Dept: RADIOLOGY | Facility: HOSPITAL | Age: 30
End: 2020-04-30
Payer: COMMERCIAL

## 2020-04-30 ENCOUNTER — TELEPHONE (OUTPATIENT)
Dept: OBGYN CLINIC | Facility: CLINIC | Age: 30
End: 2020-04-30

## 2020-04-30 DIAGNOSIS — J18.9 PNEUMONIA: Primary | ICD-10-CM

## 2020-04-30 DIAGNOSIS — Z34.93 THIRD TRIMESTER PREGNANCY AT LESS THAN 36 WEEKS: ICD-10-CM

## 2020-04-30 DIAGNOSIS — R06.00 DOE (DYSPNEA ON EXERTION): ICD-10-CM

## 2020-04-30 PROBLEM — D64.9 ANEMIA: Status: ACTIVE | Noted: 2020-04-30

## 2020-04-30 PROBLEM — R06.02 SOB (SHORTNESS OF BREATH): Status: ACTIVE | Noted: 2020-04-30

## 2020-04-30 PROBLEM — Z3A.29 29 WEEKS GESTATION OF PREGNANCY: Status: ACTIVE | Noted: 2020-04-30

## 2020-04-30 LAB
ALBUMIN SERPL BCP-MCNC: 2.5 G/DL (ref 3.5–5)
ALP SERPL-CCNC: 59 U/L (ref 46–116)
ALT SERPL W P-5'-P-CCNC: 21 U/L (ref 12–78)
ANION GAP SERPL CALCULATED.3IONS-SCNC: 9 MMOL/L (ref 4–13)
APTT PPP: 30 SECONDS (ref 23–37)
AST SERPL W P-5'-P-CCNC: 46 U/L (ref 5–45)
BACTERIA UR QL AUTO: ABNORMAL /HPF
BASOPHILS # BLD AUTO: 0.01 THOUSANDS/ΜL (ref 0–0.1)
BASOPHILS NFR BLD AUTO: 0 % (ref 0–1)
BILIRUB SERPL-MCNC: 0.18 MG/DL (ref 0.2–1)
BILIRUB UR QL STRIP: NEGATIVE
BUN SERPL-MCNC: 8 MG/DL (ref 5–25)
CALCIUM SERPL-MCNC: 8.1 MG/DL (ref 8.3–10.1)
CHLORIDE SERPL-SCNC: 105 MMOL/L (ref 100–108)
CLARITY UR: ABNORMAL
CO2 SERPL-SCNC: 23 MMOL/L (ref 21–32)
COLOR UR: YELLOW
CREAT SERPL-MCNC: 0.48 MG/DL (ref 0.6–1.3)
D DIMER PPP FEU-MCNC: 0.8 UG/ML FEU
EOSINOPHIL # BLD AUTO: 0.02 THOUSAND/ΜL (ref 0–0.61)
EOSINOPHIL NFR BLD AUTO: 0 % (ref 0–6)
ERYTHROCYTE [DISTWIDTH] IN BLOOD BY AUTOMATED COUNT: 13.5 % (ref 11.6–15.1)
GFR SERPL CREATININE-BSD FRML MDRD: 134 ML/MIN/1.73SQ M
GLUCOSE SERPL-MCNC: 92 MG/DL (ref 65–140)
GLUCOSE UR STRIP-MCNC: NEGATIVE MG/DL
HCT VFR BLD AUTO: 33.4 % (ref 34.8–46.1)
HGB BLD-MCNC: 10.7 G/DL (ref 11.5–15.4)
HGB UR QL STRIP.AUTO: NEGATIVE
IMM GRANULOCYTES # BLD AUTO: 0.03 THOUSAND/UL (ref 0–0.2)
IMM GRANULOCYTES NFR BLD AUTO: 1 % (ref 0–2)
INR PPP: 1.03 (ref 0.84–1.19)
KETONES UR STRIP-MCNC: NEGATIVE MG/DL
L PNEUMO1 AG UR QL IA.RAPID: NEGATIVE
LEUKOCYTE ESTERASE UR QL STRIP: ABNORMAL
LYMPHOCYTES # BLD AUTO: 0.65 THOUSANDS/ΜL (ref 0.6–4.47)
LYMPHOCYTES NFR BLD AUTO: 14 % (ref 14–44)
MCH RBC QN AUTO: 28.1 PG (ref 26.8–34.3)
MCHC RBC AUTO-ENTMCNC: 32 G/DL (ref 31.4–37.4)
MCV RBC AUTO: 88 FL (ref 82–98)
MONOCYTES # BLD AUTO: 0.39 THOUSAND/ΜL (ref 0.17–1.22)
MONOCYTES NFR BLD AUTO: 8 % (ref 4–12)
NEUTROPHILS # BLD AUTO: 3.53 THOUSANDS/ΜL (ref 1.85–7.62)
NEUTS SEG NFR BLD AUTO: 77 % (ref 43–75)
NITRITE UR QL STRIP: NEGATIVE
NON-SQ EPI CELLS URNS QL MICRO: ABNORMAL /HPF
NRBC BLD AUTO-RTO: 0 /100 WBCS
PH UR STRIP.AUTO: 7 [PH]
PLATELET # BLD AUTO: 163 THOUSANDS/UL (ref 149–390)
PMV BLD AUTO: 11.2 FL (ref 8.9–12.7)
POTASSIUM SERPL-SCNC: 3.6 MMOL/L (ref 3.5–5.3)
PROCALCITONIN SERPL-MCNC: <0.05 NG/ML
PROT SERPL-MCNC: 6.1 G/DL (ref 6.4–8.2)
PROT UR STRIP-MCNC: NEGATIVE MG/DL
PROTHROMBIN TIME: 12.9 SECONDS (ref 11.6–14.5)
RBC # BLD AUTO: 3.81 MILLION/UL (ref 3.81–5.12)
RBC #/AREA URNS AUTO: ABNORMAL /HPF
S PNEUM AG UR QL: NEGATIVE
SARS-COV-2 RNA RESP QL NAA+PROBE: NEGATIVE
SODIUM SERPL-SCNC: 137 MMOL/L (ref 136–145)
SP GR UR STRIP.AUTO: 1.01 (ref 1–1.03)
TROPONIN I SERPL-MCNC: <0.02 NG/ML
TROPONIN I SERPL-MCNC: <0.02 NG/ML
UROBILINOGEN UR QL STRIP.AUTO: 0.2 E.U./DL
WBC # BLD AUTO: 4.63 THOUSAND/UL (ref 4.31–10.16)
WBC #/AREA URNS AUTO: ABNORMAL /HPF

## 2020-04-30 PROCEDURE — 87449 NOS EACH ORGANISM AG IA: CPT | Performed by: INTERNAL MEDICINE

## 2020-04-30 PROCEDURE — 80053 COMPREHEN METABOLIC PANEL: CPT | Performed by: EMERGENCY MEDICINE

## 2020-04-30 PROCEDURE — 99241 PR OFFICE CONSULTATION NEW/ESTAB PATIENT 15 MIN: CPT | Performed by: STUDENT IN AN ORGANIZED HEALTH CARE EDUCATION/TRAINING PROGRAM

## 2020-04-30 PROCEDURE — 81001 URINALYSIS AUTO W/SCOPE: CPT | Performed by: EMERGENCY MEDICINE

## 2020-04-30 PROCEDURE — 96360 HYDRATION IV INFUSION INIT: CPT

## 2020-04-30 PROCEDURE — 84484 ASSAY OF TROPONIN QUANT: CPT | Performed by: EMERGENCY MEDICINE

## 2020-04-30 PROCEDURE — 99285 EMERGENCY DEPT VISIT HI MDM: CPT

## 2020-04-30 PROCEDURE — 87635 SARS-COV-2 COVID-19 AMP PRB: CPT | Performed by: EMERGENCY MEDICINE

## 2020-04-30 PROCEDURE — 84145 PROCALCITONIN (PCT): CPT | Performed by: INTERNAL MEDICINE

## 2020-04-30 PROCEDURE — 36415 COLL VENOUS BLD VENIPUNCTURE: CPT | Performed by: EMERGENCY MEDICINE

## 2020-04-30 PROCEDURE — 85379 FIBRIN DEGRADATION QUANT: CPT | Performed by: EMERGENCY MEDICINE

## 2020-04-30 PROCEDURE — 71045 X-RAY EXAM CHEST 1 VIEW: CPT

## 2020-04-30 PROCEDURE — 85730 THROMBOPLASTIN TIME PARTIAL: CPT | Performed by: EMERGENCY MEDICINE

## 2020-04-30 PROCEDURE — 93005 ELECTROCARDIOGRAM TRACING: CPT

## 2020-04-30 PROCEDURE — 71275 CT ANGIOGRAPHY CHEST: CPT

## 2020-04-30 PROCEDURE — 99285 EMERGENCY DEPT VISIT HI MDM: CPT | Performed by: EMERGENCY MEDICINE

## 2020-04-30 PROCEDURE — 85025 COMPLETE CBC W/AUTO DIFF WBC: CPT | Performed by: EMERGENCY MEDICINE

## 2020-04-30 PROCEDURE — 99220 PR INITIAL OBSERVATION CARE/DAY 70 MINUTES: CPT | Performed by: INTERNAL MEDICINE

## 2020-04-30 PROCEDURE — 85610 PROTHROMBIN TIME: CPT | Performed by: EMERGENCY MEDICINE

## 2020-04-30 PROCEDURE — 87086 URINE CULTURE/COLONY COUNT: CPT | Performed by: EMERGENCY MEDICINE

## 2020-04-30 RX ORDER — PYRIDOXINE HCL (VITAMIN B6) 50 MG
100 TABLET ORAL DAILY
Status: DISCONTINUED | OUTPATIENT
Start: 2020-05-01 | End: 2020-05-01 | Stop reason: HOSPADM

## 2020-04-30 RX ADMIN — AZITHROMYCIN 500 MG: 500 INJECTION, POWDER, LYOPHILIZED, FOR SOLUTION INTRAVENOUS at 18:13

## 2020-04-30 RX ADMIN — IOHEXOL 85 ML: 350 INJECTION, SOLUTION INTRAVENOUS at 17:02

## 2020-04-30 RX ADMIN — SODIUM CHLORIDE 500 ML: 0.9 INJECTION, SOLUTION INTRAVENOUS at 15:23

## 2020-05-01 ENCOUNTER — TRANSITIONAL CARE MANAGEMENT (OUTPATIENT)
Dept: INTERNAL MEDICINE CLINIC | Facility: CLINIC | Age: 30
End: 2020-05-01

## 2020-05-01 VITALS
SYSTOLIC BLOOD PRESSURE: 84 MMHG | WEIGHT: 153.66 LBS | TEMPERATURE: 98.1 F | HEART RATE: 69 BPM | OXYGEN SATURATION: 96 % | BODY MASS INDEX: 27.22 KG/M2 | DIASTOLIC BLOOD PRESSURE: 52 MMHG | RESPIRATION RATE: 18 BRPM

## 2020-05-01 LAB
ALBUMIN SERPL BCP-MCNC: 2.2 G/DL (ref 3.5–5)
ALP SERPL-CCNC: 56 U/L (ref 46–116)
ALT SERPL W P-5'-P-CCNC: 14 U/L (ref 12–78)
ANION GAP SERPL CALCULATED.3IONS-SCNC: 11 MMOL/L (ref 4–13)
AST SERPL W P-5'-P-CCNC: 18 U/L (ref 5–45)
ATRIAL RATE: 88 BPM
BASOPHILS # BLD AUTO: 0.01 THOUSANDS/ΜL (ref 0–0.1)
BASOPHILS NFR BLD AUTO: 0 % (ref 0–1)
BILIRUB SERPL-MCNC: 0.14 MG/DL (ref 0.2–1)
BUN SERPL-MCNC: 5 MG/DL (ref 5–25)
CALCIUM SERPL-MCNC: 7.5 MG/DL (ref 8.3–10.1)
CHLORIDE SERPL-SCNC: 108 MMOL/L (ref 100–108)
CO2 SERPL-SCNC: 21 MMOL/L (ref 21–32)
CREAT SERPL-MCNC: 0.53 MG/DL (ref 0.6–1.3)
EOSINOPHIL # BLD AUTO: 0.01 THOUSAND/ΜL (ref 0–0.61)
EOSINOPHIL NFR BLD AUTO: 0 % (ref 0–6)
ERYTHROCYTE [DISTWIDTH] IN BLOOD BY AUTOMATED COUNT: 13.8 % (ref 11.6–15.1)
GFR SERPL CREATININE-BSD FRML MDRD: 130 ML/MIN/1.73SQ M
GLUCOSE SERPL-MCNC: 108 MG/DL (ref 65–140)
HCT VFR BLD AUTO: 31.2 % (ref 34.8–46.1)
HGB BLD-MCNC: 9.8 G/DL (ref 11.5–15.4)
IMM GRANULOCYTES # BLD AUTO: 0.03 THOUSAND/UL (ref 0–0.2)
IMM GRANULOCYTES NFR BLD AUTO: 1 % (ref 0–2)
LYMPHOCYTES # BLD AUTO: 0.9 THOUSANDS/ΜL (ref 0.6–4.47)
LYMPHOCYTES NFR BLD AUTO: 23 % (ref 14–44)
MCH RBC QN AUTO: 27.5 PG (ref 26.8–34.3)
MCHC RBC AUTO-ENTMCNC: 31.4 G/DL (ref 31.4–37.4)
MCV RBC AUTO: 87 FL (ref 82–98)
MONOCYTES # BLD AUTO: 0.42 THOUSAND/ΜL (ref 0.17–1.22)
MONOCYTES NFR BLD AUTO: 11 % (ref 4–12)
NEUTROPHILS # BLD AUTO: 2.62 THOUSANDS/ΜL (ref 1.85–7.62)
NEUTS SEG NFR BLD AUTO: 65 % (ref 43–75)
NRBC BLD AUTO-RTO: 0 /100 WBCS
P AXIS: 43 DEGREES
PLATELET # BLD AUTO: 133 THOUSANDS/UL (ref 149–390)
PMV BLD AUTO: 11.1 FL (ref 8.9–12.7)
POTASSIUM SERPL-SCNC: 3.4 MMOL/L (ref 3.5–5.3)
PR INTERVAL: 140 MS
PROCALCITONIN SERPL-MCNC: <0.05 NG/ML
PROT SERPL-MCNC: 5.6 G/DL (ref 6.4–8.2)
QRS AXIS: 46 DEGREES
QRSD INTERVAL: 76 MS
QT INTERVAL: 334 MS
QTC INTERVAL: 404 MS
RBC # BLD AUTO: 3.57 MILLION/UL (ref 3.81–5.12)
SARS-COV-2 RNA RESP QL NAA+PROBE: NEGATIVE
SODIUM SERPL-SCNC: 140 MMOL/L (ref 136–145)
T WAVE AXIS: 24 DEGREES
VENTRICULAR RATE: 88 BPM
WBC # BLD AUTO: 3.99 THOUSAND/UL (ref 4.31–10.16)

## 2020-05-01 PROCEDURE — TCMPR

## 2020-05-01 PROCEDURE — 85025 COMPLETE CBC W/AUTO DIFF WBC: CPT | Performed by: INTERNAL MEDICINE

## 2020-05-01 PROCEDURE — 84145 PROCALCITONIN (PCT): CPT | Performed by: INTERNAL MEDICINE

## 2020-05-01 PROCEDURE — NC001 PR NO CHARGE: Performed by: OBSTETRICS & GYNECOLOGY

## 2020-05-01 PROCEDURE — 93010 ELECTROCARDIOGRAM REPORT: CPT | Performed by: INTERNAL MEDICINE

## 2020-05-01 PROCEDURE — 80053 COMPREHEN METABOLIC PANEL: CPT | Performed by: INTERNAL MEDICINE

## 2020-05-01 PROCEDURE — 87635 SARS-COV-2 COVID-19 AMP PRB: CPT | Performed by: INTERNAL MEDICINE

## 2020-05-01 PROCEDURE — 99217 PR OBSERVATION CARE DISCHARGE MANAGEMENT: CPT | Performed by: INTERNAL MEDICINE

## 2020-05-01 RX ORDER — CEFDINIR 300 MG/1
300 CAPSULE ORAL EVERY 12 HOURS SCHEDULED
Qty: 10 CAPSULE | Refills: 0 | Status: SHIPPED | OUTPATIENT
Start: 2020-05-01 | End: 2020-05-06

## 2020-05-01 RX ORDER — POTASSIUM CHLORIDE 20 MEQ/1
40 TABLET, EXTENDED RELEASE ORAL ONCE
Status: COMPLETED | OUTPATIENT
Start: 2020-05-01 | End: 2020-05-01

## 2020-05-01 RX ADMIN — POTASSIUM CHLORIDE 40 MEQ: 1500 TABLET, EXTENDED RELEASE ORAL at 10:52

## 2020-05-01 RX ADMIN — PYRIDOXINE HCL TAB 50 MG 100 MG: 50 TAB at 10:52

## 2020-05-01 RX ADMIN — Medication 1 TABLET: at 10:52

## 2020-05-03 LAB
BACTERIA UR CULT: ABNORMAL
BACTERIA UR CULT: ABNORMAL

## 2020-05-07 ENCOUNTER — TELEMEDICINE (OUTPATIENT)
Dept: OBGYN CLINIC | Facility: CLINIC | Age: 30
End: 2020-05-07

## 2020-05-07 VITALS — BODY MASS INDEX: 27.1 KG/M2 | WEIGHT: 153 LBS

## 2020-05-07 DIAGNOSIS — Z34.83 ENCOUNTER FOR SUPERVISION OF OTHER NORMAL PREGNANCY, THIRD TRIMESTER: Primary | ICD-10-CM

## 2020-05-07 DIAGNOSIS — O09.292 HISTORY OF MACROSOMIA IN INFANT IN PRIOR PREGNANCY, CURRENTLY PREGNANT IN SECOND TRIMESTER: ICD-10-CM

## 2020-05-07 DIAGNOSIS — J18.9 PNEUMONIA OF LEFT LUNG DUE TO INFECTIOUS ORGANISM, UNSPECIFIED PART OF LUNG: ICD-10-CM

## 2020-05-07 DIAGNOSIS — K90.0 CD (CELIAC DISEASE): ICD-10-CM

## 2020-05-07 PROCEDURE — PNV: Performed by: STUDENT IN AN ORGANIZED HEALTH CARE EDUCATION/TRAINING PROGRAM

## 2020-05-19 ENCOUNTER — ROUTINE PRENATAL (OUTPATIENT)
Dept: OBGYN CLINIC | Facility: CLINIC | Age: 30
End: 2020-05-19

## 2020-05-19 VITALS
BODY MASS INDEX: 28.31 KG/M2 | TEMPERATURE: 97.9 F | WEIGHT: 159.8 LBS | SYSTOLIC BLOOD PRESSURE: 88 MMHG | DIASTOLIC BLOOD PRESSURE: 58 MMHG

## 2020-05-19 DIAGNOSIS — Z34.83 ENCOUNTER FOR SUPERVISION OF OTHER NORMAL PREGNANCY, THIRD TRIMESTER: ICD-10-CM

## 2020-05-19 DIAGNOSIS — O09.293 H/O MACROSOMIA IN INFANT IN PRIOR PREGNANCY, CURRENTLY PREGNANT, THIRD TRIMESTER: ICD-10-CM

## 2020-05-19 DIAGNOSIS — Z34.83 PRENATAL CARE, SUBSEQUENT PREGNANCY, THIRD TRIMESTER: Primary | ICD-10-CM

## 2020-05-19 DIAGNOSIS — O99.119 THROMBOCYTOPENIA AFFECTING PREGNANCY (HCC): ICD-10-CM

## 2020-05-19 DIAGNOSIS — D69.6 THROMBOCYTOPENIA AFFECTING PREGNANCY (HCC): ICD-10-CM

## 2020-05-19 LAB
SL AMB  POCT GLUCOSE, UA: NORMAL
SL AMB POCT URINE PROTEIN: NORMAL

## 2020-05-19 PROCEDURE — PNV: Performed by: OBSTETRICS & GYNECOLOGY

## 2020-05-19 RX ORDER — FERROUS SULFATE 325(65) MG
325 TABLET ORAL
COMMUNITY
End: 2020-10-30 | Stop reason: ALTCHOICE

## 2020-06-05 ENCOUNTER — ROUTINE PRENATAL (OUTPATIENT)
Dept: OBGYN CLINIC | Facility: CLINIC | Age: 30
End: 2020-06-05

## 2020-06-05 VITALS — DIASTOLIC BLOOD PRESSURE: 62 MMHG | WEIGHT: 159.4 LBS | SYSTOLIC BLOOD PRESSURE: 88 MMHG | BODY MASS INDEX: 28.24 KG/M2

## 2020-06-05 DIAGNOSIS — Z34.83 PRENATAL CARE, SUBSEQUENT PREGNANCY, THIRD TRIMESTER: Primary | ICD-10-CM

## 2020-06-05 DIAGNOSIS — O09.293 H/O MACROSOMIA IN INFANT IN PRIOR PREGNANCY, CURRENTLY PREGNANT, THIRD TRIMESTER: ICD-10-CM

## 2020-06-05 LAB
SL AMB  POCT GLUCOSE, UA: NEGATIVE
SL AMB POCT URINE PROTEIN: NEGATIVE

## 2020-06-05 PROCEDURE — PNV: Performed by: STUDENT IN AN ORGANIZED HEALTH CARE EDUCATION/TRAINING PROGRAM

## 2020-06-09 ENCOUNTER — TELEPHONE (OUTPATIENT)
Dept: OBGYN CLINIC | Age: 30
End: 2020-06-09

## 2020-06-09 ENCOUNTER — APPOINTMENT (OUTPATIENT)
Dept: LAB | Age: 30
End: 2020-06-09
Payer: COMMERCIAL

## 2020-06-09 DIAGNOSIS — O99.119 THROMBOCYTOPENIA AFFECTING PREGNANCY (HCC): ICD-10-CM

## 2020-06-09 DIAGNOSIS — D69.6 THROMBOCYTOPENIA AFFECTING PREGNANCY (HCC): ICD-10-CM

## 2020-06-09 LAB
BASOPHILS # BLD AUTO: 0.01 THOUSANDS/ΜL (ref 0–0.1)
BASOPHILS NFR BLD AUTO: 0 % (ref 0–1)
EOSINOPHIL # BLD AUTO: 0.06 THOUSAND/ΜL (ref 0–0.61)
EOSINOPHIL NFR BLD AUTO: 1 % (ref 0–6)
ERYTHROCYTE [DISTWIDTH] IN BLOOD BY AUTOMATED COUNT: 16.8 % (ref 11.6–15.1)
HCT VFR BLD AUTO: 36.9 % (ref 34.8–46.1)
HGB BLD-MCNC: 11.5 G/DL (ref 11.5–15.4)
IMM GRANULOCYTES # BLD AUTO: 0.04 THOUSAND/UL (ref 0–0.2)
IMM GRANULOCYTES NFR BLD AUTO: 1 % (ref 0–2)
LYMPHOCYTES # BLD AUTO: 1.56 THOUSANDS/ΜL (ref 0.6–4.47)
LYMPHOCYTES NFR BLD AUTO: 24 % (ref 14–44)
MCH RBC QN AUTO: 28.1 PG (ref 26.8–34.3)
MCHC RBC AUTO-ENTMCNC: 31.2 G/DL (ref 31.4–37.4)
MCV RBC AUTO: 90 FL (ref 82–98)
MONOCYTES # BLD AUTO: 0.51 THOUSAND/ΜL (ref 0.17–1.22)
MONOCYTES NFR BLD AUTO: 8 % (ref 4–12)
NEUTROPHILS # BLD AUTO: 4.31 THOUSANDS/ΜL (ref 1.85–7.62)
NEUTS SEG NFR BLD AUTO: 66 % (ref 43–75)
NRBC BLD AUTO-RTO: 0 /100 WBCS
PLATELET # BLD AUTO: 140 THOUSANDS/UL (ref 149–390)
PMV BLD AUTO: 12 FL (ref 8.9–12.7)
RBC # BLD AUTO: 4.09 MILLION/UL (ref 3.81–5.12)
WBC # BLD AUTO: 6.49 THOUSAND/UL (ref 4.31–10.16)

## 2020-06-09 PROCEDURE — 36415 COLL VENOUS BLD VENIPUNCTURE: CPT

## 2020-06-09 PROCEDURE — 85025 COMPLETE CBC W/AUTO DIFF WBC: CPT

## 2020-06-16 ENCOUNTER — TELEPHONE (OUTPATIENT)
Dept: PERINATAL CARE | Facility: CLINIC | Age: 30
End: 2020-06-16

## 2020-06-17 ENCOUNTER — ULTRASOUND (OUTPATIENT)
Dept: PERINATAL CARE | Facility: OTHER | Age: 30
End: 2020-06-17
Payer: COMMERCIAL

## 2020-06-17 VITALS
SYSTOLIC BLOOD PRESSURE: 102 MMHG | DIASTOLIC BLOOD PRESSURE: 62 MMHG | BODY MASS INDEX: 28.31 KG/M2 | WEIGHT: 159.8 LBS | HEIGHT: 63 IN | HEART RATE: 73 BPM | TEMPERATURE: 98.4 F

## 2020-06-17 DIAGNOSIS — O09.293 H/O MACROSOMIA IN INFANT IN PRIOR PREGNANCY, CURRENTLY PREGNANT, THIRD TRIMESTER: Primary | ICD-10-CM

## 2020-06-17 DIAGNOSIS — Z3A.36 36 WEEKS GESTATION OF PREGNANCY: ICD-10-CM

## 2020-06-17 PROCEDURE — 1036F TOBACCO NON-USER: CPT | Performed by: OBSTETRICS & GYNECOLOGY

## 2020-06-17 PROCEDURE — 76816 OB US FOLLOW-UP PER FETUS: CPT | Performed by: OBSTETRICS & GYNECOLOGY

## 2020-06-17 PROCEDURE — 99212 OFFICE O/P EST SF 10 MIN: CPT | Performed by: OBSTETRICS & GYNECOLOGY

## 2020-06-17 PROCEDURE — 3008F BODY MASS INDEX DOCD: CPT | Performed by: PEDIATRICS

## 2020-06-18 ENCOUNTER — ROUTINE PRENATAL (OUTPATIENT)
Dept: OBGYN CLINIC | Facility: CLINIC | Age: 30
End: 2020-06-18

## 2020-06-18 VITALS
BODY MASS INDEX: 28.7 KG/M2 | WEIGHT: 162 LBS | DIASTOLIC BLOOD PRESSURE: 56 MMHG | SYSTOLIC BLOOD PRESSURE: 98 MMHG | TEMPERATURE: 98 F

## 2020-06-18 DIAGNOSIS — Z34.83 PRENATAL CARE, SUBSEQUENT PREGNANCY, THIRD TRIMESTER: Primary | ICD-10-CM

## 2020-06-18 DIAGNOSIS — O09.293 H/O MACROSOMIA IN INFANT IN PRIOR PREGNANCY, CURRENTLY PREGNANT, THIRD TRIMESTER: ICD-10-CM

## 2020-06-18 LAB
SL AMB  POCT GLUCOSE, UA: NORMAL
SL AMB POCT URINE PROTEIN: NORMAL

## 2020-06-18 PROCEDURE — 87653 STREP B DNA AMP PROBE: CPT | Performed by: OBSTETRICS & GYNECOLOGY

## 2020-06-18 PROCEDURE — PNV: Performed by: OBSTETRICS & GYNECOLOGY

## 2020-06-20 LAB — GP B STREP DNA SPEC QL NAA+PROBE: NORMAL

## 2020-06-23 ENCOUNTER — ROUTINE PRENATAL (OUTPATIENT)
Dept: OBGYN CLINIC | Facility: CLINIC | Age: 30
End: 2020-06-23

## 2020-06-23 VITALS
TEMPERATURE: 97.6 F | BODY MASS INDEX: 28.7 KG/M2 | SYSTOLIC BLOOD PRESSURE: 110 MMHG | WEIGHT: 162 LBS | DIASTOLIC BLOOD PRESSURE: 62 MMHG

## 2020-06-23 DIAGNOSIS — O36.63X0 FETAL MACROSOMIA IN THIRD TRIMESTER, SINGLE OR UNSPECIFIED FETUS: ICD-10-CM

## 2020-06-23 DIAGNOSIS — Z3A.37 37 WEEKS GESTATION OF PREGNANCY: ICD-10-CM

## 2020-06-23 DIAGNOSIS — Z34.93 ENCOUNTER FOR PREGNANCY RELATED EXAMINATION IN THIRD TRIMESTER: ICD-10-CM

## 2020-06-23 DIAGNOSIS — Z34.83 ENCOUNTER FOR SUPERVISION OF OTHER NORMAL PREGNANCY, THIRD TRIMESTER: Primary | ICD-10-CM

## 2020-06-23 LAB
SL AMB  POCT GLUCOSE, UA: NEGATIVE
SL AMB POCT URINE PROTEIN: NEGATIVE

## 2020-06-23 PROCEDURE — PNV: Performed by: NURSE PRACTITIONER

## 2020-06-29 ENCOUNTER — ROUTINE PRENATAL (OUTPATIENT)
Dept: OBGYN CLINIC | Facility: CLINIC | Age: 30
End: 2020-06-29

## 2020-06-29 ENCOUNTER — TELEPHONE (OUTPATIENT)
Dept: OBGYN CLINIC | Facility: CLINIC | Age: 30
End: 2020-06-29

## 2020-06-29 VITALS — WEIGHT: 164 LBS | DIASTOLIC BLOOD PRESSURE: 60 MMHG | SYSTOLIC BLOOD PRESSURE: 86 MMHG | BODY MASS INDEX: 29.05 KG/M2

## 2020-06-29 DIAGNOSIS — Z34.83 ENCOUNTER FOR SUPERVISION OF OTHER NORMAL PREGNANCY, THIRD TRIMESTER: ICD-10-CM

## 2020-06-29 DIAGNOSIS — Z34.93 ENCOUNTER FOR PREGNANCY RELATED EXAMINATION IN THIRD TRIMESTER: Primary | ICD-10-CM

## 2020-06-29 LAB
SL AMB  POCT GLUCOSE, UA: NORMAL
SL AMB POCT URINE PROTEIN: NORMAL

## 2020-06-29 PROCEDURE — PNV: Performed by: STUDENT IN AN ORGANIZED HEALTH CARE EDUCATION/TRAINING PROGRAM

## 2020-07-02 ENCOUNTER — HOSPITAL ENCOUNTER (INPATIENT)
Facility: HOSPITAL | Age: 30
LOS: 2 days | Discharge: HOME | End: 2020-07-04
Attending: STUDENT IN AN ORGANIZED HEALTH CARE EDUCATION/TRAINING PROGRAM | Admitting: OBSTETRICS & GYNECOLOGY
Payer: COMMERCIAL

## 2020-07-02 ENCOUNTER — TELEPHONE (OUTPATIENT)
Dept: OBGYN CLINIC | Facility: CLINIC | Age: 30
End: 2020-07-02

## 2020-07-02 DIAGNOSIS — Z3A.38 38 WEEKS GESTATION OF PREGNANCY: Primary | ICD-10-CM

## 2020-07-02 PROBLEM — J18.9 PNEUMONIA DUE TO INFECTIOUS ORGANISM: Status: RESOLVED | Noted: 2020-05-07 | Resolved: 2020-07-02

## 2020-07-02 LAB
ABO GROUP BLD: NORMAL
BLD GP AB SCN SERPL QL: NEGATIVE
ERYTHROCYTE [DISTWIDTH] IN BLOOD BY AUTOMATED COUNT: 16.2 % (ref 11.6–15.1)
HCT VFR BLD AUTO: 40.4 % (ref 34.8–46.1)
HGB BLD-MCNC: 13.1 G/DL (ref 11.5–15.4)
MCH RBC QN AUTO: 28.7 PG (ref 26.8–34.3)
MCHC RBC AUTO-ENTMCNC: 32.4 G/DL (ref 31.4–37.4)
MCV RBC AUTO: 88 FL (ref 82–98)
PLATELET # BLD AUTO: 156 THOUSANDS/UL (ref 149–390)
PMV BLD AUTO: 11.9 FL (ref 8.9–12.7)
RBC # BLD AUTO: 4.57 MILLION/UL (ref 3.81–5.12)
RH BLD: POSITIVE
SPECIMEN EXPIRATION DATE: NORMAL
WBC # BLD AUTO: 8.42 THOUSAND/UL (ref 4.31–10.16)

## 2020-07-02 PROCEDURE — G0463 HOSPITAL OUTPT CLINIC VISIT: HCPCS

## 2020-07-02 PROCEDURE — 99214 OFFICE O/P EST MOD 30 MIN: CPT

## 2020-07-02 PROCEDURE — 85027 COMPLETE CBC AUTOMATED: CPT | Performed by: OBSTETRICS & GYNECOLOGY

## 2020-07-02 PROCEDURE — 10907ZC DRAINAGE OF AMNIOTIC FLUID, THERAPEUTIC FROM PRODUCTS OF CONCEPTION, VIA NATURAL OR ARTIFICIAL OPENING: ICD-10-PCS | Performed by: OBSTETRICS & GYNECOLOGY

## 2020-07-02 PROCEDURE — 86850 RBC ANTIBODY SCREEN: CPT | Performed by: OBSTETRICS & GYNECOLOGY

## 2020-07-02 PROCEDURE — 86592 SYPHILIS TEST NON-TREP QUAL: CPT | Performed by: OBSTETRICS & GYNECOLOGY

## 2020-07-02 PROCEDURE — 4A1HXCZ MONITORING OF PRODUCTS OF CONCEPTION, CARDIAC RATE, EXTERNAL APPROACH: ICD-10-PCS | Performed by: OBSTETRICS & GYNECOLOGY

## 2020-07-02 PROCEDURE — 99024 POSTOP FOLLOW-UP VISIT: CPT | Performed by: OBSTETRICS & GYNECOLOGY

## 2020-07-02 PROCEDURE — 86901 BLOOD TYPING SEROLOGIC RH(D): CPT | Performed by: OBSTETRICS & GYNECOLOGY

## 2020-07-02 PROCEDURE — 86900 BLOOD TYPING SEROLOGIC ABO: CPT | Performed by: OBSTETRICS & GYNECOLOGY

## 2020-07-02 RX ORDER — SODIUM CHLORIDE, SODIUM LACTATE, POTASSIUM CHLORIDE, CALCIUM CHLORIDE 600; 310; 30; 20 MG/100ML; MG/100ML; MG/100ML; MG/100ML
125 INJECTION, SOLUTION INTRAVENOUS CONTINUOUS
Status: DISCONTINUED | OUTPATIENT
Start: 2020-07-02 | End: 2020-07-04 | Stop reason: HOSPADM

## 2020-07-02 RX ORDER — BUTORPHANOL TARTRATE 1 MG/ML
1 INJECTION, SOLUTION INTRAMUSCULAR; INTRAVENOUS ONCE
Status: DISCONTINUED | OUTPATIENT
Start: 2020-07-02 | End: 2020-07-03

## 2020-07-02 RX ADMIN — SODIUM CHLORIDE, SODIUM LACTATE, POTASSIUM CHLORIDE, AND CALCIUM CHLORIDE 125 ML/HR: .6; .31; .03; .02 INJECTION, SOLUTION INTRAVENOUS at 20:35

## 2020-07-02 NOTE — PROGRESS NOTES
Triage Note - OB  Jesus Pak 30 y o  female MRN: [id number]  Unit/Bed#: LD TRIAGE 2- Encounter: [id number]    OB Pedro Pablo  [id number]  2020  6:25 PM  LD TRIAGE 2/LD TRIAGE 2-*    ASSESS:  30 y o   38w3d with contractions  PLAN  · SVE 1-/-2, unchanged on 1 hour recheck--> patient now comfortable again, will recheck in another 2 hours  · Disposition per night team    D/w Dr Giovana Fernández   ______________    SUBJECTIVE    ALEX: Estimated Date of Delivery: 20    HPI Chronology:  30 y o   38w3d presents with complaint of more frequent and painful contractions  At home she was feeling them every 3-4 minutes  They spaced out more when she first arrived to triage, but then they picked up again  She denies LOF, VB, and has good fetal movement  Vitals:   BP 98/55 (BP Location: Right arm)   Pulse 80   Temp 98 1 °F (36 7 °C) (Oral)   Resp 16   Ht 5' 3" (1 6 m)   Wt 74 4 kg (164 lb)   LMP 10/07/2019 (Exact Date)   BMI 29 05 kg/m²   Body mass index is 29 05 kg/m²  Review of Systems  See HPI    Physical Exam   Constitutional: She is oriented to person, place, and time  She appears well-developed and well-nourished  HENT:   Head: Normocephalic and atraumatic  Pulmonary/Chest: Effort normal  No respiratory distress  Genitourinary: Vagina normal  No vaginal discharge found  Neurological: She is alert and oriented to person, place, and time  Psychiatric: She has a normal mood and affect  Her behavior is normal        FHT:  Baseline Rate: 125 bpm  Variability: Moderate 6-25 bpm  Accelerations: 15 x 15 or greater, At variable times  Decelerations: None     TOCO:   Contraction Frequency (minutes): 1 5-3  Contraction Duration (seconds):   Contraction Quality: Mild, Moderate    Labs: No results found for this or any previous visit (from the past 24 hour(s))  Lab, Imaging and other studies: I have personally reviewed pertinent reports          Yadira Taylor Brannon Bullard MD  7/2/2020  6:25 PM

## 2020-07-02 NOTE — TELEPHONE ENCOUNTER
Pt called stating she is having cxtns for 90 min now , lasting 1 min, 3-4 min apart     [facility] 2020  TT sent to [facility] on-call,  Dr Castañeda Credit  Per dr Gisell Nuno, pt to report to  [address] [place] on diversion)  Advised pt - Talat called & notified

## 2020-07-03 ENCOUNTER — ANESTHESIA EVENT (INPATIENT)
Dept: ANESTHESIOLOGY | Facility: HOSPITAL | Age: 30
End: 2020-07-03
Payer: COMMERCIAL

## 2020-07-03 ENCOUNTER — ANESTHESIA (INPATIENT)
Dept: ANESTHESIOLOGY | Facility: HOSPITAL | Age: 30
End: 2020-07-03
Payer: COMMERCIAL

## 2020-07-03 LAB
BASE EXCESS BLDCOA CALC-SCNC: -4.5 MMOL/L (ref 3–11)
BASE EXCESS BLDCOV CALC-SCNC: -3.8 MMOL/L (ref 1–9)
HCO3 BLDCOA-SCNC: 23.5 MMOL/L (ref 17.3–27.3)
HCO3 BLDCOV-SCNC: 20.4 MMOL/L (ref 12.2–28.6)
O2 CT VFR BLDCOA CALC: 12.3 ML/DL
OXYHGB MFR BLDCOA: 58.7 %
OXYHGB MFR BLDCOV: 80.1 %
PCO2 BLDCOA: 55.2 MM[HG] (ref 30–60)
PCO2 BLDCOV: 34.5 MM HG (ref 27–43)
PH BLDCOA: 7.25 [PH] (ref 7.23–7.43)
PH BLDCOV: 7.39 [PH] (ref 7.19–7.49)
PO2 BLDCOA: 27 MM HG (ref 5–25)
PO2 BLDCOV: 32.9 MM HG (ref 15–45)
SAO2 % BLDCOV: 15.7 ML/DL

## 2020-07-03 PROCEDURE — NC001 PR NO CHARGE: Performed by: OBSTETRICS & GYNECOLOGY

## 2020-07-03 PROCEDURE — 82805 BLOOD GASES W/O2 SATURATION: CPT | Performed by: STUDENT IN AN ORGANIZED HEALTH CARE EDUCATION/TRAINING PROGRAM

## 2020-07-03 PROCEDURE — 59400 OBSTETRICAL CARE: CPT | Performed by: OBSTETRICS & GYNECOLOGY

## 2020-07-03 RX ORDER — CALCIUM CARBONATE 200(500)MG
1000 TABLET,CHEWABLE ORAL DAILY PRN
Status: DISCONTINUED | OUTPATIENT
Start: 2020-07-03 | End: 2020-07-04 | Stop reason: HOSPADM

## 2020-07-03 RX ORDER — SIMETHICONE 80 MG
80 TABLET,CHEWABLE ORAL 4 TIMES DAILY PRN
Status: DISCONTINUED | OUTPATIENT
Start: 2020-07-03 | End: 2020-07-04 | Stop reason: HOSPADM

## 2020-07-03 RX ORDER — ACETAMINOPHEN 325 MG/1
650 TABLET ORAL EVERY 6 HOURS PRN
Status: DISCONTINUED | OUTPATIENT
Start: 2020-07-03 | End: 2020-07-04 | Stop reason: HOSPADM

## 2020-07-03 RX ORDER — DIAPER,BRIEF,INFANT-TODD,DISP
1 EACH MISCELLANEOUS 4 TIMES DAILY PRN
Status: DISCONTINUED | OUTPATIENT
Start: 2020-07-03 | End: 2020-07-04 | Stop reason: HOSPADM

## 2020-07-03 RX ORDER — OXYTOCIN/RINGER'S LACTATE 30/500 ML
250 PLASTIC BAG, INJECTION (ML) INTRAVENOUS CONTINUOUS
Status: ACTIVE | OUTPATIENT
Start: 2020-07-03 | End: 2020-07-03

## 2020-07-03 RX ORDER — IBUPROFEN 600 MG/1
600 TABLET ORAL EVERY 6 HOURS PRN
Status: DISCONTINUED | OUTPATIENT
Start: 2020-07-03 | End: 2020-07-04 | Stop reason: HOSPADM

## 2020-07-03 RX ORDER — OXYTOCIN/RINGER'S LACTATE 30/500 ML
1-30 PLASTIC BAG, INJECTION (ML) INTRAVENOUS
Status: DISCONTINUED | OUTPATIENT
Start: 2020-07-03 | End: 2020-07-03

## 2020-07-03 RX ORDER — ONDANSETRON 2 MG/ML
4 INJECTION INTRAMUSCULAR; INTRAVENOUS EVERY 8 HOURS PRN
Status: DISCONTINUED | OUTPATIENT
Start: 2020-07-03 | End: 2020-07-04 | Stop reason: HOSPADM

## 2020-07-03 RX ORDER — ROPIVACAINE HYDROCHLORIDE 2 MG/ML
INJECTION, SOLUTION EPIDURAL; INFILTRATION; PERINEURAL AS NEEDED
Status: DISCONTINUED | OUTPATIENT
Start: 2020-07-03 | End: 2020-07-03 | Stop reason: SURG

## 2020-07-03 RX ORDER — DOCUSATE SODIUM 100 MG/1
100 CAPSULE, LIQUID FILLED ORAL 2 TIMES DAILY
Status: DISCONTINUED | OUTPATIENT
Start: 2020-07-03 | End: 2020-07-04 | Stop reason: HOSPADM

## 2020-07-03 RX ORDER — LIDOCAINE HYDROCHLORIDE 10 MG/ML
INJECTION, SOLUTION EPIDURAL; INFILTRATION; INTRACAUDAL; PERINEURAL AS NEEDED
Status: DISCONTINUED | OUTPATIENT
Start: 2020-07-03 | End: 2020-07-03 | Stop reason: SURG

## 2020-07-03 RX ORDER — ROPIVACAINE HYDROCHLORIDE 2 MG/ML
INJECTION, SOLUTION EPIDURAL; INFILTRATION; PERINEURAL CONTINUOUS PRN
Status: DISCONTINUED | OUTPATIENT
Start: 2020-07-03 | End: 2020-07-03 | Stop reason: SURG

## 2020-07-03 RX ORDER — DIPHENHYDRAMINE HCL 25 MG
25 TABLET ORAL EVERY 6 HOURS PRN
Status: DISCONTINUED | OUTPATIENT
Start: 2020-07-03 | End: 2020-07-04 | Stop reason: HOSPADM

## 2020-07-03 RX ORDER — LIDOCAINE HYDROCHLORIDE AND EPINEPHRINE 15; 5 MG/ML; UG/ML
INJECTION, SOLUTION EPIDURAL AS NEEDED
Status: DISCONTINUED | OUTPATIENT
Start: 2020-07-03 | End: 2020-07-03 | Stop reason: SURG

## 2020-07-03 RX ADMIN — Medication 1 TABLET: at 09:37

## 2020-07-03 RX ADMIN — SODIUM CHLORIDE, SODIUM LACTATE, POTASSIUM CHLORIDE, AND CALCIUM CHLORIDE 125 ML/HR: .6; .31; .03; .02 INJECTION, SOLUTION INTRAVENOUS at 01:10

## 2020-07-03 RX ADMIN — SODIUM CHLORIDE, SODIUM LACTATE, POTASSIUM CHLORIDE, AND CALCIUM CHLORIDE 125 ML/HR: .6; .31; .03; .02 INJECTION, SOLUTION INTRAVENOUS at 07:14

## 2020-07-03 RX ADMIN — ROPIVACAINE HYDROCHLORIDE 5 ML: 2 INJECTION, SOLUTION EPIDURAL; INFILTRATION at 07:39

## 2020-07-03 RX ADMIN — Medication 2 MILLI-UNITS/MIN: at 02:45

## 2020-07-03 RX ADMIN — DOCUSATE SODIUM 100 MG: 100 CAPSULE, LIQUID FILLED ORAL at 09:36

## 2020-07-03 RX ADMIN — ROPIVACAINE HYDROCHLORIDE 5 ML: 2 INJECTION, SOLUTION EPIDURAL; INFILTRATION at 05:25

## 2020-07-03 RX ADMIN — LIDOCAINE HYDROCHLORIDE 5 ML: 10 INJECTION, SOLUTION EPIDURAL; INFILTRATION; INTRACAUDAL; PERINEURAL at 07:39

## 2020-07-03 RX ADMIN — ROPIVACAINE HYDROCHLORIDE: 2 INJECTION, SOLUTION EPIDURAL; INFILTRATION at 05:34

## 2020-07-03 RX ADMIN — DOCUSATE SODIUM 100 MG: 100 CAPSULE, LIQUID FILLED ORAL at 17:05

## 2020-07-03 RX ADMIN — Medication 10 ML/HR: at 05:25

## 2020-07-03 RX ADMIN — LIDOCAINE HYDROCHLORIDE AND EPINEPHRINE 3 ML: 15; 5 INJECTION, SOLUTION EPIDURAL at 05:16

## 2020-07-03 NOTE — OB LABOR/OXYTOCIN SAFETY PROGRESS
Oxytocin Safety Progress Check Note - Herbert Rankin 30 y o  female MRN: [id number]    Unit/Bed#: -01 Encounter: [id number]       Contraction Frequency (minutes): 3 5-7  Contraction Quality: Mild  Tachysystole: No   Dilation: 3-4        Effacement (%): 70  Station: -2  Baseline Rate: 125 bpm  Fetal Heart Rate: 124 BPM  FHR Category: Category I             Notes/comments:   Patient feeling like contractions have slowed some, will start pitocin titration     Membranes stripped with exam        Yuliet Tillman MD 7/3/2020 1:32 AM

## 2020-07-03 NOTE — ANESTHESIA PROCEDURE NOTES
Epidural Block    Patient location during procedure: OB  Start time: 7/3/2020 5:16 AM  Reason for block: procedure for pain and at surgeon's request  Staffing  Anesthesiologist: Frances Tracey MD  Resident/CRNA: Tuan Henriquez CRNA  Performed: resident/CRNA   Preanesthetic Checklist  Completed: patient identified, site marked, surgical consent, pre-op evaluation, timeout performed, IV checked, risks and benefits discussed and monitors and equipment checked  Epidural  Patient position: sitting  Prep: ChloraPrep  Patient monitoring: cardiac monitor and frequent blood pressure checks  Approach: midline  Location: lumbar (1-5)  Injection technique: MILA saline  Needle  Needle type: Tuohy   Needle gauge: 18 G  Catheter type: side hole  Catheter size: 20 G  Catheter at skin depth: 11 cm  Test dose: negativenegative aspiration for CSF, negative aspiration for heme and no paresthesia on injection  patient tolerated the procedure well with no immediate complications  Additional Notes  Single skin puncture and needle pass  MILA saline @ 6 cm  CSE spinal dose given with +csf on aspiration before and after dose, spinal needle removed, catheter threaded to 18cm w/o paresthesias  Final position 11 at skin  Aspirated thru catheter neg for heme/csf; td negative

## 2020-07-03 NOTE — OB LABOR/OXYTOCIN SAFETY PROGRESS
Oxytocin Safety Progress Check Note - Bal Albarado 30 y o  female MRN: [id number]    Unit/Bed#: -01 Encounter: [id number]    Dose (flor-units/min) Oxytocin: 12 flor-units/min  Contraction Frequency (minutes): 2-6  Contraction Quality: Moderate  Tachysystole: No   Dilation: 6        Effacement (%): 80  Station: 0  Baseline Rate: 125 bpm  Fetal Heart Rate: 125 BPM  FHR Category: Category I             Notes/comments:   Claudia Sol is feeling her contractions more - anesthesia is going to evaluate her epidural   Good cervical change noted         Nichole Mohs, MD 7/3/2020 7:22 AM

## 2020-07-03 NOTE — ANESTHESIA PREPROCEDURE EVALUATION
Review of Systems/Medical History  Patient summary reviewed  Chart reviewed  No history of anesthetic complications     Cardiovascular  Exercise tolerance (METS): >4,     Pulmonary  Negative pulmonary ROS        GI/Hepatic  Negative GI/hepatic ROS          Negative  ROS        Endo/Other  Negative endo/other ROS      GYN  Currently pregnant ,          Hematology  Anemia ,     Musculoskeletal  Negative musculoskeletal ROS        Neurology  Negative neurology ROS      Psychology   Negative psychology ROS              Physical Exam    Airway    Mallampati score: II  TM Distance: >3 FB  Neck ROM: full     Dental   No notable dental hx     Cardiovascular      Pulmonary      Other Findings        Anesthesia Plan  ASA Score- 2     Anesthesia Type- epidural with ASA Monitors  Additional Monitors:   Airway Plan:     Comment: Patient seen and examined  Risks/benefits of epidural discussed including risk of inadvertent spinal tap, intravascular/thecal threading, PDPH, partial or failed block, and rare emergencies  Patient medical history, labs, and medications reviewed  Anesthetic plan for PCEA reviewed with patient  Patient acknowledges understanding and agrees to proceed with epidural procedure        Plan Factors-  Patient did not smoke on day of surgery  Induction-     Postoperative Plan-     Informed Consent- Anesthetic plan and risks discussed with patient  I personally reviewed this patient with the CRNA  Discussed and agreed on the Anesthesia Plan with the CRNA  Sebastien Ramirez

## 2020-07-03 NOTE — DISCHARGE SUMMARY
Discharge Summary - Augusto Dominguez 30 y o  female MRN: [id number]    Unit/Bed#: -01 Encounter: [id number]    Admission Date: 2020     Discharge Date: 2020    Admitting Diagnosis:   1  Pregnancy at 38w4d  2  Labor    Discharge Diagnosis: same, delivered    Procedures: spontaneous vaginal delivery    Admitting and Delivering Attending: Jarvis Stoner MD  Discharging Attending: Dr Aneesh Ardon Course:     Augusto Dominguez is a 30 y o   at 38w4d wks who was initially admitted for Labor  She received an epidural for anesthesia  She delivered a viable female  on 7/3/20 at [phone]  Weight 8lbs 10oz via spontaneous vaginal delivery  Apgars were 9 (1 min) and 9 (5 min)  Patient tolerated the procedure well and was transferred to recovery in stable condition  Her postpartum course was complicated by post partum hemorrhage    Her postpartum pain was well controlled with oral analgesics  On day of discharge, she was ambulating and able to reasonably perform all ADLs  She was voiding and had appropriate bowel function  Pain was well controlled  She was discharged home on postpartum day #1 without complications  Patient was instructed to follow up with her OB as an outpatient and was given appropriate warnings to call provider if she develops signs of infection or uncontrolled pain  Complications: none apparent    Condition at discharge: good      Discharge Medications:   Prenatal vitamin daily for 6 months or the duration of nursing whichever is longer  Motrin 600 mg orally every 6 hours as needed for pain  Tylenol (over the counter) per bottle directions as needed for pain  Hydrocortisone cream 1% (over the counter) applied 1-2x daily to hemorrhoids as needed  Witch hazel pads for hemorrhoidal discomfort as needed    Discharge instructions: See after visit summary for complete information  Do not place anything (no partner, tampons or douche) in your vagina for 6 weeks    You may walk for exercise for the first 6 weeks then gradually return to your usual activities     Please do not drive for 1 week if you have no stitches and for 2 weeks if you have stitches or underwent a  delivery     You may take baths or shower per your preference     Please look at your bust (breasts) in the mirror daily and call for redness or tenderness or increased warmth     If you have had a  please look at your incision daily as well and call us for increasing redness or steady drainage from the incision     Please call us for temperature > 100 4*F or 38* C, worsening pain or a foul discharge      Disposition: Home     Planned Readmission: No    Tacho Dawson MD  PGY-1 OB/GYN   2020 4:01 PM

## 2020-07-03 NOTE — ANESTHESIA POSTPROCEDURE EVALUATION
Post-Op Assessment Note    CV Status:  Stable  Pain Score: 0    Pain management: adequate     Mental Status:  Alert and awake   Hydration Status:  Euvolemic   PONV Controlled:  Controlled   Airway Patency:  Patent   Post Op Vitals Reviewed: Yes      Staff: Anesthesiologist     Post-op block assessment: catheter intact, no complications and site cleaned        /67 (07/03/20 0917)    Temp      Pulse 58 (07/03/20 0917)   Resp      SpO2

## 2020-07-03 NOTE — H&P
History & Physical - OB/GYN   Noe Estevez 30 y o  female MRN: [id number]  Unit/Bed#: LD TRIAGE  Encounter: [id number]    30 y o   at 38w3d by ultrasound  She is a patient of Dr Jeferson Can    Chief complaint: Contractions    HPI:    30yo  at 38w3d, GBS negative presents with contractions  Patient reports contractions beginning at [phone] today that continued to progress in frequency and strength  She is tolerating contractions  No other complaints at this time  Contractions:  yes  Fetal movement:  yes  Vaginal bleeding:   no  Leaking of fluid:  no      Pregnancy Complications:  Patient Active Problem List   Diagnosis    CD (celiac disease)    Constipation    Nausea    History of celiac disease    Encounter for supervision of other normal pregnancy, third trimester    H/O macrosomia in infant in prior pregnancy, currently pregnant, third trimester    38 weeks gestation of pregnancy    Anemia    Fetal macrosomia in third trimester       PMH:  Past Medical History:   Diagnosis Date    Anemia     Celiac disease     Fibroadenoma of breast, right     resolved        PSH:  Past Surgical History:   Procedure Laterality Date    [place]OPH[place]    WISDOM TOOTH EXTRACTION Bilateral        Social Hx:  Negative x3 tobacco, alcohol, substance use in pregnancy    OB Hx:  OB History    Para Term  AB Living   2 1 1 0 0 1   SAB TAB Ectopic Multiple Live Births   0 0 0 0 1      # Outcome Date GA Lbr Nilo/2nd Weight Sex Delivery Anes PTL Lv   2 Current            1 Term 17 39w3d 04:55 / 00:52 4110 g (9 lb 1 oz) F Vag-Spont EPI N DANY      Birth Comments: IOL      Name: Georgina Laud: 9  Apgar5: 9       Meds:  No current facility-administered medications on file prior to encounter        Current Outpatient Medications on File Prior to Encounter   Medication Sig Dispense Refill    ferrous sulfate 325 (65 Fe) mg tablet Take 325 mg by mouth daily with breakfast      Prenatal Vit-Fe Fumarate-FA (M-VIT PO)       pyridoxine (VITAMIN B6) 100 mg tablet Take 100 mg by mouth daily      UNISOM 25 MG tablet Take by mouth daily at bedtime as needed for sleep         Allergies:  No Known Allergies    Labs:  Blood type: A+  Antibody: negative  Group B strep: negative  HIV: negative  Hepatitis B: negative  RPR: non-reactive  Rubella: Immune  1 hour Glucose: 96    Physical Exam:  BP 98/55 (BP Location: Right arm)   Pulse 80   Temp 98 1 °F (36 7 °C) (Oral)   Resp 16   Ht 5' 3" (1 6 m)   Wt 74 4 kg (164 lb)   LMP 10/07/2019 (Exact Date)   BMI 29 05 kg/m²     Physical Exam   Constitutional: She is oriented to person, place, and time  She appears well-developed and well-nourished  HENT:   Head: Normocephalic and atraumatic  Cardiovascular: Normal rate, regular rhythm and normal heart sounds  Pulmonary/Chest: Effort normal and breath sounds normal    Neurological: She is alert and oriented to person, place, and time  Skin: Skin is warm and dry  Psychiatric: She has a normal mood and affect  Her behavior is normal        Estimated Fetal Weight: 7 lbs 15oz  Presentation: vertex    SVE: 3 / 70% / -2  FHT:  Category I / Moderate 6 - 25 bpm / accelerations present, no decels  [place]: q4 minutes,     Membranes: intact    Assessment:   30 y o   at 38w3d in labor    Plan:   1  Admit to L&D  2  CBC, RPR, type and screen  3  IVF: LR @ 125cc/hr  4  Clear Liquid diet  5  Epidural upon request    Discussed with Dr Idalia Pollock

## 2020-07-03 NOTE — PLAN OF CARE
Problem: Potential for Falls  Goal: Patient will remain free of falls  Description  INTERVENTIONS:  - Assess patient frequently for physical needs  -  Identify cognitive and physical deficits and behaviors that affect risk of falls    -  [place] fall precautions as indicated by assessment   - Educate patient/family on patient safety including physical limitations  - Instruct patient to call for assistance with activity based on assessment  - Modify environment to reduce risk of injury  - Consider OT/PT consult to assist with strengthening/mobility  Outcome: Progressing     Problem: PAIN - ADULT  Goal: Verbalizes/displays adequate comfort level or baseline comfort level  Description  Interventions:  - Encourage patient to monitor pain and request assistance  - Assess pain using appropriate pain scale  - Administer analgesics based on type and severity of pain and evaluate response  - Implement non-pharmacological measures as appropriate and evaluate response  - Consider cultural and social influences on pain and pain management  - Notify physician/advanced practitioner if interventions unsuccessful or patient reports new pain  Outcome: Progressing     Problem: INFECTION - ADULT  Goal: Absence or prevention of progression during hospitalization  Description  INTERVENTIONS:  - Assess and monitor for signs and symptoms of infection  - Monitor lab/diagnostic results  - Monitor all insertion sites, i e  indwelling lines, tubes, and drains  - Monitor endotracheal if appropriate and nasal secretions for changes in amount and color  - [place] appropriate cooling/warming therapies per order  - Administer medications as ordered  - Instruct and encourage patient and family to use good hand hygiene technique  - Identify and instruct in appropriate isolation precautions for identified infection/condition  Outcome: Progressing  Goal: Absence of fever/infection during neutropenic period  Description  INTERVENTIONS:  - Monitor WBC    Outcome: Progressing     Problem: SAFETY ADULT  Goal: Patient will remain free of falls  Description  INTERVENTIONS:  - Assess patient frequently for physical needs  -  Identify cognitive and physical deficits and behaviors that affect risk of falls    -  [place] fall precautions as indicated by assessment   - Educate patient/family on patient safety including physical limitations  - Instruct patient to call for assistance with activity based on assessment  - Modify environment to reduce risk of injury  - Consider OT/PT consult to assist with strengthening/mobility  Outcome: Progressing  Goal: Maintain or return to baseline ADL function  Description  INTERVENTIONS:  -  Assess patient's ability to carry out ADLs; assess patient's baseline for ADL function and identify physical deficits which impact ability to perform ADLs (bathing, care of mouth/teeth, toileting, grooming, dressing, etc )  - Assess/evaluate cause of self-care deficits   - Assess range of motion  - Assess patient's mobility; develop plan if impaired  - Assess patient's need for assistive devices and provide as appropriate  - Encourage maximum independence but intervene and supervise when necessary  - Involve family in performance of ADLs  - Assess for home care needs following discharge   - Consider OT consult to assist with ADL evaluation and planning for discharge  - Provide patient education as appropriate  Outcome: Progressing  Goal: Maintain or return mobility status to optimal level  Description  INTERVENTIONS:  - Assess patient's baseline mobility status (ambulation, transfers, stairs, etc )    - Identify cognitive and physical deficits and behaviors that affect mobility  - Identify mobility aids required to assist with transfers and/or ambulation (gait belt, sit-to-stand, lift, walker, cane, etc )  - [place] fall precautions as indicated by assessment  - Record patient progress and toleration of activity level on Mobility SBAR; progress patient to next Phase/Stage  - Instruct patient to call for assistance with activity based on assessment  - Consider rehabilitation consult to assist with strengthening/weightbearing, etc   Outcome: Progressing     Problem: Knowledge Deficit  Goal: Patient/family/caregiver demonstrates understanding of disease process, treatment plan, medications, and discharge instructions  Description  Complete learning assessment and assess knowledge base    Interventions:  - Provide teaching at level of understanding  - Provide teaching via preferred learning methods  Outcome: Progressing     Problem: DISCHARGE PLANNING  Goal: Discharge to home or other facility with appropriate resources  Description  INTERVENTIONS:  - Identify barriers to discharge w/patient and caregiver  - Arrange for needed discharge resources and transportation as appropriate  - Identify discharge learning needs (meds, wound care, etc )  - Arrange for interpretive services to assist at discharge as needed  - Refer to Case Management Department for coordinating discharge planning if the patient needs post-hospital services based on physician/advanced practitioner order or complex needs related to functional status, cognitive ability, or social support system  Outcome: Progressing     Problem: POSTPARTUM  Goal: Experiences normal postpartum course  Description  INTERVENTIONS:  - Monitor maternal vital signs  - Assess uterine involution and lochia  Outcome: Progressing  Goal: Appropriate maternal -  bonding  Description  INTERVENTIONS:  - Identify family support  - Assess for appropriate maternal/infant bonding   -Encourage maternal/infant bonding opportunities  - Referral to  or  as needed  Outcome: Progressing  Goal: Establishment of infant feeding pattern  Description  INTERVENTIONS:  - Assess breast/bottle feeding  - Refer to lactation as needed  Outcome: Progressing  Goal: Incision(s), wounds(s) or drain site(s) healing without S/S of infection  Description  INTERVENTIONS  - Assess and document risk factors for skin impairment   - Assess and document dressing, incision, wound bed, drain sites and surrounding tissue  - Consider nutrition services referral as needed  - Oral mucous membranes remain intact  - Provide patient/ family education  Outcome: Progressing

## 2020-07-03 NOTE — DISCHARGE INSTRUCTIONS
Vaginal Delivery   WHAT YOU SHOULD KNOW:   A vaginal delivery is the birth of your baby through your vagina (birth canal)  AFTER YOU LEAVE:   Medicines:  · NSAIDs  help decrease swelling and pain or fever  This medicine is available with or without a doctor's order  NSAIDs can cause stomach bleeding or kidney problems in certain people  If you take blood thinner medicine, always ask your healthcare provider if NSAIDs are safe for you  Always read the medicine label and follow directions  · Take your medicine as directed  Call your healthcare provider if you think your medicine is not helping or if you have side effects  Tell him if you are allergic to any medicine  Keep a list of the medicines, vitamins, and herbs you take  Include the amounts, and when and why you take them  Bring the list or the pill bottles to follow-up visits  Carry your medicine list with you in case of an emergency  Follow up with your primary healthcare provider:  Most women need to return 6 weeks after a vaginal delivery  Ask about how to care for your wounds or stitches  Write down your questions so you remember to ask them during your visits  Activity:  Rest as much as possible  Try to keep all activities short  You may be able to do some exercise soon after you have your baby  Talk with your primary healthcare provider before you start exercising  If you work outside the home, ask when you can return to your job  Kegel exercises:  Kegel exercises may help your vaginal and rectal muscles heal faster  You can do Kegel exercises by tightening and relaxing the muscles around your vagina  Kegel exercises help make the muscles stronger  Breast care:  When your milk comes in, your breasts may feel full and hard  Ask how to care for your breasts, even if you are not breastfeeding  Constipation:  Do not try to push the bowel movement out if it is too hard   High-fiber foods, extra liquids, and regular exercise can help you prevent constipation  Examples of high-fiber foods are fruit and bran  Prune juice and water are good liquids to drink  Regular exercise helps your digestive system work  You may also be told to take over-the-counter fiber and stool softener medicines  Take these items as directed  Hemorrhoids:  Pregnancy can cause severe hemorrhoids  You may have rectal pain because of the hemorrhoids  Ask how to prevent or treat hemorrhoids  Perineum care: Your perineum is the area between your vagina and anus  Keep the area clean and dry to help it heal and to prevent infection  Wash the area gently with soap and water when you bathe or shower  Rinse your perineum with warm water when you use the toilet  Your primary healthcare provider may suggest you use a warm sitz bath to help decrease pain  A sitz bath is a bathtub or basin filled to hip level  Stay in the sitz bath for 20 to 30 minutes, or as directed  Vaginal discharge: You will have vaginal discharge, called lochia, after your delivery  The lochia is bright red the first day or two after the birth  By the fourth day, the amount decreases, and it turns red-brown  Use a sanitary pad rather than a tampon to prevent a vaginal infection  It is normal to have lochia up to 8 weeks after your baby is born  Monthly periods: Your period may start again within 7 to 12 weeks after your baby is born  If you are breastfeeding, it may take longer for your period to start again  You can still get pregnant again even though you do not have your monthly period  Talk with your primary healthcare provider about a birth control method that will be good for you if you do not want to get pregnant  Mood changes: Many new mothers have some kind of mood changes after delivery  Some of these changes occur because of lack of sleep, hormone changes, and caring for a new baby  Some mood changes can be more serious, such as postpartum depression   Talk with your primary healthcare provider if you feel unable to care for yourself or your baby  Sexual activity:  You may need to avoid sex for 6 to 7 weeks after you have your baby  You may notice you have a decreased desire for sex, or sex may be painful  You may need to use a vaginal lubricant (gel) to help make sex more comfortable  Contact your primary healthcare provider if:   · You have heavy vaginal bleeding that fills 1 or more sanitary pads in 1 hour  · You have a fever  · Your pain does not go away, or gets worse  · The skin between your vagina and rectum is swollen, warm, or red  · You have swollen, hard, or painful breasts  · You feel very sad or depressed  · You feel more tired than usual      · You have questions or concerns about your condition or care  Seek care immediately or call 911 if:   · You have pus or yellow drainage coming from your vagina or wound  · You are urinating very little, or not at all  · Your arm or leg feels warm, tender, and painful  It may look swollen and red  · You feel lightheaded, have sudden and worsening chest pain, or trouble breathing  You may have more pain when you take deep breaths or cough, or you may cough up blood  © 2014 [address] is for End User's use only and may not be sold, redistributed or otherwise used for commercial purposes  All illustrations and images included in CareNotes® are the copyrighted property of [employer]  or Harjinder Sweeney  The above information is an  only  It is not intended as medical advice for individual conditions or treatments  Talk to your doctor, nurse or pharmacist before following any medical regimen to see if it is safe and effective for you

## 2020-07-03 NOTE — L&D DELIVERY NOTE
DELIVERY NOTE  Diana Mueller 30 y o  female MRN: [id number]  Unit/Bed#:  205 Encounter: [id number]    Obstetrician:   Darrell    Assistant: None    Pre-Delivery Diagnosis: Term pregnancy  Spontaneous labor  Single fetus    Post-Delivery Diagnosis: Same as above - Delivered  Viable female fetus, Apgars 9/9 "Amena Roca"  Weight pending    Procedure: Spontaneous vaginal delivery    Quantified Blood Loss:             Anesthesia: epidural    Complications:  None    Specimens: cord blood, arterial and venous cord gases, placenta to storage    Description of Delivery:     Patient had an effective second stage, pushed for 7 minutes  Patient delivered a viable Female  over intact perineum  A nuchal cord was not noted  With the assistance of maternal expulsive efforts and downward traction of fetal head, the anterior shoulder was delivered without difficulty, followed by the remainder of the infant's body  The infant was then placed on the mothers abdomen  A nurse resuscitator was present at bedside to assess the   The umbilical cord was then doubly clamped and cut  Umbilical cord blood and umbilical artery and venous gases were collected  Active management of the third stage of labor was undertaken using IV pitocin and massage  Placenta was delivered with fundal massage and gentle traction on the cord  A portion of cord was kept for storage for future tissue study if required  Placenta delivered intact with a 3-vessel cord  Bleeding was noted to be under control  Inspection of the vagina, cervix, perineum and rectum was performed  No lacerations were identified  Mother and baby are currently recovering nicely in stable condition             I was present and participated in key portions of the entire procedure    Recent Results (from the past 1 hour(s))   Blood gas, arterial, cord    Collection Time: 20  8:16 AM   Result Value Ref Range    pH, Cord Art 7 247 7 230 - 7 430    pCO2, Cord Art 55 2 30 0 - 60 0    pO2, Cord Art 27 0 (H) 5 0 - 25 0 mm HG    HCO3, Cord Art 23 5 17 3 - 27 3 mmol/L    Base Exc, Cord Art -4 5 (L) 3 0 - 11 0 mmol/L    O2 Content, Cord Art 12 3 ml/dl    O2 Hgb, Arterial Cord 58 7 %   Blood gas, venous, cord    Collection Time: 07/03/20  8:16 AM   Result Value Ref Range    pH, Cord [place] 7 389 7 190 - 7 490    pCO2, Cord Grant 34 5 27 0 - 43 0 mm HG    pO2, Cord Grant 32 9 15 0 - 45 0 mm HG    HCO3, Cord [place] 20 4 12 2 - 28 6 mmol/L    Base Exc, Cord [place] -3 8 (L) 1 0 - 9 0 mmol/L    O2 Cont, Cord Grant 15 7 mL/dL    O2 HGB,VENOUS CORD 80 1 %

## 2020-07-04 VITALS
HEIGHT: 63 IN | HEART RATE: 64 BPM | WEIGHT: 164 LBS | RESPIRATION RATE: 18 BRPM | SYSTOLIC BLOOD PRESSURE: 101 MMHG | DIASTOLIC BLOOD PRESSURE: 62 MMHG | BODY MASS INDEX: 29.06 KG/M2 | OXYGEN SATURATION: 97 % | TEMPERATURE: 97.8 F

## 2020-07-04 PROCEDURE — NC001 PR NO CHARGE: Performed by: OBSTETRICS & GYNECOLOGY

## 2020-07-04 RX ORDER — ACETAMINOPHEN 325 MG/1
650 TABLET ORAL EVERY 6 HOURS PRN
Qty: 30 TABLET | Refills: 0
Start: 2020-07-04 | End: 2020-10-30 | Stop reason: ALTCHOICE

## 2020-07-04 RX ORDER — IBUPROFEN 600 MG/1
600 TABLET ORAL EVERY 6 HOURS PRN
Qty: 30 TABLET | Refills: 0
Start: 2020-07-04 | End: 2020-10-30 | Stop reason: ALTCHOICE

## 2020-07-04 RX ADMIN — IBUPROFEN 600 MG: 600 TABLET ORAL at 00:32

## 2020-07-04 RX ADMIN — DOCUSATE SODIUM 100 MG: 100 CAPSULE, LIQUID FILLED ORAL at 11:11

## 2020-07-04 RX ADMIN — Medication 1 TABLET: at 11:11

## 2020-07-04 NOTE — LACTATION NOTE
This note was copied from a baby's chart  Mom states infant is feeding well so far  Reviewed expected changes in infant feeding patterns over the next few days, engorgement relief measures, sign of milk transfer, use of feeding log and when and where to call for additional assistance as needed  Given discharge breastfeeding pkat and same reviewed

## 2020-07-04 NOTE — PROGRESS NOTES
Progress Note - OB/GYN   Jesus Pak 30 y o  female MRN: [id number]  Unit/Bed#: -01 Encounter: [id number]    Assessment:  Post partum day #2 s/p , stable, and doing well  Post partum hemorrhage    Plan:    Post partum hemorrhage   - Follow up 6am CBC    Continue routine post partum care   - Encourage ambulation   - Encourage breastfeeding    Continue current meds    - See list below   - Pain well controlled    Disposition   - Anticipate discharge home tomorrow      Subjective/Objective       Subjective:   Pain: yes  Tolerating Oral Intake: yes  Voiding: yes  Flatus: yes  Bowel Movement: no  Ambulating: yes  Breastfeeding: Breastfeeding  Chest Pain: no  Shortness of Breath: no  Leg Pain/Discomfort: no    Objective:   Vitals:   BP 99/53 (BP Location: Right arm)   Pulse (!) 54   Temp 98 4 °F (36 9 °C) (Oral)   Resp 18   Ht 5' 3" (1 6 m)   Wt 74 4 kg (164 lb)   LMP 10/07/2019 (Exact Date)   SpO2 97%   Breastfeeding Yes   BMI 29 05 kg/m²   Body mass index is 29 05 kg/m²    I/O       / 0701 - / 0700 / 07 -  0700    Urine (mL/kg/hr)  750 (0 4)    Blood  100    Total Output  850    Net  -850          Unmeasured Urine Occurrence 3 x         Lab Results   Component Value Date    WBC 8 42 2020    HGB 13 1 2020    HCT 40 4 2020    MCV 88 2020     2020       Meds/Allergies   Current Facility-Administered Medications   Medication Dose Route Frequency    acetaminophen (TYLENOL) tablet 650 mg  650 mg Oral Q6H PRN    benzocaine-menthol-lanolin-aloe (DERMOPLAST) 20-0 5 % topical spray   Topical 4x Daily PRN    calcium carbonate (TUMS) chewable tablet 1,000 mg  1,000 mg Oral Daily PRN    diphenhydrAMINE (BENADRYL) tablet 25 mg  25 mg Oral Q6H PRN    docusate sodium (COLACE) capsule 100 mg  100 mg Oral BID    hydrocortisone 1 % cream 1 application  1 application Topical 4x Daily PRN    ibuprofen (MOTRIN) tablet 600 mg  600 mg Oral Q6H PRN    lactated ringers infusion  125 mL/hr Intravenous Continuous    ondansetron (ZOFRAN) injection 4 mg  4 mg Intravenous Q8H PRN    prenatal multivitamin tablet 1 tablet  1 tablet Oral Daily    ropivacaine 0 2% PCEA   Epidural Continuous    simethicone (MYLICON) chewable tablet 80 mg  80 mg Oral 4x Daily PRN    witch hazel-glycerin (TUCKS) topical pad 1 pad  1 pad Topical Q2H PRN       Physical Exam:  General: in no apparent distress  Cardiovascular: Cor RRR  Lungs: clear to auscultation bilaterally  Fundus: Firm and non-tender, 1 cm below the umbilicus    Unique Demarco MD  PGY-1 OB/GYN   7/4/2020 6:03 AM

## 2020-07-04 NOTE — PLAN OF CARE
Problem: Potential for Falls  Goal: Patient will remain free of falls  Description  INTERVENTIONS:  - Assess patient frequently for physical needs  -  Identify cognitive and physical deficits and behaviors that affect risk of falls    -  [place] fall precautions as indicated by assessment   - Educate patient/family on patient safety including physical limitations  - Instruct patient to call for assistance with activity based on assessment  - Modify environment to reduce risk of injury  - Consider OT/PT consult to assist with strengthening/mobility  Outcome: Progressing     Problem: PAIN - ADULT  Goal: Verbalizes/displays adequate comfort level or baseline comfort level  Description  Interventions:  - Encourage patient to monitor pain and request assistance  - Assess pain using appropriate pain scale  - Administer analgesics based on type and severity of pain and evaluate response  - Implement non-pharmacological measures as appropriate and evaluate response  - Consider cultural and social influences on pain and pain management  - Notify physician/advanced practitioner if interventions unsuccessful or patient reports new pain  Outcome: Progressing     Problem: INFECTION - ADULT  Goal: Absence or prevention of progression during hospitalization  Description  INTERVENTIONS:  - Assess and monitor for signs and symptoms of infection  - Monitor lab/diagnostic results  - Monitor all insertion sites, i e  indwelling lines, tubes, and drains  - Monitor endotracheal if appropriate and nasal secretions for changes in amount and color  - [place] appropriate cooling/warming therapies per order  - Administer medications as ordered  - Instruct and encourage patient and family to use good hand hygiene technique  - Identify and instruct in appropriate isolation precautions for identified infection/condition  Outcome: Progressing  Goal: Absence of fever/infection during neutropenic period  Description  INTERVENTIONS:  - Monitor WBC    Outcome: Progressing     Problem: SAFETY ADULT  Goal: Patient will remain free of falls  Description  INTERVENTIONS:  - Assess patient frequently for physical needs  -  Identify cognitive and physical deficits and behaviors that affect risk of falls    -  [place] fall precautions as indicated by assessment   - Educate patient/family on patient safety including physical limitations  - Instruct patient to call for assistance with activity based on assessment  - Modify environment to reduce risk of injury  - Consider OT/PT consult to assist with strengthening/mobility  Outcome: Progressing  Goal: Maintain or return to baseline ADL function  Description  INTERVENTIONS:  -  Assess patient's ability to carry out ADLs; assess patient's baseline for ADL function and identify physical deficits which impact ability to perform ADLs (bathing, care of mouth/teeth, toileting, grooming, dressing, etc )  - Assess/evaluate cause of self-care deficits   - Assess range of motion  - Assess patient's mobility; develop plan if impaired  - Assess patient's need for assistive devices and provide as appropriate  - Encourage maximum independence but intervene and supervise when necessary  - Involve family in performance of ADLs  - Assess for home care needs following discharge   - Consider OT consult to assist with ADL evaluation and planning for discharge  - Provide patient education as appropriate  Outcome: Progressing  Goal: Maintain or return mobility status to optimal level  Description  INTERVENTIONS:  - Assess patient's baseline mobility status (ambulation, transfers, stairs, etc )    - Identify cognitive and physical deficits and behaviors that affect mobility  - Identify mobility aids required to assist with transfers and/or ambulation (gait belt, sit-to-stand, lift, walker, cane, etc )  - [place] fall precautions as indicated by assessment  - Record patient progress and toleration of activity level on Mobility SBAR; progress patient to next Phase/Stage  - Instruct patient to call for assistance with activity based on assessment  - Consider rehabilitation consult to assist with strengthening/weightbearing, etc   Outcome: Progressing     Problem: Knowledge Deficit  Goal: Patient/family/caregiver demonstrates understanding of disease process, treatment plan, medications, and discharge instructions  Description  Complete learning assessment and assess knowledge base    Interventions:  - Provide teaching at level of understanding  - Provide teaching via preferred learning methods  Outcome: Progressing     Problem: DISCHARGE PLANNING  Goal: Discharge to home or other facility with appropriate resources  Description  INTERVENTIONS:  - Identify barriers to discharge w/patient and caregiver  - Arrange for needed discharge resources and transportation as appropriate  - Identify discharge learning needs (meds, wound care, etc )  - Arrange for interpretive services to assist at discharge as needed  - Refer to Case Management Department for coordinating discharge planning if the patient needs post-hospital services based on physician/advanced practitioner order or complex needs related to functional status, cognitive ability, or social support system  Outcome: Progressing     Problem: POSTPARTUM  Goal: Experiences normal postpartum course  Description  INTERVENTIONS:  - Monitor maternal vital signs  - Assess uterine involution and lochia  Outcome: Progressing  Goal: Appropriate maternal -  bonding  Description  INTERVENTIONS:  - Identify family support  - Assess for appropriate maternal/infant bonding   -Encourage maternal/infant bonding opportunities  - Referral to  or  as needed  Outcome: Progressing  Goal: Establishment of infant feeding pattern  Description  INTERVENTIONS:  - Assess breast/bottle feeding  - Refer to lactation as needed  Outcome: Progressing  Goal: Incision(s), wounds(s) or drain site(s) healing without S/S of infection  Description  INTERVENTIONS  - Assess and document risk factors for skin impairment   - Assess and document dressing, incision, wound bed, drain sites and surrounding tissue  - Consider nutrition services referral as needed  - Oral mucous membranes remain intact  - Provide patient/ family education  Outcome: Progressing

## 2020-07-04 NOTE — PLAN OF CARE
Problem: Potential for Falls  Goal: Patient will remain free of falls  Description  INTERVENTIONS:  - Assess patient frequently for physical needs  -  Identify cognitive and physical deficits and behaviors that affect risk of falls    -  [place] fall precautions as indicated by assessment   - Educate patient/family on patient safety including physical limitations  - Instruct patient to call for assistance with activity based on assessment  - Modify environment to reduce risk of injury  - Consider OT/PT consult to assist with strengthening/mobility  7/4/2020 0355 by Edyta Teague RN  Outcome: Progressing  7/4/2020 0355 by Edyta Teague RN  Outcome: Progressing     Problem: PAIN - ADULT  Goal: Verbalizes/displays adequate comfort level or baseline comfort level  Description  Interventions:  - Encourage patient to monitor pain and request assistance  - Assess pain using appropriate pain scale  - Administer analgesics based on type and severity of pain and evaluate response  - Implement non-pharmacological measures as appropriate and evaluate response  - Consider cultural and social influences on pain and pain management  - Notify physician/advanced practitioner if interventions unsuccessful or patient reports new pain  7/4/2020 0355 by Edyta Teague RN  Outcome: Progressing  7/4/2020 0355 by Edyta Teague RN  Outcome: Progressing     Problem: INFECTION - ADULT  Goal: Absence or prevention of progression during hospitalization  Description  INTERVENTIONS:  - Assess and monitor for signs and symptoms of infection  - Monitor lab/diagnostic results  - Monitor all insertion sites, i e  indwelling lines, tubes, and drains  - Monitor endotracheal if appropriate and nasal secretions for changes in amount and color  - [place] appropriate cooling/warming therapies per order  - Administer medications as ordered  - Instruct and encourage patient and family to use good hand hygiene technique  - Identify and instruct in appropriate isolation precautions for identified infection/condition  7/4/2020 0355 by Toby Joseph RN  Outcome: Progressing  7/4/2020 0355 by Toby Joseph RN  Outcome: Progressing  Goal: Absence of fever/infection during neutropenic period  Description  INTERVENTIONS:  - Monitor WBC    7/4/2020 0355 by Toby Joseph RN  Outcome: Progressing  7/4/2020 0355 by Toby Joseph RN  Outcome: Progressing     Problem: SAFETY ADULT  Goal: Patient will remain free of falls  Description  INTERVENTIONS:  - Assess patient frequently for physical needs  -  Identify cognitive and physical deficits and behaviors that affect risk of falls    -  [place] fall precautions as indicated by assessment   - Educate patient/family on patient safety including physical limitations  - Instruct patient to call for assistance with activity based on assessment  - Modify environment to reduce risk of injury  - Consider OT/PT consult to assist with strengthening/mobility  7/4/2020 0355 by Toby Joseph RN  Outcome: Progressing  7/4/2020 0355 by Toby Joseph RN  Outcome: Progressing  Goal: Maintain or return to baseline ADL function  Description  INTERVENTIONS:  -  Assess patient's ability to carry out ADLs; assess patient's baseline for ADL function and identify physical deficits which impact ability to perform ADLs (bathing, care of mouth/teeth, toileting, grooming, dressing, etc )  - Assess/evaluate cause of self-care deficits   - Assess range of motion  - Assess patient's mobility; develop plan if impaired  - Assess patient's need for assistive devices and provide as appropriate  - Encourage maximum independence but intervene and supervise when necessary  - Involve family in performance of ADLs  - Assess for home care needs following discharge   - Consider OT consult to assist with ADL evaluation and planning for discharge  - Provide patient education as appropriate  7/4/2020 0355 by Toby Joseph RN  Outcome: Progressing  7/4/2020 0355 by Mario Barker RN  Outcome: Progressing  Goal: Maintain or return mobility status to optimal level  Description  INTERVENTIONS:  - Assess patient's baseline mobility status (ambulation, transfers, stairs, etc )    - Identify cognitive and physical deficits and behaviors that affect mobility  - Identify mobility aids required to assist with transfers and/or ambulation (gait belt, sit-to-stand, lift, walker, cane, etc )  - [place] fall precautions as indicated by assessment  - Record patient progress and toleration of activity level on Mobility SBAR; progress patient to next Phase/Stage  - Instruct patient to call for assistance with activity based on assessment  - Consider rehabilitation consult to assist with strengthening/weightbearing, etc   7/4/2020 0355 by Mario Barker RN  Outcome: Progressing  7/4/2020 0355 by Mario Barker RN  Outcome: Progressing     Problem: Knowledge Deficit  Goal: Patient/family/caregiver demonstrates understanding of disease process, treatment plan, medications, and discharge instructions  Description  Complete learning assessment and assess knowledge base    Interventions:  - Provide teaching at level of understanding  - Provide teaching via preferred learning methods  7/4/2020 0355 by Mario Barker RN  Outcome: Umesh Greenfield  7/4/2020 0355 by Mario Barker RN  Outcome: Progressing     Problem: DISCHARGE PLANNING  Goal: Discharge to home or other facility with appropriate resources  Description  INTERVENTIONS:  - Identify barriers to discharge w/patient and caregiver  - Arrange for needed discharge resources and transportation as appropriate  - Identify discharge learning needs (meds, wound care, etc )  - Arrange for interpretive services to assist at discharge as needed  - Refer to Case Management Department for coordinating discharge planning if the patient needs post-hospital services based on physician/advanced practitioner order or complex needs related to functional status, cognitive ability, or social support system  2020 by Anastasia Fletcher RN  Outcome: Haseeb Caraballo  2020 by Anastasia Fletcher RN  Outcome: Progressing     Problem: POSTPARTUM  Goal: Experiences normal postpartum course  Description  INTERVENTIONS:  - Monitor maternal vital signs  - Assess uterine involution and lochia  2020 by Anastasia Fletcher RN  Outcome: Haseeb Caraballo  2020 by Anastasia Fletcher RN  Outcome: Progressing  Goal: Appropriate maternal -  bonding  Description  INTERVENTIONS:  - Identify family support  - Assess for appropriate maternal/infant bonding   -Encourage maternal/infant bonding opportunities  - Referral to  or  as needed  2020 [phone] by Anastasia Fletcher RN  Outcome: Progressing  2020 by Anastasia Fletcher RN  Outcome: Progressing  Goal: Establishment of infant feeding pattern  Description  INTERVENTIONS:  - Assess breast/bottle feeding  - Refer to lactation as needed  2020 by Anastasia Fletcher RN  Outcome: Progressing  2020 by Anastasia Fletcher RN  Outcome: Progressing  Goal: Incision(s), wounds(s) or drain site(s) healing without S/S of infection  Description  INTERVENTIONS  - Assess and document risk factors for skin impairment   - Assess and document dressing, incision, wound bed, drain sites and surrounding tissue  - Consider nutrition services referral as needed  - Oral mucous membranes remain intact  - Provide patient/ family education  2020 by Anastasia Fletcher RN  Outcome: Progressing  2020 by Anastasia Fletcher RN  Outcome: Progressing

## 2020-07-06 ENCOUNTER — TRANSITIONAL CARE MANAGEMENT (OUTPATIENT)
Dept: INTERNAL MEDICINE CLINIC | Facility: CLINIC | Age: 30
End: 2020-07-06

## 2020-07-06 LAB — RPR SER QL: NORMAL

## 2020-07-06 PROCEDURE — TCMXX

## 2020-07-06 NOTE — UTILIZATION REVIEW
Notification of Maternity/Delivery & [place] Birth Information for Admission   Notification of Maternity/Delivery for Admission to our facility [address]  Be advised that this patient was admitted to our facility under Inpatient Status  Contact Zandra Anand at [phone] for additional admission information  [facility] PARENT/CHILD HEALTH  DEPT DEDICATED Lesly Meehan [phone]  Mother &  Information   Patient Name: Risa De Oliveira   YOB: 1990   Delivering clinician: Chris   OB History        2    Para   2    Term   2       0    AB   0    Living   2       SAB   0    TAB   0    Ectopic   0    Multiple   0    Live Births   2                Name & MRN:   Information for the patient's :  Yasmine Victoria [[id number]]     [place] Delivery Information:  Sex: female  Delivered 7/3/2020 8:16 AM by Vaginal, Spontaneous; Gestational Age: 38w3d     Measurements:  Weight: 8 lb 10 oz (3912 g); Height: 19 5"    APGAR 1 minute 5 minutes 10 minutes   Totals: 9 9       Birth Information: 35 y o  female MRN: [id number] Unit/Bed#: -01 Estimated Date of Delivery: 20  Birthweight: No birth weight on file  Gestational Age: <None> Delivery Type: Vaginal, Spontaneous      Authorization Information   Servicing Facility:   [facility] [address]  Tax ID: [id number]  NPI: [id number] Attending Provider/NPI:  Phone:  Address: Angy Doss [[id number]]  [phone]  Same as Facility   Place of Service Code:  24 Place of Service Name: [address] [place] [address]   Start Date: 20   Discharge Date & Time: 2020  2:35 PM    Type of Admission: Inpatient Status Discharge Disposition (if discharged): Home/Self Care   Patient Diagnoses:   38 weeks gestation of pregnancy [Z3A 38]  The primary encounter diagnosis was 38 weeks gestation of pregnancy   A diagnosis of Vaginal delivery was also pertinent to this visit  1  38 weeks gestation of pregnancy    2  Vaginal delivery       Orders: Admission Orders (From admission, onward)     Ordered        07/02/20 2018  Inpatient Admission  Once                    Assigned Utilization Review Contact: Doreen Boyer  Utilization   Network Utilization Review Department  Phone: [phone]; Fax [phone]  Email: [email or web address]

## 2020-07-10 LAB — PLACENTA IN STORAGE: NORMAL

## 2020-07-22 ENCOUNTER — POSTPARTUM VISIT (OUTPATIENT)
Dept: OBGYN CLINIC | Facility: CLINIC | Age: 30
End: 2020-07-22

## 2020-07-22 VITALS
DIASTOLIC BLOOD PRESSURE: 60 MMHG | SYSTOLIC BLOOD PRESSURE: 102 MMHG | BODY MASS INDEX: 25.33 KG/M2 | WEIGHT: 143 LBS | TEMPERATURE: 97.6 F

## 2020-07-22 PROBLEM — Z3A.38 38 WEEKS GESTATION OF PREGNANCY: Status: RESOLVED | Noted: 2020-04-30 | Resolved: 2020-07-22

## 2020-07-22 PROBLEM — O09.293 H/O MACROSOMIA IN INFANT IN PRIOR PREGNANCY, CURRENTLY PREGNANT, THIRD TRIMESTER: Status: RESOLVED | Noted: 2020-02-28 | Resolved: 2020-07-22

## 2020-07-22 PROBLEM — O36.63X0 FETAL MACROSOMIA IN THIRD TRIMESTER: Status: RESOLVED | Noted: 2020-06-23 | Resolved: 2020-07-22

## 2020-07-22 PROBLEM — Z34.83 ENCOUNTER FOR SUPERVISION OF OTHER NORMAL PREGNANCY, THIRD TRIMESTER: Status: RESOLVED | Noted: 2020-02-28 | Resolved: 2020-07-22

## 2020-07-22 PROCEDURE — PNV: Performed by: STUDENT IN AN ORGANIZED HEALTH CARE EDUCATION/TRAINING PROGRAM

## 2020-07-22 NOTE — PROGRESS NOTES
Kristina Lee  1990    S:  35 y o  female here for postpartum visit  She is s/p Uncomplicated vaginal delivery on 7/3/2020   Gender: female   Apgars: 9/9  Weight: 8#10oz  Her lochia has almost resolved  She is Breastfeeding with some difficulty latching  Baby has a lip tie and [facility]  She is eating normally, denies constipation, diarrhea, urinary dysfunction  She denies postpartum blues/depression  Her EPDS is 4  Last Pap: NILM/HPV neg 1/2/2020    We discussed contraceptive options and she would like to use condoms at this time  Her  is planning a vasectomy and, should he not go through with this, she would likely use the [facility] again  O:   /60 (BP Location: Left arm, Patient Position: Sitting, Cuff Size: Standard)   Temp 97 6 °F (36 4 °C)   Wt 64 9 kg (143 lb)   LMP 10/07/2019 (Exact Date)   BMI 25 33 kg/m²   She appears well and in no distress  Abdomen is soft and nontender  External genitals are normal  Vagina is normal  Cervix, uterus and adnexa are nontender, no masses palpable  A/P:  Postpartum visit  She will return in 3-4 months for her yearly exam, sooner PRN

## 2020-07-23 ENCOUNTER — OFFICE VISIT (OUTPATIENT)
Dept: POSTPARTUM | Facility: CLINIC | Age: 30
End: 2020-07-23
Payer: COMMERCIAL

## 2020-07-23 VITALS — DIASTOLIC BLOOD PRESSURE: 60 MMHG | SYSTOLIC BLOOD PRESSURE: 90 MMHG

## 2020-07-23 DIAGNOSIS — Z71.89 ENCOUNTER FOR BREAST FEEDING COUNSELING: Primary | ICD-10-CM

## 2020-07-23 PROCEDURE — 99404 PREV MED CNSL INDIV APPRX 60: CPT | Performed by: PEDIATRICS

## 2020-07-23 NOTE — PROGRESS NOTES
INITIAL BREAST FEEDING EVALUATION    Informant/Relationship: Deandra Méndez    Discussion of General Lactation Issues: Abiola clicks frequently while feeding  At times she also has trouble maintaining latch  She is gassy often as well  Elizabetmarisela Méndez feels that Joyce Brown has a lip tie  Infant is 3weeks old today          History:  Fertility Problem:no  Breast changes:no  : yes - labor augmented with pitocin  Full term:yes - 38 4/7 weeks   labor:no  First nursing/attempt < 1 hour after birth:yes - baby latched in the delivery room  Skin to skin following delivery:yes - until after the first feeding  Breast changes after delivery:yes - breasts were full by day 2-3  Rooming in (infant in room with mother with exception of procedures, eg  Circumcision: yes - did not leave mother's room  Blood sugar issues:no  NICU stay:no  Jaundice:no  Phototherapy:no  Supplement given: (list supplement and method used as well as reason(s):no    Past Medical History:   Diagnosis Date    Anemia     Celiac disease 2007    Fibroadenoma of breast, right     resolved          Current Outpatient Medications:     acetaminophen (TYLENOL) 325 mg tablet, Take 2 tablets (650 mg total) by mouth every 6 (six) hours as needed for headaches, Disp: 30 tablet, Rfl: 0    ferrous sulfate 325 (65 Fe) mg tablet, Take 325 mg by mouth daily with breakfast, Disp: , Rfl:     ibuprofen (MOTRIN) 600 mg tablet, Take 1 tablet (600 mg total) by mouth every 6 (six) hours as needed for mild pain or moderate pain, Disp: 30 tablet, Rfl: 0    Prenatal Vit-Fe Fumarate-FA (M-VIT PO), , Disp: , Rfl:     No Known Allergies    Social History     Substance and Sexual Activity   Drug Use No       Social History Never a smoker    Interval Breastfeeding History:    Frequency of breast feeding: Every 2-3 hours on demand  Does mother feel breastfeeding is effective: Yes  Does infant appear satisfied after nursing:Yes  Stooling pattern normal: Yes  Urinating frequently:Yes  Using shield or shells: No    Alternative/Artificial Feedings:   Bottle: Yes, attempted for the first time yesterday  Cup: No  Syringe/Finger: No           Formula Type: none                     Amount: n/a            Breast Milk:                      Amount: 1 5 ounces            Frequency Q 2-3 Hr between feedings  Elimination Problems: No      Equipment:  Nipple Shield             Type: none             Size: n/a             Frequency of Use: n/a  Pump            Type: Christin            Frequency of Use: 2-3 times a day after feeding  Expresses about 3-4 ounces per session  Shells            Type: none            Frequency of use: n/a    Equipment Problems: no    Mom:  Breast: Medium sized symmetrical breasts  Rounded shape  Appropriately spaced  Mildly engorged accessory breast tissue in the right axilla  Improving per Seven Johnson  Nipple Assessment in General: Normal: elongated/eraser, no discoloration and no damage noted  Mother's Awareness of Feeding Cues                 Recognizes: Yes                  Verbalizes: Yes  Support System: FOB, extended family  History of Breastfeeding:  older child for 5-6 months  Stopped due to concerns with milk and soy allergy and Seven Johnson felt diet for her was too restrictive  Changes/Stressors/Violence: Seven Johnson is concerned that Hanna clicks when feeding (both breast and bottle)  Concerns/Goals: Seven Alex would like to breastfeed for at least a year  Problems with Mom: None    Physical Exam   Constitutional: She is oriented to person, place, and time  She appears well-developed and well-nourished  HENT:   Head: Normocephalic and atraumatic  Neck: Normal range of motion  Neck supple  Cardiovascular: Normal rate, regular rhythm and normal heart sounds  Pulmonary/Chest: Effort normal and breath sounds normal    Musculoskeletal: Normal range of motion  She exhibits no edema  Neurological: She is alert and oriented to person, place, and time  Skin: Skin is warm and dry  Psychiatric: She has a normal mood and affect  Her behavior is normal  Judgment and thought content normal        Infant:  Behaviors: Alert  Color: Pink  Birth weight: 3912gram  Current weight: 4105gram    Problems with infant: Clicks when feeding  At times struggles to maintain latch  Frequently gassy  General Appearance:  Alert, active, no distress                            Head:  Normocephalic, AFOF, sutures opposed                            Eyes:   Conjunctiva clear, no drainage                            Ears:   Normally placed, no anomolies                           Nose:   no drainage or erythema                          Mouth:  No lesions  Tongue extends beyond the lower lip, lateralizes well and tip elevates  Good cupping of my finger while sucking with peristalsis of the entire tongue but able to pull my finger out of her mouth relatively easily as not much suction was generated  Labial frenulum is long and fairly elastic  Able to flange upper lip without difficulty  The frenulum did alisa when stretched  Small notch in the upper alveolar ridge  Neck:  Supple, symmetrical, trachea midline                Respiratory:  No grunting, flaring, retractions, breath sounds clear and equal           Cardiovascular:  Regular rate and rhythm  No murmur  Adequate perfusion/capillary refill   Femoral pulse present                  Abdomen:    Soft, non-tender, no masses, bowel sounds present, no HSM            Genitourinary:  Normal female genitalia, anus patent                         Spine:   No abnormalities noted       Musculoskeletal:   Full range of motion         Skin/Hair/Nails:   Skin warm, dry, and intact, no rashes or abnormal dyspigmentation or lesions               Neurologic:   No abnormal movement, tone appropriate for gestational age     Latch:  Efficiency:               Lips Flanged: Yes, after repositioning              Depth of latch: deep after repositioning but Minerva Sin frequently would let go of the breast and then reattach herself and continue feeding              Audible Swallow: Yes, frequent and rhythmic although a loud "chucking" sound was noted with almost every suck              Visible Milk: Yes              Wide Open/ Asymmetrical: Yes, after repositioning              Suck Swallow Cycle: Breathing: unlabored, Coordinated: yes  Nipple Assessment after latch: Normal: elongated/eraser, no discoloration and no damage noted  Latch Problems: Initially latch was shallow and Abiola had a poor seal on the breast   After repositioning, a deep asymmetric latch was achieved and there was a complete seal on the breast   However, repositioning did not resolve the "chucking" noise with almost every suck and Minerva Sin still came off the breast frequently throughout the entire feeding  She was completely content after feeding and Kinsey's breast was comfortably emptied    Position:  Infant's Ergonomics/Body               Body Alignment: Yes, after repositioning               Head Supported: Yes               Close to Mom's body/ Lifted/ Supported: Yes               Mom's Ergonomics/Body: Yes                           Supported: Yes                           Sitting Back: Yes                           Brings Baby to her breast: Yes  Positioning Problems: Initially, Marta Wakefield positioned Minerva Sin above the nipple with her head tipped forward and in poor alignment with her body  Handouts:   Paced bottle feeding and Latch Check List    Education:  Reviewed Latch: Demonstrated how to  align the baby so that her nose is just above the nipple with her lower lip and chin touching the breast to encourage the deepest, widest, off-center latch    Discussed how this positioning will lead to the baby's upper and lower lip being flanged and a better seal on the breast    Reviewed Positioning for Dyad: Demonstrated how to position baby "belly to belly" with mom with her ear, shoulder and hip in alignment  Reviewed Alternative/Artificial Feedings: Discussed and demonstrated paced bottle feeding  Plan:  Plan for breastfeeding    Reassurance and support given  Demonstrated position to hold infant (state which ones)  Position Abiola with her chin on the breast (head tipped slightly back) with the nipple just below her nose  Lean slightly back while feeding to deepen the latch even more  Supplementation recommended (document method-education if necessary)  Expressed milk via paced bottle feeding as desired  I encouraged Sydney Monroe to call back if there are any concerns with Abiola's weight gain, her supply, or if breastfeeding issues do not resolve  I have spent 60 minutes with Patient and family today in which greater than 50% of this time was spent in counseling/coordination of care regarding Patient and family education

## 2020-07-23 NOTE — PATIENT INSTRUCTIONS
Continue to offer the breast on demand paying close attention to positioning for a deeper latch  Refer to the instructional video "Attaching Your Baby at the Breast" on the [address] [place] [address] website for further review  When feeding your expressed milk, use paced bottle feeding  This method is less stressful for your baby, prevents overfeeding and protects the breastfeeding relationship  Please call with any concerns with Abiola's growth, Kinsey's supply, or any other breastfeeding concerns

## 2020-07-26 NOTE — PROGRESS NOTES
I have reviewed the notes, assessments, and/or procedures performed by Hansa Bateman RN, IBCLC, I concur with her/his documentation of Gopal Eaton MD 07/25/20

## 2020-10-06 ENCOUNTER — LAB REQUISITION (OUTPATIENT)
Dept: LAB | Facility: HOSPITAL | Age: 30
End: 2020-10-06
Payer: COMMERCIAL

## 2020-10-06 DIAGNOSIS — Z11.59 ENCOUNTER FOR SCREENING FOR OTHER VIRAL DISEASES: ICD-10-CM

## 2020-10-06 LAB — SARS-COV-2 RNA RESP QL NAA+PROBE: NEGATIVE

## 2020-10-06 PROCEDURE — U0003 INFECTIOUS AGENT DETECTION BY NUCLEIC ACID (DNA OR RNA); SEVERE ACUTE RESPIRATORY SYNDROME CORONAVIRUS 2 (SARS-COV-2) (CORONAVIRUS DISEASE [COVID-19]), AMPLIFIED PROBE TECHNIQUE, MAKING USE OF HIGH THROUGHPUT TECHNOLOGIES AS DESCRIBED BY CMS-2020-01-R: HCPCS | Performed by: PHYSICIAN ASSISTANT

## 2020-10-13 ENCOUNTER — LAB REQUISITION (OUTPATIENT)
Dept: LAB | Facility: HOSPITAL | Age: 30
End: 2020-10-13

## 2020-10-13 DIAGNOSIS — Z11.59 ENCOUNTER FOR SCREENING FOR OTHER VIRAL DISEASES: ICD-10-CM

## 2020-10-13 PROCEDURE — U0003 INFECTIOUS AGENT DETECTION BY NUCLEIC ACID (DNA OR RNA); SEVERE ACUTE RESPIRATORY SYNDROME CORONAVIRUS 2 (SARS-COV-2) (CORONAVIRUS DISEASE [COVID-19]), AMPLIFIED PROBE TECHNIQUE, MAKING USE OF HIGH THROUGHPUT TECHNOLOGIES AS DESCRIBED BY CMS-2020-01-R: HCPCS | Performed by: PHYSICIAN ASSISTANT

## 2020-10-14 LAB — SARS-COV-2 RNA SPEC QL NAA+PROBE: NOT DETECTED

## 2020-10-21 ENCOUNTER — TELEPHONE (OUTPATIENT)
Dept: OBGYN CLINIC | Facility: CLINIC | Age: 30
End: 2020-10-21

## 2020-10-30 ENCOUNTER — OFFICE VISIT (OUTPATIENT)
Dept: INTERNAL MEDICINE CLINIC | Facility: CLINIC | Age: 30
End: 2020-10-30
Payer: COMMERCIAL

## 2020-10-30 VITALS
WEIGHT: 138.4 LBS | OXYGEN SATURATION: 100 % | BODY MASS INDEX: 24.52 KG/M2 | HEIGHT: 63 IN | DIASTOLIC BLOOD PRESSURE: 62 MMHG | RESPIRATION RATE: 16 BRPM | TEMPERATURE: 98 F | HEART RATE: 59 BPM | SYSTOLIC BLOOD PRESSURE: 92 MMHG

## 2020-10-30 DIAGNOSIS — Z13.6 SCREENING FOR CARDIOVASCULAR CONDITION: ICD-10-CM

## 2020-10-30 DIAGNOSIS — Z80.3 FAMILY HISTORY OF BREAST CANCER: ICD-10-CM

## 2020-10-30 DIAGNOSIS — Z00.00 WELLNESS EXAMINATION: Primary | ICD-10-CM

## 2020-10-30 DIAGNOSIS — Z13.0 SCREENING, ANEMIA, DEFICIENCY, IRON: ICD-10-CM

## 2020-10-30 PROCEDURE — 99395 PREV VISIT EST AGE 18-39: CPT | Performed by: INTERNAL MEDICINE

## 2020-10-30 PROCEDURE — 3008F BODY MASS INDEX DOCD: CPT | Performed by: INTERNAL MEDICINE

## 2020-10-30 PROCEDURE — 3725F SCREEN DEPRESSION PERFORMED: CPT | Performed by: INTERNAL MEDICINE

## 2020-11-01 PROBLEM — Z80.3 FAMILY HISTORY OF BREAST CANCER: Status: ACTIVE | Noted: 2020-11-01

## 2020-11-01 PROBLEM — Z13.0 SCREENING, ANEMIA, DEFICIENCY, IRON: Status: ACTIVE | Noted: 2020-11-01

## 2020-11-01 PROBLEM — Z13.6 SCREENING FOR CARDIOVASCULAR CONDITION: Status: ACTIVE | Noted: 2020-11-01

## 2020-11-12 ENCOUNTER — TELEPHONE (OUTPATIENT)
Dept: OBGYN CLINIC | Facility: CLINIC | Age: 30
End: 2020-11-12

## 2020-11-23 ENCOUNTER — PROCEDURE VISIT (OUTPATIENT)
Dept: OBGYN CLINIC | Facility: CLINIC | Age: 30
End: 2020-11-23
Payer: COMMERCIAL

## 2020-11-23 VITALS — DIASTOLIC BLOOD PRESSURE: 72 MMHG | BODY MASS INDEX: 23.91 KG/M2 | WEIGHT: 135 LBS | SYSTOLIC BLOOD PRESSURE: 112 MMHG

## 2020-11-23 DIAGNOSIS — Z30.430 ENCOUNTER FOR IUD INSERTION: Primary | ICD-10-CM

## 2020-11-23 DIAGNOSIS — Z32.02 NEGATIVE PREGNANCY TEST: ICD-10-CM

## 2020-11-23 LAB — SL AMB POCT URINE HCG: NEGATIVE

## 2020-11-23 PROCEDURE — 58300 INSERT INTRAUTERINE DEVICE: CPT | Performed by: NURSE PRACTITIONER

## 2020-11-23 PROCEDURE — 81025 URINE PREGNANCY TEST: CPT | Performed by: NURSE PRACTITIONER

## 2020-11-23 RX ORDER — COPPER 313.4 MG/1
1 INTRAUTERINE DEVICE INTRAUTERINE ONCE
Status: COMPLETED | OUTPATIENT
Start: 2020-11-23 | End: 2020-11-23

## 2020-11-23 RX ADMIN — COPPER 1 INTRA UTERINE DEVICE: 313.4 INTRAUTERINE DEVICE INTRAUTERINE at 09:17

## 2020-12-11 ENCOUNTER — OFFICE VISIT (OUTPATIENT)
Dept: URGENT CARE | Age: 30
End: 2020-12-11
Payer: COMMERCIAL

## 2020-12-11 ENCOUNTER — TELEMEDICINE (OUTPATIENT)
Dept: INTERNAL MEDICINE CLINIC | Facility: CLINIC | Age: 30
End: 2020-12-11
Payer: COMMERCIAL

## 2020-12-11 VITALS — TEMPERATURE: 98.3 F | RESPIRATION RATE: 16 BRPM | HEART RATE: 91 BPM | OXYGEN SATURATION: 97 %

## 2020-12-11 VITALS
HEART RATE: 91 BPM | HEIGHT: 63 IN | OXYGEN SATURATION: 98 % | BODY MASS INDEX: 23.92 KG/M2 | TEMPERATURE: 99.8 F | WEIGHT: 135 LBS

## 2020-12-11 DIAGNOSIS — Z20.822 EXPOSURE TO COVID-19 VIRUS: Primary | ICD-10-CM

## 2020-12-11 DIAGNOSIS — B34.9 VIRAL SYNDROME: Primary | ICD-10-CM

## 2020-12-11 DIAGNOSIS — Z20.822 EXPOSURE TO COVID-19 VIRUS: ICD-10-CM

## 2020-12-11 PROCEDURE — 99213 OFFICE O/P EST LOW 20 MIN: CPT | Performed by: FAMILY MEDICINE

## 2020-12-11 PROCEDURE — 87637 SARSCOV2&INF A&B&RSV AMP PRB: CPT | Performed by: FAMILY MEDICINE

## 2020-12-11 PROCEDURE — 3008F BODY MASS INDEX DOCD: CPT | Performed by: INTERNAL MEDICINE

## 2020-12-11 PROCEDURE — U0003 INFECTIOUS AGENT DETECTION BY NUCLEIC ACID (DNA OR RNA); SEVERE ACUTE RESPIRATORY SYNDROME CORONAVIRUS 2 (SARS-COV-2) (CORONAVIRUS DISEASE [COVID-19]), AMPLIFIED PROBE TECHNIQUE, MAKING USE OF HIGH THROUGHPUT TECHNOLOGIES AS DESCRIBED BY CMS-2020-01-R: HCPCS | Performed by: NURSE PRACTITIONER

## 2020-12-11 PROCEDURE — 99442 PR PHYS/QHP TELEPHONE EVALUATION 11-20 MIN: CPT | Performed by: NURSE PRACTITIONER

## 2020-12-12 LAB — SARS-COV-2 RNA SPEC QL NAA+PROBE: NOT DETECTED

## 2020-12-14 LAB
FLUAV RNA NPH QL NAA+PROBE: NOT DETECTED
FLUBV RNA NPH QL NAA+PROBE: NOT DETECTED
RSV RNA NPH QL NAA+PROBE: NOT DETECTED
SARS-COV-2 RNA NPH QL NAA+PROBE: NOT DETECTED

## 2020-12-15 ENCOUNTER — TELEPHONE (OUTPATIENT)
Dept: URGENT CARE | Age: 30
End: 2020-12-15

## 2020-12-15 PROBLEM — Z20.822 EXPOSURE TO COVID-19 VIRUS: Status: ACTIVE | Noted: 2020-12-15

## 2020-12-30 ENCOUNTER — IMMUNIZATIONS (OUTPATIENT)
Dept: FAMILY MEDICINE CLINIC | Facility: HOSPITAL | Age: 30
End: 2020-12-30
Payer: COMMERCIAL

## 2020-12-30 DIAGNOSIS — Z23 ENCOUNTER FOR IMMUNIZATION: ICD-10-CM

## 2020-12-30 PROCEDURE — 0011A SARS-COV-2 / COVID-19 MRNA VACCINE (MODERNA) 100 MCG: CPT

## 2020-12-30 PROCEDURE — 91301 SARS-COV-2 / COVID-19 MRNA VACCINE (MODERNA) 100 MCG: CPT

## 2021-01-26 ENCOUNTER — IMMUNIZATIONS (OUTPATIENT)
Dept: FAMILY MEDICINE CLINIC | Facility: HOSPITAL | Age: 31
End: 2021-01-26

## 2021-01-26 DIAGNOSIS — Z23 ENCOUNTER FOR IMMUNIZATION: Primary | ICD-10-CM

## 2021-01-26 PROCEDURE — 91301 SARS-COV-2 / COVID-19 MRNA VACCINE (MODERNA) 100 MCG: CPT

## 2021-01-26 PROCEDURE — 0012A SARS-COV-2 / COVID-19 MRNA VACCINE (MODERNA) 100 MCG: CPT

## 2021-10-14 ENCOUNTER — OFFICE VISIT (OUTPATIENT)
Dept: OBGYN CLINIC | Facility: CLINIC | Age: 31
End: 2021-10-14
Payer: COMMERCIAL

## 2021-10-14 VITALS — SYSTOLIC BLOOD PRESSURE: 122 MMHG | WEIGHT: 128.8 LBS | DIASTOLIC BLOOD PRESSURE: 56 MMHG | BODY MASS INDEX: 22.82 KG/M2

## 2021-10-14 DIAGNOSIS — Z30.011 ORAL CONTRACEPTION INITIAL PRESCRIPTION: ICD-10-CM

## 2021-10-14 DIAGNOSIS — Z30.431 IUD CHECK UP: Primary | ICD-10-CM

## 2021-10-14 PROCEDURE — 58301 REMOVE INTRAUTERINE DEVICE: CPT | Performed by: OBSTETRICS & GYNECOLOGY

## 2021-10-14 RX ORDER — COPPER 313.4 MG/1
1 INTRAUTERINE DEVICE INTRAUTERINE ONCE
COMMUNITY
Start: 2020-11-23 | End: 2021-10-14

## 2021-10-14 RX ORDER — NORETHINDRONE ACETATE AND ETHINYL ESTRADIOL 1; .02 MG/1; MG/1
1 TABLET ORAL DAILY
Qty: 84 TABLET | Refills: 0 | Status: SHIPPED | OUTPATIENT
Start: 2021-10-14 | End: 2021-11-15 | Stop reason: SDUPTHER

## 2021-10-25 ENCOUNTER — OFFICE VISIT (OUTPATIENT)
Dept: INTERNAL MEDICINE CLINIC | Facility: CLINIC | Age: 31
End: 2021-10-25
Payer: COMMERCIAL

## 2021-10-25 VITALS
BODY MASS INDEX: 22.93 KG/M2 | WEIGHT: 129.4 LBS | OXYGEN SATURATION: 100 % | HEIGHT: 63 IN | DIASTOLIC BLOOD PRESSURE: 52 MMHG | HEART RATE: 73 BPM | SYSTOLIC BLOOD PRESSURE: 88 MMHG

## 2021-10-25 DIAGNOSIS — F41.9 ANXIETY AND DEPRESSION: Primary | ICD-10-CM

## 2021-10-25 DIAGNOSIS — D50.9 IRON DEFICIENCY ANEMIA, UNSPECIFIED IRON DEFICIENCY ANEMIA TYPE: ICD-10-CM

## 2021-10-25 DIAGNOSIS — F32.A ANXIETY AND DEPRESSION: Primary | ICD-10-CM

## 2021-10-25 PROCEDURE — 3725F SCREEN DEPRESSION PERFORMED: CPT | Performed by: NURSE PRACTITIONER

## 2021-10-25 PROCEDURE — 3008F BODY MASS INDEX DOCD: CPT | Performed by: NURSE PRACTITIONER

## 2021-10-25 PROCEDURE — 99213 OFFICE O/P EST LOW 20 MIN: CPT | Performed by: NURSE PRACTITIONER

## 2021-10-25 RX ORDER — SERTRALINE HYDROCHLORIDE 25 MG/1
TABLET, FILM COATED ORAL
Qty: 60 TABLET | Refills: 2 | Status: SHIPPED | OUTPATIENT
Start: 2021-10-25 | End: 2021-11-08 | Stop reason: SDUPTHER

## 2021-10-25 RX ORDER — PHENOL 1.4 %
10 AEROSOL, SPRAY (ML) MUCOUS MEMBRANE
COMMUNITY

## 2021-11-08 ENCOUNTER — TELEPHONE (OUTPATIENT)
Dept: OBGYN CLINIC | Facility: CLINIC | Age: 31
End: 2021-11-08

## 2021-11-08 DIAGNOSIS — F32.A ANXIETY AND DEPRESSION: ICD-10-CM

## 2021-11-08 DIAGNOSIS — F41.9 ANXIETY AND DEPRESSION: ICD-10-CM

## 2021-11-09 ENCOUNTER — APPOINTMENT (OUTPATIENT)
Dept: LAB | Age: 31
End: 2021-11-09
Payer: COMMERCIAL

## 2021-11-09 DIAGNOSIS — D50.9 IRON DEFICIENCY ANEMIA, UNSPECIFIED IRON DEFICIENCY ANEMIA TYPE: ICD-10-CM

## 2021-11-09 LAB
ALBUMIN SERPL BCP-MCNC: 3.7 G/DL (ref 3.5–5)
ALP SERPL-CCNC: 31 U/L (ref 46–116)
ALT SERPL W P-5'-P-CCNC: 23 U/L (ref 12–78)
ANION GAP SERPL CALCULATED.3IONS-SCNC: 6 MMOL/L (ref 4–13)
AST SERPL W P-5'-P-CCNC: 14 U/L (ref 5–45)
BASOPHILS # BLD AUTO: 0.03 THOUSANDS/ΜL (ref 0–0.1)
BASOPHILS NFR BLD AUTO: 1 % (ref 0–1)
BILIRUB SERPL-MCNC: 0.39 MG/DL (ref 0.2–1)
BUN SERPL-MCNC: 17 MG/DL (ref 5–25)
CALCIUM SERPL-MCNC: 9 MG/DL (ref 8.3–10.1)
CHLORIDE SERPL-SCNC: 109 MMOL/L (ref 100–108)
CHOLEST SERPL-MCNC: 136 MG/DL (ref 50–200)
CO2 SERPL-SCNC: 26 MMOL/L (ref 21–32)
CREAT SERPL-MCNC: 0.72 MG/DL (ref 0.6–1.3)
EOSINOPHIL # BLD AUTO: 0.09 THOUSAND/ΜL (ref 0–0.61)
EOSINOPHIL NFR BLD AUTO: 2 % (ref 0–6)
ERYTHROCYTE [DISTWIDTH] IN BLOOD BY AUTOMATED COUNT: 13.5 % (ref 11.6–15.1)
FERRITIN SERPL-MCNC: 8 NG/ML (ref 8–388)
GFR SERPL CREATININE-BSD FRML MDRD: 113 ML/MIN/1.73SQ M
GLUCOSE P FAST SERPL-MCNC: 80 MG/DL (ref 65–99)
HCT VFR BLD AUTO: 41.6 % (ref 34.8–46.1)
HDLC SERPL-MCNC: 65 MG/DL
HGB BLD-MCNC: 13 G/DL (ref 11.5–15.4)
IMM GRANULOCYTES # BLD AUTO: 0.01 THOUSAND/UL (ref 0–0.2)
IMM GRANULOCYTES NFR BLD AUTO: 0 % (ref 0–2)
IRON SERPL-MCNC: 61 UG/DL (ref 50–170)
LDLC SERPL CALC-MCNC: 64 MG/DL (ref 0–100)
LYMPHOCYTES # BLD AUTO: 1.74 THOUSANDS/ΜL (ref 0.6–4.47)
LYMPHOCYTES NFR BLD AUTO: 46 % (ref 14–44)
MCH RBC QN AUTO: 27.8 PG (ref 26.8–34.3)
MCHC RBC AUTO-ENTMCNC: 31.3 G/DL (ref 31.4–37.4)
MCV RBC AUTO: 89 FL (ref 82–98)
MONOCYTES # BLD AUTO: 0.28 THOUSAND/ΜL (ref 0.17–1.22)
MONOCYTES NFR BLD AUTO: 7 % (ref 4–12)
NEUTROPHILS # BLD AUTO: 1.69 THOUSANDS/ΜL (ref 1.85–7.62)
NEUTS SEG NFR BLD AUTO: 44 % (ref 43–75)
NONHDLC SERPL-MCNC: 71 MG/DL
NRBC BLD AUTO-RTO: 0 /100 WBCS
PLATELET # BLD AUTO: 247 THOUSANDS/UL (ref 149–390)
PMV BLD AUTO: 11.8 FL (ref 8.9–12.7)
POTASSIUM SERPL-SCNC: 4.1 MMOL/L (ref 3.5–5.3)
PROT SERPL-MCNC: 7.1 G/DL (ref 6.4–8.2)
RBC # BLD AUTO: 4.68 MILLION/UL (ref 3.81–5.12)
SODIUM SERPL-SCNC: 141 MMOL/L (ref 136–145)
TRIGL SERPL-MCNC: 35 MG/DL
WBC # BLD AUTO: 3.84 THOUSAND/UL (ref 4.31–10.16)

## 2021-11-09 PROCEDURE — 85025 COMPLETE CBC W/AUTO DIFF WBC: CPT

## 2021-11-09 PROCEDURE — 83540 ASSAY OF IRON: CPT

## 2021-11-09 PROCEDURE — 80061 LIPID PANEL: CPT

## 2021-11-09 PROCEDURE — 36415 COLL VENOUS BLD VENIPUNCTURE: CPT

## 2021-11-09 PROCEDURE — 80053 COMPREHEN METABOLIC PANEL: CPT

## 2021-11-09 PROCEDURE — 82728 ASSAY OF FERRITIN: CPT

## 2021-11-15 DIAGNOSIS — Z30.011 ORAL CONTRACEPTION INITIAL PRESCRIPTION: ICD-10-CM

## 2021-11-15 RX ORDER — NORETHINDRONE ACETATE AND ETHINYL ESTRADIOL 1; .02 MG/1; MG/1
1 TABLET ORAL DAILY
Qty: 84 TABLET | Refills: 0 | Status: SHIPPED | OUTPATIENT
Start: 2021-11-15 | End: 2021-12-27

## 2021-12-02 ENCOUNTER — TELEMEDICINE (OUTPATIENT)
Dept: INTERNAL MEDICINE CLINIC | Facility: CLINIC | Age: 31
End: 2021-12-02
Payer: COMMERCIAL

## 2021-12-02 DIAGNOSIS — F41.9 ANXIETY AND DEPRESSION: Primary | ICD-10-CM

## 2021-12-02 DIAGNOSIS — F32.A ANXIETY AND DEPRESSION: Primary | ICD-10-CM

## 2021-12-02 PROCEDURE — 1036F TOBACCO NON-USER: CPT | Performed by: INTERNAL MEDICINE

## 2021-12-02 PROCEDURE — 99214 OFFICE O/P EST MOD 30 MIN: CPT | Performed by: INTERNAL MEDICINE

## 2021-12-10 ENCOUNTER — IMMUNIZATIONS (OUTPATIENT)
Dept: FAMILY MEDICINE CLINIC | Facility: HOSPITAL | Age: 31
End: 2021-12-10

## 2021-12-10 DIAGNOSIS — Z23 ENCOUNTER FOR IMMUNIZATION: Primary | ICD-10-CM

## 2021-12-10 PROCEDURE — 91301 COVID-19 MODERNA VACC 0.5 ML: CPT

## 2021-12-10 PROCEDURE — 0011A COVID-19 MODERNA VACC 0.5 ML: CPT

## 2021-12-23 DIAGNOSIS — Z30.011 ORAL CONTRACEPTION INITIAL PRESCRIPTION: ICD-10-CM

## 2021-12-27 RX ORDER — NORETHINDRONE ACETATE AND ETHINYL ESTRADIOL 1; .02 MG/1; MG/1
1 TABLET ORAL DAILY
Qty: 63 TABLET | Refills: 3 | Status: SHIPPED | OUTPATIENT
Start: 2021-12-27

## 2021-12-28 ENCOUNTER — SOCIAL WORK (OUTPATIENT)
Dept: BEHAVIORAL/MENTAL HEALTH CLINIC | Facility: CLINIC | Age: 31
End: 2021-12-28
Payer: COMMERCIAL

## 2021-12-28 DIAGNOSIS — F41.9 ANXIETY AND DEPRESSION: Primary | ICD-10-CM

## 2021-12-28 DIAGNOSIS — F32.A ANXIETY AND DEPRESSION: Primary | ICD-10-CM

## 2021-12-28 PROCEDURE — 90832 PSYTX W PT 30 MINUTES: CPT | Performed by: SOCIAL WORKER

## 2022-01-10 ENCOUNTER — TELEPHONE (OUTPATIENT)
Dept: OBGYN CLINIC | Facility: CLINIC | Age: 32
End: 2022-01-10

## 2022-01-10 DIAGNOSIS — B37.3 YEAST VAGINITIS: Primary | ICD-10-CM

## 2022-01-10 RX ORDER — FLUCONAZOLE 150 MG/1
150 TABLET ORAL ONCE
Qty: 1 TABLET | Refills: 0 | Status: SHIPPED | OUTPATIENT
Start: 2022-01-10 | End: 2022-01-10

## 2022-01-10 NOTE — TELEPHONE ENCOUNTER
Pt is over due yearly, Last Pap: NILM/HPV neg 1/2/2020,     Pt last visit PPV 07/22/20-was advised to return 3-4 months for yearly or sooner  Pt today c/o green discharge and itching both internal and external  Sx began on 01/06/22  Pt denied odor  Pt confirmed [facility]   Please advise pt confirmed she schedule yearly for 03/22

## 2022-01-10 NOTE — TELEPHONE ENCOUNTER
Pt feels as if she has a yeast infection she has had a white discharge that has now turned green  She says the itching is still around after using the monistat but the burning has went away since using otc medication  Please advise

## 2022-03-16 DIAGNOSIS — F41.9 ANXIETY AND DEPRESSION: ICD-10-CM

## 2022-03-16 DIAGNOSIS — F32.A ANXIETY AND DEPRESSION: ICD-10-CM

## 2022-04-11 ENCOUNTER — OFFICE VISIT (OUTPATIENT)
Dept: INTERNAL MEDICINE CLINIC | Facility: CLINIC | Age: 32
End: 2022-04-11
Payer: COMMERCIAL

## 2022-04-11 VITALS
RESPIRATION RATE: 16 BRPM | HEART RATE: 62 BPM | OXYGEN SATURATION: 98 % | HEIGHT: 63 IN | BODY MASS INDEX: 23.35 KG/M2 | WEIGHT: 131.8 LBS | SYSTOLIC BLOOD PRESSURE: 92 MMHG | DIASTOLIC BLOOD PRESSURE: 62 MMHG

## 2022-04-11 DIAGNOSIS — R06.02 SHORTNESS OF BREATH: ICD-10-CM

## 2022-04-11 DIAGNOSIS — F32.A ANXIETY AND DEPRESSION: Primary | ICD-10-CM

## 2022-04-11 DIAGNOSIS — R07.89 ATYPICAL CHEST PAIN: ICD-10-CM

## 2022-04-11 DIAGNOSIS — R94.31 ABNORMAL EKG: ICD-10-CM

## 2022-04-11 DIAGNOSIS — R06.02 SOB (SHORTNESS OF BREATH): ICD-10-CM

## 2022-04-11 DIAGNOSIS — F41.9 ANXIETY AND DEPRESSION: Primary | ICD-10-CM

## 2022-04-11 PROCEDURE — 99214 OFFICE O/P EST MOD 30 MIN: CPT | Performed by: INTERNAL MEDICINE

## 2022-04-11 PROCEDURE — 3008F BODY MASS INDEX DOCD: CPT | Performed by: INTERNAL MEDICINE

## 2022-04-11 PROCEDURE — 1036F TOBACCO NON-USER: CPT | Performed by: INTERNAL MEDICINE

## 2022-04-11 NOTE — PROGRESS NOTES
Assessment/Plan:    Atypical chest pain  Check stress test/echocardiogram her symptoms are reproducible/and exertional    Anxiety and depression  Patient reports me her symptoms are currently stable to continue the current dose of Zoloft    Shortness of breath  Over last 3 months accompanied with chest tightness/chest wall pain symptoms are exertional and all so reproduced by palpation she has recently started birth control will check CTA of chest rule out pulmonary embolism patient reports me not pregnant, recommend Voltaren gel apply to affected area 4 times a day x7 days warm compress no heavy lifting if symptoms change or worsen go immediately to the ER RTO in 4 weeks call if any problems         Problem List Items Addressed This Visit        Other    Anxiety and depression - Primary     Patient reports me her symptoms are currently stable to continue the current dose of Zoloft         Atypical chest pain     Check stress test/echocardiogram her symptoms are reproducible/and exertional         Shortness of breath     Over last 3 months accompanied with chest tightness/chest wall pain symptoms are exertional and all so reproduced by palpation she has recently started birth control will check CTA of chest rule out pulmonary embolism patient reports me not pregnant, recommend Voltaren gel apply to affected area 4 times a day x7 days warm compress no heavy lifting if symptoms change or worsen go immediately to the ER RTO in 4 weeks call if any problems           Other Visit Diagnoses     SOB (shortness of breath)        Relevant Orders    CTA chest pe study    Stress test only, exercise    Abnormal EKG        Relevant Orders    CTA chest pe study    Echo complete w/ contrast if indicated    Stress test only, exercise          RTO in 4 weeks call if any problems pulse ox at rest 98% with ambulation 97% on room air  Subjective:      Patient ID: Jacki Sagastume is a 28 y o  female      HPI 36-year old female coming in for a follow up office visit regarding shortness of breath, atypical chest pain, abnormal EKG, anxiety; patient reports me over last 3 weeks she has noticed difficulty getting air in her lungs/shortness of breath reports me chest discomfort right side symptoms are at rest and on exertion and worsened with palpation; recently started birth control several months ago no recent travel no injuries she does reports me lifting baby; patient is right handed; patient works as a nurse in the ER EKG taking the ER last night does show nonspecific T-wave inversion and old septal infarction; she reports me pulled muscle across the chest motrin dosent help but lidocaine helps,  Heavy lifting , anxiety better with the zoloft alittle sob, like I cant fill the lungs , no cough, yawn to fill the lungs ,  No change with exercize , chest wall pain , reproducible , for 3 weeks ,  ekg in er negative  On birthcontroll  Running and exercize make it worse    The following portions of the patient's history were reviewed and updated as appropriate: allergies, current medications, past family history, past medical history, past social history, past surgical history and problem list     Review of Systems   Constitutional: Negative for activity change, appetite change and unexpected weight change  HENT: Negative for congestion  Eyes: Negative for visual disturbance  Respiratory: Positive for shortness of breath  Negative for cough  Cardiovascular: Positive for chest pain  Gastrointestinal: Negative for abdominal pain, diarrhea, nausea and vomiting  Neurological: Negative for dizziness, light-headedness and headaches  Objective:    No follow-ups on file  No results found        No Known Allergies    Past Medical History:   Diagnosis Date    Anemia     Celiac disease 2007    Fibroadenoma of breast, right     resolved 2012    IUD contraception 11/23/2020    Paragard      Past Surgical History:   Procedure Laterality Date  [place]    [address] EXTRACTION Bilateral 2011     Current Outpatient Medications on File Prior to Visit   Medication Sig Dispense Refill    Melatonin 10 MG TABS Take 10 mg by mouth      norethindrone-ethinyl estradiol (MICROGESTIN 1/20) 1-20 MG-MCG per tablet TAKE 1 TABLET BY MOUTH  DAILY 63 tablet 3    sertraline (ZOLOFT) 50 mg tablet TAKE 1 TABLET BY MOUTH  DAILY 90 tablet 3    [DISCONTINUED] ferrous sulfate 324 (65 Fe) mg Take 1 tablet (324 mg total) by mouth 2 (two) times a day before meals 30 tablet 2    [DISCONTINUED] Prenatal Vit-Fe Fumarate-FA (M-VIT PO)  (Patient not taking: Reported on 10/14/2021)       No current facility-administered medications on file prior to visit       Family History   Problem Relation Age of Onset    Learning disabilities Brother     Cancer Maternal Aunt     Breast cancer Maternal Aunt     Mental retardation Maternal Uncle     Cancer Paternal Grandmother     No Known Problems Mother     No Known Problems Father     No Known Problems Daughter     Diabetes Maternal Grandmother     No Known Problems Maternal Grandfather      Social History     Socioeconomic History    Marital status: /Civil Union     Spouse name: Hany Kirkpatrick Number of children: 1    Years of education: Not on file    Highest education level: Not on file   Occupational History    Occupation: RN      Employer: [employer]   Tobacco Use    Smoking status: Never Smoker    Smokeless tobacco: Never Used   Vaping Use    Vaping Use: Never used   Substance and Sexual Activity    Alcohol use: No    Drug use: No    Sexual activity: Not Currently     Partners: Male   Other Topics Concern    Not on file   Social History Narrative    ** Merged History Encounter **               Social Determinants of Health     Financial Resource Strain: Not on file   Food Insecurity: Not on file   Transportation Needs: Not on file   Physical Activity: Not on file   Stress: Not on file   Social Connections: Not on file   Intimate Partner Violence: Not on file   Housing Stability: Not on file     Vitals:    04/11/22 1028   BP: 92/62   Pulse: 62   Resp: 16   SpO2: 98%   Weight: 59 8 kg (131 lb 12 8 oz)   Height: 5' 3" (1 6 m)     Results for orders placed or performed in visit on 11/09/21   CBC and differential   Result Value Ref Range    WBC 3 84 (L) 4 31 - 10 16 Thousand/uL    RBC 4 68 3 81 - 5 12 Million/uL    Hemoglobin 13 0 11 5 - 15 4 g/dL    Hematocrit 41 6 34 8 - 46 1 %    MCV 89 82 - 98 fL    MCH 27 8 26 8 - 34 3 pg    MCHC 31 3 (L) 31 4 - 37 4 g/dL    RDW 13 5 11 6 - 15 1 %    MPV 11 8 8 9 - 12 7 fL    Platelets 245 664 - 909 Thousands/uL    nRBC 0 /100 WBCs    Neutrophils Relative 44 43 - 75 %    Immat GRANS % 0 0 - 2 %    Lymphocytes Relative 46 (H) 14 - 44 %    Monocytes Relative 7 4 - 12 %    Eosinophils Relative 2 0 - 6 %    Basophils Relative 1 0 - 1 %    Neutrophils Absolute 1 69 (L) 1 85 - 7 62 Thousands/µL    Immature Grans Absolute 0 01 0 00 - 0 20 Thousand/uL    Lymphocytes Absolute 1 74 0 60 - 4 47 Thousands/µL    Monocytes Absolute 0 28 0 17 - 1 22 Thousand/µL    Eosinophils Absolute 0 09 0 00 - 0 61 Thousand/µL    Basophils Absolute 0 03 0 00 - 0 10 Thousands/µL   Ferritin   Result Value Ref Range    Ferritin 8 8 - 388 ng/mL   Iron   Result Value Ref Range    Iron 61 50 - 170 ug/dL   Comprehensive metabolic panel   Result Value Ref Range    Sodium 141 136 - 145 mmol/L    Potassium 4 1 3 5 - 5 3 mmol/L    Chloride 109 (H) 100 - 108 mmol/L    CO2 26 21 - 32 mmol/L    ANION GAP 6 4 - 13 mmol/L    BUN 17 5 - 25 mg/dL    Creatinine 0 72 0 60 - 1 30 mg/dL    Glucose, Fasting 80 65 - 99 mg/dL    Calcium 9 0 8 3 - 10 1 mg/dL    AST 14 5 - 45 U/L    ALT 23 12 - 78 U/L    Alkaline Phosphatase 31 (L) 46 - 116 U/L    Total Protein 7 1 6 4 - 8 2 g/dL    Albumin 3 7 3 5 - 5 0 g/dL    Total Bilirubin 0 39 0 20 - 1 00 mg/dL    eGFR 113 ml/min/1 73sq m Lipid panel   Result Value Ref Range    Cholesterol 136 50 - 200 mg/dL    Triglycerides 35 <=150 mg/dL    HDL, Direct 65 >=40 mg/dL    LDL Calculated 64 0 - 100 mg/dL    Non-HDL-Chol (CHOL-HDL) 71 mg/dl     Weight (last 2 days)     Date/Time Weight    04/11/22 1028 59 8 (131 8)        Body mass index is 23 35 kg/m²  BP      Temp      Pulse     Resp      SpO2        Vitals:    04/11/22 1028   Weight: 59 8 kg (131 lb 12 8 oz)     Vitals:    04/11/22 1028   Weight: 59 8 kg (131 lb 12 8 oz)       BP 92/62   Pulse 62   Resp 16   Ht 5' 3" (1 6 m)   Wt 59 8 kg (131 lb 12 8 oz)   SpO2 98%   BMI 23 35 kg/m²     Medical assistant Mary Bone in the room during the examination there is tenderness of the intercostal muscle right T 1 to T3 region also tenderness of the costochondral junction of the right upper chest wall   Physical Exam  Constitutional:       Appearance: She is well-developed  HENT:      Head: Normocephalic  Eyes:      General: No scleral icterus  Right eye: No discharge  Left eye: No discharge  Conjunctiva/sclera: Conjunctivae normal       Pupils: Pupils are equal, round, and reactive to light  Cardiovascular:      Rate and Rhythm: Normal rate and regular rhythm  Heart sounds: Normal heart sounds  No murmur heard  No friction rub  No gallop  Pulmonary:      Effort: No respiratory distress  Breath sounds: Normal breath sounds  No wheezing or rales  Abdominal:      General: Bowel sounds are normal  There is no distension  Palpations: Abdomen is soft  There is no mass  Tenderness: There is no abdominal tenderness  There is no guarding or rebound  Musculoskeletal:         General: No deformity  Cervical back: Neck supple  Lymphadenopathy:      Cervical: No cervical adenopathy  Neurological:      Mental Status: She is alert  Coordination: Coordination normal    Psychiatric:         Mood and Affect: Mood is not anxious or depressed           Thought Content: Thought content does not include suicidal ideation

## 2022-04-11 NOTE — ASSESSMENT & PLAN NOTE
Over last 3 months accompanied with chest tightness/chest wall pain symptoms are exertional and all so reproduced by palpation she has recently started birth control will check CTA of chest rule out pulmonary embolism patient reports me not pregnant, recommend Voltaren gel apply to affected area 4 times a day x7 days warm compress no heavy lifting if symptoms change or worsen go immediately to the ER RTO in 4 weeks call if any problems

## 2022-05-16 ENCOUNTER — HOSPITAL ENCOUNTER (OUTPATIENT)
Dept: NON INVASIVE DIAGNOSTICS | Facility: CLINIC | Age: 32
Discharge: HOME | End: 2022-05-16
Payer: COMMERCIAL

## 2022-05-16 VITALS
HEIGHT: 63 IN | DIASTOLIC BLOOD PRESSURE: 62 MMHG | SYSTOLIC BLOOD PRESSURE: 92 MMHG | BODY MASS INDEX: 23.21 KG/M2 | HEART RATE: 59 BPM | WEIGHT: 131 LBS

## 2022-05-16 DIAGNOSIS — R94.31 ABNORMAL EKG: ICD-10-CM

## 2022-05-16 DIAGNOSIS — R06.02 SOB (SHORTNESS OF BREATH): ICD-10-CM

## 2022-05-16 LAB
AORTIC ROOT: 3 CM
APICAL FOUR CHAMBER EJECTION FRACTION: 65 %
ARRHY DURING EX: NORMAL
ASCENDING AORTA: 3 CM
BASELINE ST DEPRESSION: 0 MM
CHEST PAIN STATEMENT: NORMAL
E WAVE DECELERATION TIME: 144 MS
FRACTIONAL SHORTENING: 34 % (ref 28–44)
INTERVENTRICULAR SEPTUM IN DIASTOLE (PARASTERNAL SHORT AXIS VIEW): 0.8 CM
INTERVENTRICULAR SEPTUM: 0.8 CM (ref 0.6–1.1)
LAAS-AP2: 15.7 CM2
LAAS-AP4: 16.4 CM2
LEFT ATRIUM SIZE: 3.3 CM
LEFT INTERNAL DIMENSION IN SYSTOLE: 3.3 CM (ref 2.1–4)
LEFT VENTRICULAR INTERNAL DIMENSION IN DIASTOLE: 5 CM (ref 3.5–6)
LEFT VENTRICULAR POSTERIOR WALL IN END DIASTOLE: 0.8 CM
LEFT VENTRICULAR STROKE VOLUME: 72 ML
LVSV (TEICH): 72 ML
MAX DIASTOLIC BP: 80 MMHG
MAX HEART RATE: 171 BPM
MAX HR PERCENT: 90 %
MAX HR: 190 BPM
MAX PREDICTED HEART RATE: 190 BPM
MAX. SYSTOLIC BP: 132 MMHG
MV E'TISSUE VEL-SEP: 16 CM/S
MV PEAK A VEL: 0.51 M/S
MV PEAK E VEL: 62 CM/S
MV STENOSIS PRESSURE HALF TIME: 42 MS
MV VALVE AREA P 1/2 METHOD: 5.24 CM2
PROTOCOL NAME: NORMAL
RATE PRESSURE PRODUCT: NORMAL
RIGHT ATRIUM AREA SYSTOLE A4C: 14.1 CM2
RIGHT VENTRICLE ID DIMENSION: 3.5 CM
SL CV LEFT ATRIUM LENGTH A2C: 5.4 CM
SL CV LV EF: 65
SL CV PED ECHO LEFT VENTRICLE DIASTOLIC VOLUME (MOD BIPLANE) 2D: 116 ML
SL CV PED ECHO LEFT VENTRICLE SYSTOLIC VOLUME (MOD BIPLANE) 2D: 44 ML
SL CV STRESS RECOVERY BP: NORMAL MMHG
SL CV STRESS RECOVERY HR: 74 BPM
SL CV STRESS RECOVERY O2 SAT: 98 %
SL CV STRESS STAGE REACHED: 5
STRESS ANGINA INDEX: 0
STRESS BASELINE BP: NORMAL MMHG
STRESS BASELINE HR: 77 BPM
STRESS DUKE TREADMILL SCORE: 14
STRESS O2 SAT REST: 100 %
STRESS PEAK HR: 169 BPM
STRESS PERCENT HR: 90 %
STRESS POST ESTIMATED WORKLOAD: 17.2 METS
STRESS POST EXERCISE DUR MIN: 13 MIN
STRESS POST EXERCISE DUR SEC: 50 SEC
STRESS POST O2 SAT PEAK: 99 %
STRESS POST PEAK BP: 132 MMHG
STRESS ST DEPRESSION: 0 MM
STRESS TARGET HR: 169 BPM
TARGET HR FORMULA: NORMAL
TEST INDICATION: NORMAL
TIME IN EXERCISE PHASE: NORMAL

## 2022-05-16 PROCEDURE — 93016 CV STRESS TEST SUPVJ ONLY: CPT | Performed by: INTERNAL MEDICINE

## 2022-05-16 PROCEDURE — 93017 CV STRESS TEST TRACING ONLY: CPT

## 2022-05-16 PROCEDURE — 93306 TTE W/DOPPLER COMPLETE: CPT | Performed by: INTERNAL MEDICINE

## 2022-05-16 PROCEDURE — 93018 CV STRESS TEST I&R ONLY: CPT | Performed by: INTERNAL MEDICINE

## 2022-05-16 PROCEDURE — 93306 TTE W/DOPPLER COMPLETE: CPT

## 2022-12-18 NOTE — PROGRESS NOTES
Assessment/Plan:  Monitor for/keep record of recurrence of breast pain  Notify me of recurrence  Will consider breast ultrasound  Limit caffeine intake  Breast self exam monthly after menses  Mammogram at age 37        3  Mastalgia in female    2  Family history of breast cancer in female           Subjective:      Patient ID: David Chicas is a 33 y o  female  HPI  David Chicas is a 33 y o   female who is here today for a problem visit     4 days ago experienced lower left breast pain and thought she felt swelling "like a clogged duct"  Rated pain 7/10 at that time  No medication taken  Warm pad used with no lessening of pain  Denies a breast lump, nipple discharge or recent breast or chest trauma  She discontinued breast feeding one year ago  The pain lessened yesterday and today it's almost gone  Menses started today at the expected time  She does not typically experience breast pain with her menses  Monthly menses x 4 days with mod flow  Menses is acceptable  Family history of maternal aunt with breast cancer in her 42's and MGM in her 63's  Her mother is BRCA negative but CHEK positive  The following portions of the patient's history were reviewed and updated as appropriate: allergies, current medications, past medical history, past social history, past surgical history and problem list     Review of Systems   Constitutional: Negative  HENT: Negative for dental problem, mouth sores, postnasal drip, sinus pressure, sinus pain and sore throat  Cardiovascular: Negative for chest pain  Skin: Negative  Hematological: Negative for adenopathy  Psychiatric/Behavioral: Negative  Objective:      BP 92/60 (BP Location: Left arm, Patient Position: Sitting, Cuff Size: Standard)   Ht 5' 3" (1 6 m)   Wt 63 kg (138 lb 12 8 oz)   LMP 2022 (Exact Date)   BMI 24 59 kg/m²          Physical Exam  Vitals and nursing note reviewed     Constitutional:       Appearance: Normal appearance  She is well-developed and normal weight  Chest:   Breasts:     Breasts are symmetrical       Right: Normal  No swelling, inverted nipple, mass, nipple discharge, skin change or tenderness  Left: Normal  No swelling, inverted nipple, mass, nipple discharge, skin change or tenderness  Musculoskeletal:         General: Normal range of motion  Cervical back: Normal range of motion and neck supple  Lymphadenopathy:      Cervical: Cervical adenopathy present  Right cervical: Superficial cervical adenopathy (< 1 cm) present  Upper Body:      Right upper body: No supraclavicular or axillary adenopathy  Left upper body: No supraclavicular or axillary adenopathy  Comments: No infraclavicular lymphadenopathy   Skin:     General: Skin is warm and dry  Neurological:      Mental Status: She is alert and oriented to person, place, and time     Psychiatric:         Mood and Affect: Mood normal          Behavior: Behavior normal

## 2022-12-19 ENCOUNTER — OFFICE VISIT (OUTPATIENT)
Dept: OBGYN CLINIC | Facility: CLINIC | Age: 32
End: 2022-12-19

## 2022-12-19 VITALS
DIASTOLIC BLOOD PRESSURE: 60 MMHG | BODY MASS INDEX: 24.59 KG/M2 | SYSTOLIC BLOOD PRESSURE: 92 MMHG | HEIGHT: 63 IN | WEIGHT: 138.8 LBS

## 2022-12-19 DIAGNOSIS — Z80.3 FAMILY HISTORY OF BREAST CANCER IN FEMALE: ICD-10-CM

## 2022-12-19 DIAGNOSIS — N64.4 MASTALGIA IN FEMALE: Primary | ICD-10-CM

## 2022-12-19 NOTE — PATIENT INSTRUCTIONS
Monitor for/keep record of recurrence of breast pain  Notify me of recurrence  Will consider breast ultrasound  Limit caffeine intake  Breast self exam monthly after menses  Mammogram at age 37

## 2023-05-11 DIAGNOSIS — F41.9 ANXIETY AND DEPRESSION: ICD-10-CM

## 2023-05-11 DIAGNOSIS — F32.A ANXIETY AND DEPRESSION: ICD-10-CM

## 2023-06-30 ENCOUNTER — OFFICE VISIT (OUTPATIENT)
Dept: INTERNAL MEDICINE CLINIC | Facility: CLINIC | Age: 33
End: 2023-06-30
Payer: COMMERCIAL

## 2023-06-30 VITALS
WEIGHT: 144.2 LBS | HEIGHT: 63 IN | DIASTOLIC BLOOD PRESSURE: 68 MMHG | BODY MASS INDEX: 25.55 KG/M2 | HEART RATE: 66 BPM | SYSTOLIC BLOOD PRESSURE: 94 MMHG | RESPIRATION RATE: 16 BRPM | OXYGEN SATURATION: 97 %

## 2023-06-30 DIAGNOSIS — Z01.411 ENCOUNTER FOR GYNECOLOGICAL EXAMINATION WITH ABNORMAL FINDING: ICD-10-CM

## 2023-06-30 DIAGNOSIS — F41.9 ANXIETY AND DEPRESSION: ICD-10-CM

## 2023-06-30 DIAGNOSIS — Z13.1 SCREENING FOR DIABETES MELLITUS: ICD-10-CM

## 2023-06-30 DIAGNOSIS — Z13.6 SCREENING FOR CARDIOVASCULAR CONDITION: Primary | ICD-10-CM

## 2023-06-30 DIAGNOSIS — Z00.00 WELLNESS EXAMINATION: ICD-10-CM

## 2023-06-30 DIAGNOSIS — Z13.0 SCREENING, ANEMIA, DEFICIENCY, IRON: ICD-10-CM

## 2023-06-30 DIAGNOSIS — Z00.00 HEALTHCARE MAINTENANCE: ICD-10-CM

## 2023-06-30 DIAGNOSIS — F32.A ANXIETY AND DEPRESSION: ICD-10-CM

## 2023-06-30 NOTE — PROGRESS NOTES
[facility]    NAME: Padilla Sahu  AGE: 33 y o  SEX: female  : 1990     DATE: 2023     Assessment and Plan:     Problem List Items Addressed This Visit        Other    Wellness examination     Assessment and plan 1  Health maintenance annual wellness examination overall the patient is clinically stable and doing well, we encouraged the patient to follow a healthy and balanced diet  We recommend that the patient exercise routinely approximately 30 minutes 5 times per week   We have reviewed the patient's vaccines and have made recommendations for updates if necessary annual flu shot in the fall       We will be ordering screening laboratories which are age appropriate  Return to the office in 1 year    call if any problems  Screening, anemia, deficiency, iron    Relevant Orders    CBC (Includes Diff/Plt) (Refl)    Screening for cardiovascular condition - Primary    Relevant Orders    Comprehensive metabolic panel    Lipid Panel with Direct LDL reflex    Anxiety and depression     Clinically stable and doing well continue the current medical regiment will continue monitor  Continue Zoloft the patient will increase her exercises next 6 months and monitor the weight if the weight continues to go up she will notify me for consideration of change of Zoloft to Wellbutrin for now she is doing very well with the Zoloft her panic attacks are well controlled and she is tolerating well we have decided to observe her weight and she will increase her exercise           Relevant Medications    sertraline (ZOLOFT) 50 mg tablet   Other Visit Diagnoses     Screening for diabetes mellitus        Relevant Orders    Hemoglobin A1C    Encounter for gynecological examination with abnormal finding        Relevant Orders    Ambulatory Referral to Obstetrics / Gynecology    Healthcare maintenance        Relevant Orders    Hepatitis B surface antibody        RTO in 1 year call if any problems  Immunizations and preventive care screenings were discussed with patient today  Appropriate education was printed on patient's after visit summary  Counseling:  Exercise: the importance of regular exercise/physical activity was discussed  Recommend exercise 3-5 times per week for at least 30 minutes  BMI Counseling: Body mass index is 25 54 kg/m²  The BMI is above normal  Nutrition recommendations include decreasing portion sizes and moderation in carbohydrate intake  Exercise recommendations include exercising 3-5 times per week  Rationale for BMI follow-up plan is due to patient being overweight or obese  Return in about 1 year (around 6/30/2024)  Chief Complaint:     No chief complaint on file  History of Present Illness:     Adult Annual Physical   Patient here for a comprehensive physical exam  The patient reports no problems  Diet and Physical Activity  Diet/Nutrition: well balanced diet  Exercise: moderate cardiovascular exercise  Depression Screening  PHQ-2/9 Depression Screening    Little interest or pleasure in doing things: 0 - not at all  Feeling down, depressed, or hopeless: 0 - not at all       General Health  Sleep: gets 4-6 hours of sleep on average  Hearing: normal - bilateral   Vision: no vision problems  Dental: regular dental visits  /GYN Health  Last menstrual period: yesterday  Contraceptive method: vasectomy  History of STDs?: no      Review of Systems:     Review of Systems   Constitutional: Negative for activity change, appetite change and unexpected weight change  HENT: Negative for congestion  Eyes: Negative for visual disturbance  Respiratory: Negative for cough and shortness of breath  Cardiovascular: Negative for chest pain  Gastrointestinal: Negative for abdominal pain, diarrhea, nausea and vomiting  Neurological: Negative for dizziness, light-headedness and headaches  Hematological: Negative for adenopathy        Past Medical History:     Past Medical History:   Diagnosis Date   • Anemia    • Celiac disease 2007   • Fibroadenoma of breast, right     resolved 2012   • IUD contraception 11/23/2020    Paragard       Past Surgical History:     Past Surgical History:   Procedure Laterality Date   • [place]OP[place]   • WISDOM TOOTH EXTRACTION Bilateral 2011      Social History:     Social History     Socioeconomic History   • Marital status: /Civil Union     Spouse name: Mildred Ramirez   • Number of children: 1   • Years of education: None   • Highest education level: None   Occupational History   • Occupation: RN      Employer: [employer]   Tobacco Use   • Smoking status: Never   • Smokeless tobacco: Never   Vaping Use   • Vaping Use: Never used   Substance and Sexual Activity   • Alcohol use: No   • Drug use: No   • Sexual activity: Not Currently     Partners: Male   Other Topics Concern   • None   Social History Narrative    ** Merged History Encounter **               Social Determinants of Health     Financial Resource Strain: Not on file   Food Insecurity: Not on file   Transportation Needs: Not on file   Physical Activity: Not on file   Stress: Not on file   Social Connections: Not on file   Intimate Partner Violence: Not on file   Housing Stability: Not on file      Family History:     Family History   Problem Relation Age of Onset   • No Known Problems Mother    • No Known Problems Father    • Learning disabilities Brother    • Breast cancer Maternal Grandmother    • Diabetes Maternal Grandmother    • No Known Problems Maternal Grandfather    • Cancer Paternal Grandmother    • No Known Problems Daughter    • Cancer Maternal Aunt    • Breast cancer Maternal Aunt    • Mental retardation Maternal Uncle       Current Medications:     Current Outpatient Medications   Medication Sig Dispense Refill   • Melatonin 10 MG TABS Take 10 "mg by mouth     • sertraline (ZOLOFT) 50 mg tablet Take 1 tablet (50 mg total) by mouth daily 90 tablet 3     No current facility-administered medications for this visit  Allergies:     No Known Allergies   Physical Exam:     BP 94/68   Pulse 66   Resp 16   Ht 5' 3\" (1 6 m)   Wt 65 4 kg (144 lb 3 2 oz)   SpO2 97%   BMI 25 54 kg/m²     Physical Exam  Vitals and nursing note reviewed  Constitutional:       General: She is not in acute distress  Appearance: Normal appearance  She is well-developed  She is not ill-appearing, toxic-appearing or diaphoretic  HENT:      Head: Normocephalic and atraumatic  Right Ear: External ear normal       Left Ear: External ear normal       Nose: Nose normal    Eyes:      Pupils: Pupils are equal, round, and reactive to light  Cardiovascular:      Rate and Rhythm: Normal rate and regular rhythm  Heart sounds: Normal heart sounds  No murmur heard  Pulmonary:      Effort: Pulmonary effort is normal       Breath sounds: Normal breath sounds  Abdominal:      General: There is no distension  Palpations: Abdomen is soft  Tenderness: There is no abdominal tenderness  There is no guarding  Neurological:      Mental Status: She is alert            Maria E Ballesteros,    [facility] [place]  "

## 2023-07-01 NOTE — ASSESSMENT & PLAN NOTE
Assessment and plan 1  Health maintenance annual wellness examination overall the patient is clinically stable and doing well, we encouraged the patient to follow a healthy and balanced diet  We recommend that the patient exercise routinely approximately 30 minutes 5 times per week   We have reviewed the patient's vaccines and have made recommendations for updates if necessary annual flu shot in the fall       We will be ordering screening laboratories which are age appropriate  Return to the office in 1 year    call if any problems

## 2023-07-01 NOTE — ASSESSMENT & PLAN NOTE
Clinically stable and doing well continue the current medical regiment will continue monitor  Continue Zoloft the patient will increase her exercises next 6 months and monitor the weight if the weight continues to go up she will notify me for consideration of change of Zoloft to Wellbutrin for now she is doing very well with the Zoloft her panic attacks are well controlled and she is tolerating well we have decided to observe her weight and she will increase her exercise

## 2023-07-19 ENCOUNTER — APPOINTMENT (OUTPATIENT)
Dept: LAB | Age: 33
End: 2023-07-19
Payer: COMMERCIAL

## 2023-07-19 DIAGNOSIS — Z13.0 SCREENING, ANEMIA, DEFICIENCY, IRON: ICD-10-CM

## 2023-07-19 DIAGNOSIS — Z00.00 HEALTHCARE MAINTENANCE: ICD-10-CM

## 2023-07-19 DIAGNOSIS — Z13.6 SCREENING FOR CARDIOVASCULAR CONDITION: ICD-10-CM

## 2023-07-19 DIAGNOSIS — Z13.1 SCREENING FOR DIABETES MELLITUS: ICD-10-CM

## 2023-07-19 LAB
ALBUMIN SERPL BCP-MCNC: 4 G/DL (ref 3.5–5)
ALP SERPL-CCNC: 35 U/L (ref 46–116)
ALT SERPL W P-5'-P-CCNC: 29 U/L (ref 12–78)
ANION GAP SERPL CALCULATED.3IONS-SCNC: 6 MMOL/L
AST SERPL W P-5'-P-CCNC: 28 U/L (ref 5–45)
BASOPHILS # BLD AUTO: 0.02 THOUSANDS/ÂΜL (ref 0–0.1)
BASOPHILS NFR BLD AUTO: 0 % (ref 0–1)
BILIRUB SERPL-MCNC: 0.72 MG/DL (ref 0.2–1)
BUN SERPL-MCNC: 20 MG/DL (ref 5–25)
CALCIUM SERPL-MCNC: 8.9 MG/DL (ref 8.3–10.1)
CHLORIDE SERPL-SCNC: 111 MMOL/L (ref 96–108)
CHOLEST SERPL-MCNC: 151 MG/DL
CO2 SERPL-SCNC: 20 MMOL/L (ref 21–32)
CREAT SERPL-MCNC: 0.71 MG/DL (ref 0.6–1.3)
EOSINOPHIL # BLD AUTO: 0.07 THOUSAND/ÂΜL (ref 0–0.61)
EOSINOPHIL NFR BLD AUTO: 2 % (ref 0–6)
ERYTHROCYTE [DISTWIDTH] IN BLOOD BY AUTOMATED COUNT: 12.9 % (ref 11.6–15.1)
EST. AVERAGE GLUCOSE BLD GHB EST-MCNC: 94 MG/DL
GFR SERPL CREATININE-BSD FRML MDRD: 113 ML/MIN/1.73SQ M
GLUCOSE P FAST SERPL-MCNC: 86 MG/DL (ref 65–99)
HBA1C MFR BLD: 4.9 %
HBV SURFACE AB SER-ACNC: 136 MIU/ML
HCT VFR BLD AUTO: 41.4 % (ref 34.8–46.1)
HDLC SERPL-MCNC: 66 MG/DL
HGB BLD-MCNC: 13.3 G/DL (ref 11.5–15.4)
IMM GRANULOCYTES # BLD AUTO: 0.01 THOUSAND/UL (ref 0–0.2)
IMM GRANULOCYTES NFR BLD AUTO: 0 % (ref 0–2)
LDLC SERPL CALC-MCNC: 71 MG/DL (ref 0–100)
LYMPHOCYTES # BLD AUTO: 1.8 THOUSANDS/ÂΜL (ref 0.6–4.47)
LYMPHOCYTES NFR BLD AUTO: 38 % (ref 14–44)
MCH RBC QN AUTO: 28.2 PG (ref 26.8–34.3)
MCHC RBC AUTO-ENTMCNC: 32.1 G/DL (ref 31.4–37.4)
MCV RBC AUTO: 88 FL (ref 82–98)
MONOCYTES # BLD AUTO: 0.54 THOUSAND/ÂΜL (ref 0.17–1.22)
MONOCYTES NFR BLD AUTO: 12 % (ref 4–12)
NEUTROPHILS # BLD AUTO: 2.26 THOUSANDS/ÂΜL (ref 1.85–7.62)
NEUTS SEG NFR BLD AUTO: 48 % (ref 43–75)
NRBC BLD AUTO-RTO: 0 /100 WBCS
PLATELET # BLD AUTO: 265 THOUSANDS/UL (ref 149–390)
PMV BLD AUTO: 10.9 FL (ref 8.9–12.7)
POTASSIUM SERPL-SCNC: 4.1 MMOL/L (ref 3.5–5.3)
PROT SERPL-MCNC: 7.4 G/DL (ref 6.4–8.4)
RBC # BLD AUTO: 4.72 MILLION/UL (ref 3.81–5.12)
SODIUM SERPL-SCNC: 137 MMOL/L (ref 135–147)
TRIGL SERPL-MCNC: 68 MG/DL
WBC # BLD AUTO: 4.7 THOUSAND/UL (ref 4.31–10.16)

## 2023-07-19 PROCEDURE — 83036 HEMOGLOBIN GLYCOSYLATED A1C: CPT

## 2023-07-19 PROCEDURE — 80061 LIPID PANEL: CPT

## 2023-07-19 PROCEDURE — 36415 COLL VENOUS BLD VENIPUNCTURE: CPT

## 2023-07-19 PROCEDURE — 85025 COMPLETE CBC W/AUTO DIFF WBC: CPT

## 2023-07-19 PROCEDURE — 86706 HEP B SURFACE ANTIBODY: CPT

## 2023-07-19 PROCEDURE — 80053 COMPREHEN METABOLIC PANEL: CPT

## 2023-08-10 ENCOUNTER — PATIENT MESSAGE (OUTPATIENT)
Dept: INTERNAL MEDICINE CLINIC | Facility: CLINIC | Age: 33
End: 2023-08-10

## 2023-08-14 NOTE — PATIENT COMMUNICATION
[address]    PCP: would recommend reducing to 25mg/day x2 weeks then stopping if patient does not have any withdrawal signs/symptoms. ___________________________________________________________________    Communication with patient: No, only completed chart review    Reason for documentation: review for SSRI taper    Findings: started on sertraline 10/2021; increased from 25mg/day to 50mg/day ~11/2021. No previous medication prior to starting sertraline.        Pharmacist Tracking Tool  Reason For Outreach: Embedded Pharmacist    Demographics:  Intervention Method: Chart Review  Type of Intervention: New  Topics Addressed: Anxiety  Pharmacologic Interventions: Medication Discontinuation  Non-Pharmacologic Interventions: N/A  Time:  Direct Patient Care: 0 mins   Care Coordination: 5 mins  Recommendation Recipient: N/A  Outcome: N/A

## 2024-01-03 ENCOUNTER — TELEPHONE (OUTPATIENT)
Age: 34
End: 2024-01-03

## 2024-01-03 ENCOUNTER — OFFICE VISIT (OUTPATIENT)
Dept: PODIATRY | Facility: CLINIC | Age: 34
End: 2024-01-03
Payer: COMMERCIAL

## 2024-01-03 VITALS
SYSTOLIC BLOOD PRESSURE: 99 MMHG | HEART RATE: 71 BPM | HEIGHT: 63 IN | DIASTOLIC BLOOD PRESSURE: 63 MMHG | WEIGHT: 153 LBS | BODY MASS INDEX: 27.11 KG/M2

## 2024-01-03 DIAGNOSIS — M21.611 BUNION, RIGHT: Primary | ICD-10-CM

## 2024-01-03 PROCEDURE — 99203 OFFICE O/P NEW LOW 30 MIN: CPT | Performed by: PODIATRIST

## 2024-01-03 NOTE — TELEPHONE ENCOUNTER
Dr fishman referred pt to dr durbin. She is adriana for a sx consult on 01/08/24 at [place] with him.

## 2024-01-03 NOTE — TELEPHONE ENCOUNTER
Caller: Kinsey Lee    Doctor/Office: Dr. Montague/[facility]    #: [phone]    Escalation: Surgery/Wants to schedule surgery-Dr. Montague is leaving so she is not sure which Dr he wants to refer her to for surgery, so please ask Dr. Montague and call pt back to start the process. Thanks

## 2024-01-04 NOTE — PROGRESS NOTES
"This patient was seen on 1/3/24.    My role is Foot , Ankle, and Wound Specialist    SUBJECTIVE    Chief Complaint:  Bunion Right foot     Patient ID: Kinsey Lee is a 33 y.o. female.    Kinsey is here for the first time with a cc of a painful bunion on the Right foot. She's very active (hiking, marathons) and it's interfering in activities and comfortable shoewear. 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         The following portions of the patient's history were reviewed and updated as appropriate: allergies, current medications, past family history, past medical history, past social history, past surgical history and problem list.    Review of Systems   Constitutional: Negative.    Respiratory: Negative.     Musculoskeletal:  Positive for arthralgias, gait problem and joint swelling.         OBJECTIVE      BP 99/63   Pulse 71   Ht 5' 3\" (1.6 m)   Wt 69.4 kg (153 lb)   BMI 27.10 kg/m²     Foot/Ankle Musculoskeletal Exam    General    Neurological: alert  General additional comments:  I note muscle function of the anterior, posterior, evertor and invertor muscle groups of the lower leg are intact and normal. I note ROM of the ankle joint, subtalar joint, Choparts and Lisfranc's joint complexes and metatarsophalangeal joints are WNL without crepitus nor restrictions bilaterally. I note Right greater than Left moderate bunion formation with HAV.       Physical Exam  Vitals and nursing note reviewed.   Constitutional:       General: She is not in acute distress.     Appearance: Normal appearance. She is not ill-appearing, toxic-appearing or diaphoretic.   HENT:      Head: Normocephalic.   Pulmonary:      Effort: Pulmonary effort is normal.   Musculoskeletal:         General: Swelling, tenderness and deformity present.      Comments:  I note muscle function of the anterior, posterior, evertor and invertor muscle groups of the lower leg are intact and normal. I note ROM of the ankle joint, subtalar joint, Choparts and " Lisfranc's joint complexes and metatarsophalangeal joints are WNL without crepitus nor restrictions bilaterally. I note Right greater than Left moderate bunion formation with HAV.   Skin:     General: Skin is warm.      Capillary Refill: Capillary refill takes less than 2 seconds.   Neurological:      Mental Status: She is alert.             ASSESSMENT     Diagnoses and all orders for this visit:    Bunion, right  -     X-ray foot right 3+ views; Future         Problem List Items Addressed This Visit          Musculoskeletal and Integument    Bunion, right - Primary    Relevant Orders    X-ray foot right 3+ views (Completed)           PLAN    Xrays ordered; my wet read is notable for moderate verging on severe bunion formation (SHAYE in the range of 15). She has some underlying mild metadductus.     I feel she's a candidate for a lapidus-type bunionectomy. She wants to think a bit about this and I did review the pre- and post-op course of a typical Lapidus.

## 2024-01-10 ENCOUNTER — OFFICE VISIT (OUTPATIENT)
Dept: PODIATRY | Facility: CLINIC | Age: 34
End: 2024-01-10
Payer: COMMERCIAL

## 2024-01-10 VITALS
HEIGHT: 63 IN | BODY MASS INDEX: 26.75 KG/M2 | HEART RATE: 67 BPM | DIASTOLIC BLOOD PRESSURE: 69 MMHG | WEIGHT: 151 LBS | SYSTOLIC BLOOD PRESSURE: 108 MMHG

## 2024-01-10 DIAGNOSIS — M20.11 HALLUX ABDUCTO VALGUS, RIGHT: Primary | ICD-10-CM

## 2024-01-10 DIAGNOSIS — Z01.818 PREOP TESTING: ICD-10-CM

## 2024-01-10 PROCEDURE — 99214 OFFICE O/P EST MOD 30 MIN: CPT | Performed by: PODIATRIST

## 2024-01-10 NOTE — PROGRESS NOTES
Assessment/Plan:         Diagnoses and all orders for this visit:    Hallux abducto valgus, right  -     Comprehensive metabolic panel; Future  -     CBC and differential; Future  -     Case request operating room: BUNIONECTOMY LAPIPLASTY right foot; Standing  -     Case request operating room: BUNIONECTOMY LAPIPLASTY right foot    Preop testing  -     Comprehensive metabolic panel; Future  -     CBC and differential; Future    Other orders  -     Incentive spirometry; Standing  -     Insert and maintain IV line; Standing  -     Void On-Call to O.R.; Standing  -     Urine Pregnancy (Holding Area Only) POCT; Standing  -     ceFAZolin (ANCEF) 1,000 mg in dextrose 5 % 100 mL IVPB  -     Place sequential compression device; Standing      Diagnosis and options discussed with patient  Patient agreeable to the plan as stated below  Reviewed patient's initial visit with Dr. Montague  Reviewed patient's x-ray, see my personal read below    Patient has failed conservative care for moderate to severe hallux abductovalgus.  She is getting daily pain and this is impacting her quality of life.  Recommend Lapa plasty procedure.    Consent was signed for right lapidus bunionectomy    Surgical procedure and recovery discussed as can best be predicted.  Alternatives, risks, complications covered with the patient.    Appropriate postop medication discussed, educated patient on narcotic medication and its risks.     Patient will be sent for preoperative testing and clearance    Patient doen not need DVT prophylaxis for this procedure      XRay 3 views of the right foot personally read by Dr. Carlos in office today and discussed with patient:    IM angle: 16  Sesamoid position: 5  Elevatus: moderate  Hallux abductus interphalangeus: moderate  Degenerative changes: none        Subjective:      Patient ID: Kinsey Lee is a 33 y.o. female.    Patient presents with chronic bunion deformity.  She has had it since she was a  "teenager but over the past year due to the pain and the size of the deformity has gotten worse.  She has tried numerous different types of sneakers.  She works and an emergency room as a nurse and this is causing significant pain throughout the day as well as impacting her ability to exercise.    She consulted with Dr. Montague where she got x-rays.  He is leaving the area and referred her here for further care.  She has tried numerous conservative methods to reduce pain in her foot and feels surgery might be her only option.        The following portions of the patient's history were reviewed and updated as appropriate: allergies, current medications, past family history, past medical history, past social history, past surgical history, and problem list.    Review of Systems    As stated in HPI, otherwise normal      Objective:      /69 (BP Location: Right arm, Patient Position: Sitting, Cuff Size: Standard)   Pulse 67   Ht 5' 3\" (1.6 m)   Wt 68.5 kg (151 lb)   BMI 26.75 kg/m²          Physical Exam  Vitals reviewed.   Constitutional:       Appearance: She is not ill-appearing or diaphoretic.   Cardiovascular:      Rate and Rhythm: Normal rate.      Pulses: Normal pulses.   Pulmonary:      Effort: Pulmonary effort is normal. No respiratory distress.   Skin:     Capillary Refill: Capillary refill takes less than 2 seconds.      Findings: No erythema or rash.   Neurological:      Mental Status: She is alert and oriented to person, place, and time.      Sensory: No sensory deficit.      Gait: Gait normal.   Psychiatric:         Mood and Affect: Mood normal.      Specific right foot exam shows neurovascular status intact.  Palpable pedal pulses.  Normal ankle range of motion.  No calf pain.    Patient has moderate to severe hallux abductovalgus.  The hallux is tracking laterally.  No crepitus with range of motion.  Significant hypermobility of the first ray noted.  No sesamoid pain on exam today.  Negative " Lachman test.  Mild pes planus on stance.

## 2024-02-06 ENCOUNTER — APPOINTMENT (OUTPATIENT)
Dept: LAB | Age: 34
End: 2024-02-06
Payer: COMMERCIAL

## 2024-02-06 DIAGNOSIS — M20.11 HALLUX ABDUCTO VALGUS, RIGHT: ICD-10-CM

## 2024-02-06 DIAGNOSIS — Z01.818 PREOP TESTING: ICD-10-CM

## 2024-02-06 LAB
ALBUMIN SERPL BCP-MCNC: 4.1 G/DL (ref 3.5–5)
ALP SERPL-CCNC: 27 U/L (ref 34–104)
ALT SERPL W P-5'-P-CCNC: 41 U/L (ref 7–52)
ANION GAP SERPL CALCULATED.3IONS-SCNC: 7 MMOL/L
AST SERPL W P-5'-P-CCNC: 37 U/L (ref 13–39)
BASOPHILS # BLD AUTO: 0.02 THOUSANDS/ÂΜL (ref 0–0.1)
BASOPHILS NFR BLD AUTO: 1 % (ref 0–1)
BILIRUB SERPL-MCNC: 0.49 MG/DL (ref 0.2–1)
BUN SERPL-MCNC: 15 MG/DL (ref 5–25)
CALCIUM SERPL-MCNC: 8.8 MG/DL (ref 8.4–10.2)
CHLORIDE SERPL-SCNC: 106 MMOL/L (ref 96–108)
CO2 SERPL-SCNC: 25 MMOL/L (ref 21–32)
CREAT SERPL-MCNC: 0.58 MG/DL (ref 0.6–1.3)
EOSINOPHIL # BLD AUTO: 0.1 THOUSAND/ÂΜL (ref 0–0.61)
EOSINOPHIL NFR BLD AUTO: 2 % (ref 0–6)
ERYTHROCYTE [DISTWIDTH] IN BLOOD BY AUTOMATED COUNT: 13.2 % (ref 11.6–15.1)
GFR SERPL CREATININE-BSD FRML MDRD: 122 ML/MIN/1.73SQ M
GLUCOSE P FAST SERPL-MCNC: 80 MG/DL (ref 65–99)
HCT VFR BLD AUTO: 38.9 % (ref 34.8–46.1)
HGB BLD-MCNC: 12.3 G/DL (ref 11.5–15.4)
IMM GRANULOCYTES # BLD AUTO: 0.01 THOUSAND/UL (ref 0–0.2)
IMM GRANULOCYTES NFR BLD AUTO: 0 % (ref 0–2)
LYMPHOCYTES # BLD AUTO: 1.55 THOUSANDS/ÂΜL (ref 0.6–4.47)
LYMPHOCYTES NFR BLD AUTO: 36 % (ref 14–44)
MCH RBC QN AUTO: 27.6 PG (ref 26.8–34.3)
MCHC RBC AUTO-ENTMCNC: 31.6 G/DL (ref 31.4–37.4)
MCV RBC AUTO: 87 FL (ref 82–98)
MONOCYTES # BLD AUTO: 0.45 THOUSAND/ÂΜL (ref 0.17–1.22)
MONOCYTES NFR BLD AUTO: 10 % (ref 4–12)
NEUTROPHILS # BLD AUTO: 2.18 THOUSANDS/ÂΜL (ref 1.85–7.62)
NEUTS SEG NFR BLD AUTO: 51 % (ref 43–75)
NRBC BLD AUTO-RTO: 0 /100 WBCS
PLATELET # BLD AUTO: 237 THOUSANDS/UL (ref 149–390)
PMV BLD AUTO: 11.1 FL (ref 8.9–12.7)
POTASSIUM SERPL-SCNC: 3.9 MMOL/L (ref 3.5–5.3)
PROT SERPL-MCNC: 6.5 G/DL (ref 6.4–8.4)
RBC # BLD AUTO: 4.45 MILLION/UL (ref 3.81–5.12)
SODIUM SERPL-SCNC: 138 MMOL/L (ref 135–147)
WBC # BLD AUTO: 4.31 THOUSAND/UL (ref 4.31–10.16)

## 2024-02-06 PROCEDURE — 85025 COMPLETE CBC W/AUTO DIFF WBC: CPT

## 2024-02-06 PROCEDURE — 36415 COLL VENOUS BLD VENIPUNCTURE: CPT

## 2024-02-06 PROCEDURE — 80053 COMPREHEN METABOLIC PANEL: CPT

## 2024-02-07 NOTE — PRE-PROCEDURE INSTRUCTIONS
No outpatient medications have been marked as taking for the 2/16/24 encounter (Hospital Encounter).   Medication instructions for day surgery reviewed. Please use only a sip of water to take your instructed medications. Avoid all over the counter vitamins, supplements and NSAIDS for one week prior to surgery per anesthesia guidelines. Tylenol is ok to take as needed.     You will receive a call one business day prior to surgery with an arrival time and hospital directions. If your surgery is scheduled on a Monday, the hospital will be calling you on the Friday prior to your surgery. If you have not heard from anyone by 8pm, please call the hospital supervisor through the hospital  at [phone]. ([place] [phone] or [place] [phone]).    Do not eat or drink anything after midnight the night before your surgery, including candy, mints, lifesavers, or chewing gum. Do not drink alcohol 24hrs before your surgery. Try not to smoke at least 24hrs before your surgery.       Follow the pre surgery showering instructions as listed in the “My Surgical Experience Booklet” or otherwise provided by your surgeon's office. Do not use a blade to shave the surgical area 1 week before surgery. It is okay to use a clean electric clippers up to 24 hours before surgery. Do not apply any lotions, creams, including makeup, cologne, deodorant, or perfumes after showering on the day of your surgery. Do not use dry shampoo, hair spray, hair gel, or any type of hair products.     No contact lenses, eye make-up, or artificial eyelashes. Remove nail polish, including gel polish, and any artificial, gel, or acrylic nails if possible. Remove all jewelry including rings and body piercing jewelry.     Wear causal clothing that is easy to take on and off. Consider your type of surgery.    Keep any valuables, jewelry, piercings at home. Please bring any specially ordered equipment (sling, braces) if indicated.    Arrange for a  responsible person to drive you to and from the hospital on the day of your surgery. Visitor Guidelines discussed.     Call the surgeon's office with any new illnesses, exposures, or additional questions prior to surgery.    Please reference your “My Surgical Experience Booklet” for additional information to prepare for your upcoming surgery.

## 2024-02-08 ENCOUNTER — CONSULT (OUTPATIENT)
Dept: INTERNAL MEDICINE CLINIC | Facility: CLINIC | Age: 34
End: 2024-02-08
Payer: COMMERCIAL

## 2024-02-08 VITALS
SYSTOLIC BLOOD PRESSURE: 112 MMHG | BODY MASS INDEX: 26.82 KG/M2 | HEIGHT: 63 IN | DIASTOLIC BLOOD PRESSURE: 70 MMHG | WEIGHT: 151.4 LBS

## 2024-02-08 DIAGNOSIS — Z01.818 PREOP EXAMINATION: Primary | ICD-10-CM

## 2024-02-08 DIAGNOSIS — M21.611 BUNION, RIGHT: ICD-10-CM

## 2024-02-08 PROCEDURE — 93000 ELECTROCARDIOGRAM COMPLETE: CPT | Performed by: INTERNAL MEDICINE

## 2024-02-08 PROCEDURE — 99243 OFF/OP CNSLTJ NEW/EST LOW 30: CPT | Performed by: INTERNAL MEDICINE

## 2024-02-08 NOTE — LETTER
February 8, 2024     Galileo Carlos, DPDASH  [address] [facility] [address]    Patient: Kinsey Lee   YOB: 1990   Date of Visit: 2/8/2024       Dear Dr. Carlos:    Thank you for referring Kinsey Lee to me for evaluation. Below are my notes for this consultation.    If you have questions, please do not hesitate to call me. I look forward to following your patient along with you.         Sincerely,        Sim Leach MD        CC: No Recipients    Sim Leach MD  2/8/2024  9:22 AM  Incomplete  Presurgical Evaluation    Subjective:      Patient ID: Kinsey Lee is a 33 y.o. female.    Chief Complaint   Patient presents with   • Pre-op Exam       HPI    The following portions of the patient's history were reviewed and updated as appropriate: allergies, current medications, past family history, past medical history, past social history, past surgical history, and problem list.    Procedure date: 2/16/24    Surgeon:  Dr. Carlos   Planned procedure:  Bunionectomy Lapiplasty, Right   Diagnosis for procedure:  Hallux abducto valgus, right     The patient is seen for perioperative risk stratification.  Patient reports no symptoms of chest pain at rest or with exertion, dyspnea at rest or with exertion, PND, lower extremity edema, claudication or palpitations.  Patient has no history of ischemic heart disease, CHF or diabetes.  Patient reports no history of undergoing a cardiac cath or coronary revascularization percutaneously or surgically.  Patient denies any history of easy bruising, profuse bleeding from minor injuries or family history of bleeding disorder.    Review of Systems All other systems negative except for pertinent findings noted in HPI.         No current outpatient medications on file.     No current facility-administered medications for this visit.       Allergies on file:   Patient has no known allergies.    Past Medical History:  "  Diagnosis Date   • Anemia    • Anxiety    • Celiac disease 2007   • Fibroadenoma of breast, right     resolved 2012   • IUD contraception 11/23/2020    Paragard        Past Surgical History:   Procedure Laterality Date   • [place]OP[place]   • WISDOM TOOTH EXTRACTION Bilateral 2011       Family History   Problem Relation Age of Onset   • No Known Problems Mother    • No Known Problems Father    • Learning disabilities Brother    • Breast cancer Maternal Grandmother    • Diabetes Maternal Grandmother    • No Known Problems Maternal Grandfather    • Cancer Paternal Grandmother    • No Known Problems Daughter    • Cancer Maternal Aunt    • Breast cancer Maternal Aunt    • Mental retardation Maternal Uncle    • Breast cancer Maternal Aunt        Social History     Tobacco Use   • Smoking status: Never   • Smokeless tobacco: Never   Vaping Use   • Vaping status: Never Used   Substance Use Topics   • Alcohol use: No   • Drug use: No       Objective:    Vitals:    02/08/24 0843   BP: 112/70   BP Location: Left arm   Patient Position: Sitting   Cuff Size: Standard   Weight: 68.7 kg (151 lb 6.4 oz)   Height: 5' 3\" (1.6 m)       BP Readings from Last 3 Encounters:   02/08/24 112/70   01/10/24 108/69   01/03/24 99/63        Wt Readings from Last 3 Encounters:   02/08/24 68.7 kg (151 lb 6.4 oz)   01/10/24 68.5 kg (151 lb)   01/03/24 69.4 kg (153 lb)        Physical Exam     General: NAD, Alert and oriented x3   HEENT: NCAT, EOMI, normal conjunctiva  Cardiovascular: RRR, normal S1 and S2, no m/r/g  Pulmonary: Normal respiratory effort, no wheezes, rales or rhonchi  GI: Soft, nontender, nondistended, normoactive bowel sounds  Musculoskeletal: Normal bulk and tone  Neuro: Non-focal, ambulating without difficulty, non-antalgic gait  Extremities: No lower extremity edema  Skin: Normal skin color, no rashes   Psychiatric: Normal mood and affect      Preop labs/testing available and reviewed: yes    eGFR "   Date Value Ref Range Status   2024 122 ml/min/1.73sq m Final     WBC   Date Value Ref Range Status   2024 4.31 4.31 - 10.16 Thousand/uL Final     Hemoglobin   Date Value Ref Range Status   2024 12.3 11.5 - 15.4 g/dL Final     Hematocrit   Date Value Ref Range Status   2024 38.9 34.8 - 46.1 % Final     Platelets   Date Value Ref Range Status   2024 237 149 - 390 Thousands/uL Final          RCRI  High Risk surgery?         1 Point  CAD History:         1 Point   MI; Positive Stress Test; CP due to Mi;  Nitrate Usage to control Angina; Pathologic Q wave on EKG  CHF Active:         1 Point   Pulm Edema; Paroxysmal Nocturnal Dyspnea;  Bibasilar Rales (crackles);S3; CHF on CXR  Cerebrovascular Disease (TIA or CVA):     1 Point  DM on Insulin:        1 Point  Serum Creat >2.0 mg/dl:       1 Point          Total Points: 0     Scorin: Class I, Very Low Risk (0.4%)     1: Class II, Low risk (0.9%)     2: Class III Moderate (6.6%)     3: Class IV High (>11%)        Assessment/Plan:    Patient is medically optimized (cleared) for the planned procedure.    1. Preop examination  Assessment & Plan:  -Patient is of low cardiac risk for the proposed procedure.  Her preoperative labs and EKG have been reviewed.  There is no indication for additional cardiac workup.  Patient may proceed with surgery as planned.    Orders:  -     POCT ECG    2. Bunion, right  Assessment & Plan:  -Bunionectomy lapiplasty proposed by Podiatry

## 2024-02-08 NOTE — H&P (VIEW-ONLY)
Presurgical Evaluation    Subjective:      Patient ID: Kinsey Lee is a 33 y.o. female.    Chief Complaint   Patient presents with   • Pre-op Exam       HPI    The following portions of the patient's history were reviewed and updated as appropriate: allergies, current medications, past family history, past medical history, past social history, past surgical history, and problem list.    Procedure date: 2/16/24    Surgeon:  Dr. Carlos   Planned procedure:  Bunionectomy Lapiplasty, Right   Diagnosis for procedure:  Hallux abducto valgus, right     The patient is seen for perioperative risk stratification.  Patient reports no symptoms of chest pain at rest or with exertion, dyspnea at rest or with exertion, PND, lower extremity edema, claudication or palpitations.  Patient has no history of ischemic heart disease, CHF or diabetes.  Patient reports no history of undergoing a cardiac cath or coronary revascularization percutaneously or surgically.  Patient denies any history of easy bruising, profuse bleeding from minor injuries or family history of bleeding disorder.    Review of Systems  All other systems negative except for pertinent findings noted in HPI.         No current outpatient medications on file.     No current facility-administered medications for this visit.       Allergies on file:   Patient has no known allergies.    Past Medical History:   Diagnosis Date   • Anemia    • Anxiety    • Celiac disease 2007   • Fibroadenoma of breast, right     resolved 2012   • IUD contraception 11/23/2020    Paragard        Past Surgical History:   Procedure Laterality Date   • ESOPHAGOGASTRODUODENOSCOPY  2014    [place]   • WISDOM TOOTH EXTRACTION Bilateral 2011       Family History   Problem Relation Age of Onset   • No Known Problems Mother    • No Known Problems Father    • Learning disabilities Brother    • Breast cancer Maternal Grandmother    • Diabetes Maternal Grandmother    • No Known Problems Maternal  "Grandfather    • Cancer Paternal Grandmother    • No Known Problems Daughter    • Cancer Maternal Aunt    • Breast cancer Maternal Aunt    • Mental retardation Maternal Uncle    • Breast cancer Maternal Aunt        Social History     Tobacco Use   • Smoking status: Never   • Smokeless tobacco: Never   Vaping Use   • Vaping status: Never Used   Substance Use Topics   • Alcohol use: No   • Drug use: No       Objective:    Vitals:    02/08/24 0843   BP: 112/70   BP Location: Left arm   Patient Position: Sitting   Cuff Size: Standard   Weight: 68.7 kg (151 lb 6.4 oz)   Height: 5' 3\" (1.6 m)       BP Readings from Last 3 Encounters:   02/08/24 112/70   01/10/24 108/69   01/03/24 99/63        Wt Readings from Last 3 Encounters:   02/08/24 68.7 kg (151 lb 6.4 oz)   01/10/24 68.5 kg (151 lb)   01/03/24 69.4 kg (153 lb)        Physical Exam      General: NAD, Alert and oriented x3   HEENT: NCAT, EOMI, normal conjunctiva  Cardiovascular: RRR, normal S1 and S2, no m/r/g  Pulmonary: Normal respiratory effort, no wheezes, rales or rhonchi  GI: Soft, nontender, nondistended, normoactive bowel sounds  Musculoskeletal: Normal bulk and tone  Neuro: Non-focal, ambulating without difficulty, non-antalgic gait  Extremities: No lower extremity edema  Skin: Normal skin color, no rashes   Psychiatric: Normal mood and affect      Preop labs/testing available and reviewed: yes    eGFR   Date Value Ref Range Status   02/06/2024 122 ml/min/1.73sq m Final     WBC   Date Value Ref Range Status   02/06/2024 4.31 4.31 - 10.16 Thousand/uL Final     Hemoglobin   Date Value Ref Range Status   02/06/2024 12.3 11.5 - 15.4 g/dL Final     Hematocrit   Date Value Ref Range Status   02/06/2024 38.9 34.8 - 46.1 % Final     Platelets   Date Value Ref Range Status   02/06/2024 237 149 - 390 Thousands/uL Final          RCRI  High Risk surgery?         1 Point  CAD History:         1 Point   MI; Positive Stress Test; CP due to Mi;  Nitrate Usage to control " Angina; Pathologic Q wave on EKG  CHF Active:         1 Point   Pulm Edema; Paroxysmal Nocturnal Dyspnea;  Bibasilar Rales (crackles);S3; CHF on CXR  Cerebrovascular Disease (TIA or CVA):     1 Point  DM on Insulin:        1 Point  Serum Creat >2.0 mg/dl:       1 Point          Total Points: 0     Scorin: Class I, Very Low Risk (0.4%)     1: Class II, Low risk (0.9%)     2: Class III Moderate (6.6%)     3: Class IV High (>11%)        Assessment/Plan:    Patient is medically optimized (cleared) for the planned procedure.    1. Preop examination  Assessment & Plan:  -Patient is of low cardiac risk for the proposed procedure.  Her preoperative labs and EKG have been reviewed.  There is no indication for additional cardiac workup.  Patient may proceed with surgery as planned.    Orders:  -     POCT ECG    2. Bunion, right  Assessment & Plan:  -Bunionectomy lapiplasty proposed by Podiatry

## 2024-02-08 NOTE — ASSESSMENT & PLAN NOTE
-Patient is of low cardiac risk for the proposed procedure.  Her preoperative labs and EKG have been reviewed.  There is no indication for additional cardiac workup.  Patient may proceed with surgery as planned.

## 2024-02-15 ENCOUNTER — ANESTHESIA EVENT (OUTPATIENT)
Dept: PERIOP | Facility: HOSPITAL | Age: 34
End: 2024-02-15
Payer: COMMERCIAL

## 2024-02-16 ENCOUNTER — ANESTHESIA (OUTPATIENT)
Dept: PERIOP | Facility: HOSPITAL | Age: 34
End: 2024-02-16
Payer: COMMERCIAL

## 2024-02-16 ENCOUNTER — HOSPITAL ENCOUNTER (OUTPATIENT)
Dept: RADIOLOGY | Facility: HOSPITAL | Age: 34
Setting detail: OUTPATIENT SURGERY
Discharge: HOME | End: 2024-02-16
Payer: COMMERCIAL

## 2024-02-16 ENCOUNTER — HOSPITAL ENCOUNTER (OUTPATIENT)
Facility: HOSPITAL | Age: 34
Setting detail: OUTPATIENT SURGERY
Discharge: HOME | End: 2024-02-16
Attending: PODIATRIST | Admitting: PODIATRIST
Payer: COMMERCIAL

## 2024-02-16 VITALS
OXYGEN SATURATION: 98 % | HEIGHT: 64 IN | SYSTOLIC BLOOD PRESSURE: 101 MMHG | BODY MASS INDEX: 25.56 KG/M2 | TEMPERATURE: 98.9 F | RESPIRATION RATE: 18 BRPM | WEIGHT: 149.69 LBS | DIASTOLIC BLOOD PRESSURE: 61 MMHG | HEART RATE: 53 BPM

## 2024-02-16 DIAGNOSIS — M20.11 HALLUX ABDUCTO VALGUS, RIGHT: ICD-10-CM

## 2024-02-16 DIAGNOSIS — G89.18 POST-OP PAIN: Primary | ICD-10-CM

## 2024-02-16 LAB
EXT PREGNANCY TEST URINE: NEGATIVE
EXT. CONTROL: NORMAL

## 2024-02-16 PROCEDURE — C1713 ANCHOR/SCREW BN/BN,TIS/BN: HCPCS | Performed by: PODIATRIST

## 2024-02-16 PROCEDURE — 73620 X-RAY EXAM OF FOOT: CPT

## 2024-02-16 PROCEDURE — 28297 COR HLX VLGS JT ARTHRD: CPT | Performed by: PODIATRIST

## 2024-02-16 PROCEDURE — 99024 POSTOP FOLLOW-UP VISIT: CPT | Performed by: PODIATRIST

## 2024-02-16 PROCEDURE — 81025 URINE PREGNANCY TEST: CPT | Performed by: PODIATRIST

## 2024-02-16 DEVICE — 4.0MM FULLY THREADED SCREW - LONG (38MM/42MM)
Type: IMPLANTABLE DEVICE | Site: FOOT | Status: FUNCTIONAL
Brand: LAPIPLASTY® 4.0MM FULLY THREADED SCREW

## 2024-02-16 DEVICE — RAPID COMPRESSION IMPLANTS
Type: IMPLANTABLE DEVICE | Site: FOOT | Status: FUNCTIONAL
Brand: LAPIPLASTY SPEEDPLATE

## 2024-02-16 RX ORDER — DEXAMETHASONE SODIUM PHOSPHATE 10 MG/ML
INJECTION, SOLUTION INTRAMUSCULAR; INTRAVENOUS AS NEEDED
Status: DISCONTINUED | OUTPATIENT
Start: 2024-02-16 | End: 2024-02-16

## 2024-02-16 RX ORDER — PROPOFOL 10 MG/ML
INJECTION, EMULSION INTRAVENOUS AS NEEDED
Status: DISCONTINUED | OUTPATIENT
Start: 2024-02-16 | End: 2024-02-16

## 2024-02-16 RX ORDER — FENTANYL CITRATE 50 UG/ML
INJECTION, SOLUTION INTRAMUSCULAR; INTRAVENOUS AS NEEDED
Status: DISCONTINUED | OUTPATIENT
Start: 2024-02-16 | End: 2024-02-16

## 2024-02-16 RX ORDER — SODIUM CHLORIDE 9 MG/ML
125 INJECTION, SOLUTION INTRAVENOUS CONTINUOUS
Status: DISCONTINUED | OUTPATIENT
Start: 2024-02-16 | End: 2024-02-16 | Stop reason: HOSPADM

## 2024-02-16 RX ORDER — ONDANSETRON 2 MG/ML
INJECTION INTRAMUSCULAR; INTRAVENOUS AS NEEDED
Status: DISCONTINUED | OUTPATIENT
Start: 2024-02-16 | End: 2024-02-16

## 2024-02-16 RX ORDER — OXYCODONE HYDROCHLORIDE AND ACETAMINOPHEN 5; 325 MG/1; MG/1
1 TABLET ORAL EVERY 4 HOURS PRN
Status: DISCONTINUED | OUTPATIENT
Start: 2024-02-16 | End: 2024-02-16 | Stop reason: HOSPADM

## 2024-02-16 RX ORDER — MAGNESIUM HYDROXIDE 1200 MG/15ML
LIQUID ORAL AS NEEDED
Status: DISCONTINUED | OUTPATIENT
Start: 2024-02-16 | End: 2024-02-16 | Stop reason: HOSPADM

## 2024-02-16 RX ORDER — ONDANSETRON 2 MG/ML
4 INJECTION INTRAMUSCULAR; INTRAVENOUS EVERY 6 HOURS PRN
Status: DISCONTINUED | OUTPATIENT
Start: 2024-02-16 | End: 2024-02-16 | Stop reason: HOSPADM

## 2024-02-16 RX ORDER — ACETAMINOPHEN 10 MG/ML
1000 INJECTION, SOLUTION INTRAVENOUS ONCE
Qty: 100 ML | Refills: 0 | Status: COMPLETED | OUTPATIENT
Start: 2024-02-16 | End: 2024-02-16

## 2024-02-16 RX ORDER — BUPIVACAINE HYDROCHLORIDE 5 MG/ML
INJECTION, SOLUTION EPIDURAL; INTRACAUDAL AS NEEDED
Status: DISCONTINUED | OUTPATIENT
Start: 2024-02-16 | End: 2024-02-16 | Stop reason: HOSPADM

## 2024-02-16 RX ORDER — CEFAZOLIN SODIUM 1 G/50ML
1000 SOLUTION INTRAVENOUS ONCE
Status: COMPLETED | OUTPATIENT
Start: 2024-02-16 | End: 2024-02-16

## 2024-02-16 RX ORDER — PROPOFOL 10 MG/ML
INJECTION, EMULSION INTRAVENOUS CONTINUOUS PRN
Status: DISCONTINUED | OUTPATIENT
Start: 2024-02-16 | End: 2024-02-16

## 2024-02-16 RX ORDER — MIDAZOLAM HYDROCHLORIDE 2 MG/2ML
INJECTION, SOLUTION INTRAMUSCULAR; INTRAVENOUS AS NEEDED
Status: DISCONTINUED | OUTPATIENT
Start: 2024-02-16 | End: 2024-02-16

## 2024-02-16 RX ORDER — OXYCODONE HYDROCHLORIDE AND ACETAMINOPHEN 5; 325 MG/1; MG/1
1 TABLET ORAL EVERY 4 HOURS PRN
Qty: 20 TABLET | Refills: 0 | Status: SHIPPED | OUTPATIENT
Start: 2024-02-16

## 2024-02-16 RX ORDER — FENTANYL CITRATE/PF 50 MCG/ML
50 SYRINGE (ML) INJECTION
Status: DISCONTINUED | OUTPATIENT
Start: 2024-02-16 | End: 2024-02-16 | Stop reason: HOSPADM

## 2024-02-16 RX ADMIN — FENTANYL CITRATE 50 MCG: 50 INJECTION INTRAMUSCULAR; INTRAVENOUS at 14:28

## 2024-02-16 RX ADMIN — ONDANSETRON 4 MG: 2 INJECTION INTRAMUSCULAR; INTRAVENOUS at 13:08

## 2024-02-16 RX ADMIN — FENTANYL CITRATE 100 MCG: 50 INJECTION INTRAMUSCULAR; INTRAVENOUS at 12:59

## 2024-02-16 RX ADMIN — MIDAZOLAM 2 MG: 1 INJECTION INTRAMUSCULAR; INTRAVENOUS at 12:56

## 2024-02-16 RX ADMIN — DEXAMETHASONE SODIUM PHOSPHATE 5 MG: 10 INJECTION INTRAMUSCULAR; INTRAVENOUS at 13:08

## 2024-02-16 RX ADMIN — PROPOFOL 120 MCG/KG/MIN: 10 INJECTION, EMULSION INTRAVENOUS at 13:03

## 2024-02-16 RX ADMIN — SODIUM CHLORIDE 4 MCG: 9 INJECTION, SOLUTION INTRAVENOUS at 13:12

## 2024-02-16 RX ADMIN — SODIUM CHLORIDE 125 ML/HR: 0.9 INJECTION, SOLUTION INTRAVENOUS at 10:49

## 2024-02-16 RX ADMIN — PROPOFOL 50 MG: 10 INJECTION, EMULSION INTRAVENOUS at 13:03

## 2024-02-16 RX ADMIN — ACETAMINOPHEN 1000 MG: 10 INJECTION INTRAVENOUS at 12:46

## 2024-02-16 RX ADMIN — CEFAZOLIN SODIUM 1000 MG: 1 SOLUTION INTRAVENOUS at 13:00

## 2024-02-16 NOTE — DISCHARGE INSTR - AVS FIRST PAGE
[facility]  Dr. Galileo Carlos, DPM, FACFAS  Post-Operative Instructions    1. Take your prescribed medication as directed. You can take ibuprofen in between doses of the narcotic if breakthrough pain occurs  2. Upon arrival at home, lie down and elevate your surgical foot on 2 pillows. Unless you're moving from the couch, bed, or bathroom, make sure to elevate the foot. Swelling is not your friend.   3. Remain quiet, off your feet as much as possible, for the first 24-48 hours. This is when your feet first swell and may become painful. After 48 hours you may begin limited walking following these restrictions:   Nonweightbearinbg to surgical foot  4. Drink large quantities of water. Consume no alcohol. Continue a well-balanced diet.  5. Report any unusual discomfort or fever to this office.  6. A limited amount of discomfort and swelling is to be expected. In some cases the skin may take on a bruised appearance. The surgical solution that was applied to your foot prior to the operation is dark in color and the operation site may appear to be oozing when it actually is not.  7. A slight amount of blood is to be expected, and is no cause for alarm. Do not remove the dressings. If there is active bleeding and if the bleeding persists, add additional gauze to the bandage, apply direct pressure, elevate your feet and call this office.  8. Do not get the dressings wet. As regular bathing may be inconvenient, sponge baths are recommended. If you shower, keep the dressing dry.   9. When anesthesia wears off and if any discomfort should be present, apply an ice pack directly over the operated area for 15 minute intervals for several hours or until the pain leaves. (USE IN EXCESS OF 15 MINUTES COULD CAUSE FROSTBITE). Do not use hot water bags or electric pads. A convenient icepack can be made by placing ice cubes in a plastic bag and covering this with a towel.  10. If necessary, take a mild laxative before  retiring.  11. Wear your special boot anytime you put weight on your foot, even if it is just to walk to the bathroom and back. It will probably be a few weeks before you will be permitted to try regular shoes.  12. Having performed the operation, we are interested in a prompt recovery. Please cooperate by following the above instructions.  13. Please call to confirm your post-op appointment or call with any other questions.

## 2024-02-16 NOTE — OP NOTE
OPERATIVE REPORT - Podiatry  PATIENT NAME: Kinsey Lee    :  1990  MRN: [id number]  Pt Location: [facility] OR ROOM 02    SURGERY DATE: 2024    Surgeons and Role:     * Galileo Carlos DPM - Primary     * Ady Traore DPM - Assisting    Pre-op Diagnosis:  Hallux abducto valgus, right [M20.11]    Post-Op Diagnosis Codes:     * Hallux abducto valgus, right [M20.11]    Procedure(s) (LRB):  BUNIONECTOMY LAPIPLASTY (Right)    Specimen(s):  * No specimens in log *    Estimated Blood Loss:   Minimal    Drains:  * No LDAs found *    Anesthesia Type:   General/LMA with 20 ml of 1% Lidocaine and 0.5% Bupivacaine in a 1:1 mixture  20 ml 0.5% bupivacaine intra-op    Hemostasis:  Pneumatic ankle tourniquet  Electrocautery    Materials:  Implant Name Type Inv. Item Serial No.  Lot No. LRB No. Used Action   SPEEDPLATE 18 X 17MM  LAPIPLASTY - [id number]  SPEEDPLATE 18 X 17MM  LAPIPLASTY  [employer] [id number] Right 1 Implanted   SPEEDPLATE 18 X 14MM  LAPIPLASTY - [id number]  SPEEDPLATE 18 X 14MM  LAPIPLASTY  [employer] [id number] Right 1 Implanted   SCREW 4MM FT LNG LAPIPLASTY - [id number]  SCREW 4MM FT LNG LAPIPLASTY  [employer] [id number] Right 1 Implanted     Operative Findings:  Consistent with diagnosis    Complications:   None    Procedure and Technique:     Under mild sedation, the patient was brought into the operating room and placed on the operating room table in the supine position. IV sedation was achieved by anesthesia team and a universal timeout was performed where all parties are in agreement of correct patient, correct procedure and correct site. A pneumatic tourniquet was then placed over the patient's right lower extremity with ample padding. A local block was performed consisting of 20 ml of 1% Lidocaine and 0.5% Bupivacaine in a 1:1 mixture. The foot was then prepped and draped in the usual aseptic manner. An esmarch bandage was used to  exsangunate the foot and the pneumatic tourniquet was then inflated to 250mmHg.    Attention was directed to the right first metatarsal-medial cuneiform joint where an incision was carried down to bone reflecting all vital neural and vascular structures. The periosteum was longitudinally incised and reflected away medially and laterally from the underlying first tarsometatarsal joint. After obtaining adequate exposure an osteotome was used to release any capsule and plantar attachments. Using a joystick pin, a trial manual reduction of the intermetatarsal angle and planal rotation was performed noting excellent motion and position. Next using appropriate technique the Lapiplasty 3-in-1 fulcrum was placed at the base of the 1st and 2nd metatarsals. C-clamp applied. The C-clamp was tightened with 1st metatarsal in desired frontal plane motion. Position was confirmed on c-arm with noted reduction in IM angle, no metatarsus elevatus, and excellent position of sesamoids.     Next using a Lapiplasty cut guide, the cartilage from the 1st metatarsal base and the distal medial cuniform was removed with a sagittal saw via Lapiplasty technique to assure even coaptation. Multiple areas of aggressive bone fenestration was performed at the met and cuneiform. Next the joint was compressed with excellent compression as confirmed by c-arm. One K-wire served as compression across the joint line from M1 to C2. Next using proper Lapiplasty technique, one 2 hole speedplate was implanted on the medial aspect and one 2 hole speedplate was implanted on the dorsomedial aspect of the 1st TMTJ, with care to avoid adjacent joints. Correction and postioning of hardware confirmed on c-arm and noted to be excellent. Screw (as listed above) was inserted across K-wire using AO technique, wire was then removed.     Attention directed to 1st MTPJ where lateral release was made with a #15 blade and scissors, releasing fibular sesamoid ligament and  "adductor tendon; the toe was then in a more normal rectus position following lateral release. Longitudinal incision over 1st MTPJ, blunt dissection through subcutaneous tissue. Capsular incision, reflected medially and laterally to expose dorsomedial prominence. This was resected using sagittal saw.     The wounds were flushed with copious amounts of nss. All periosteal and capsular structures were closed using Vicryl. The subcutaneous tissues were closed using  Vicryl as well. Subcuticular layers were closed using Stratafix in a running fashion. Nylon used to close stab incisions. Steri-Strips were applied. 20 ml 0.5% bupivacaine administered to surgical site. Dressed with xeroform, 4x4 gauze, kerlix, ACE bandage.     The tourniquet was deflated at approximately 58 min and normal hyperemic response was noted to all digits. The patient tolerated the procedure and anesthesia well without immediate complications and transferred to PACU with vital signs stable.     Dr. Carlos was present during the entire procedure and participated in all key aspects.    SIGNATURE: Ady Traore DPM  DATE: February 16, 2024  TIME: 2:17 PM      Portions of the record may have been created with voice recognition software. Occasional wrong word or \"sound a like\" substitutions may have occurred due to the inherent limitations of voice recognition software. Read the chart carefully and recognize, using context, where substitutions have occurred.    "

## 2024-02-16 NOTE — INTERVAL H&P NOTE
H&P reviewed. After examining the patient I find no changes in the patients condition since the H&P had been written.    Vitals:    02/16/24 1057   BP: 107/52   Pulse: 64   Resp: 20   Temp: (!) 97.2 °F (36.2 °C)   SpO2: 98%

## 2024-02-16 NOTE — ANESTHESIA PREPROCEDURE EVALUATION
Procedure:  BUNIONECTOMY LAPIPLASTY (Right: Foot)    Relevant Problems   CARDIO   (+) Atypical chest pain      HEMATOLOGY   (+) Anemia      NEURO/PSYCH   (+) Anxiety and depression      PULMONARY   (+) Shortness of breath      Digestive   (+) CD (celiac disease)        Physical Exam    Airway    Mallampati score: I  TM Distance: >3 FB  Neck ROM: full     Dental   No notable dental hx     Cardiovascular  Rhythm: regular, Rate: normal, Cardiovascular exam normal    Pulmonary  Pulmonary exam normal Breath sounds clear to auscultation    Other Findings  post-pubertal.      Anesthesia Plan  ASA Score- 2     Anesthesia Type- general with ASA Monitors.         Additional Monitors:     Airway Plan: LMA.           Plan Factors-    Chart reviewed.   Existing labs reviewed. Patient summary reviewed.    Patient is not a current smoker. Patient not instructed to abstain from smoking on day of procedure. Patient did not smoke on day of surgery.            Induction- intravenous.    Postoperative Plan-     Informed Consent- Anesthetic plan and risks discussed with patient and spouse (Stephen).

## 2024-02-16 NOTE — DISCHARGE SUMMARY
Discharge Summary Outpatient Procedure Podiatry -   Kinsey Lee 33 y.o. female MRN: [id number]  Unit/Bed#: OR POOL Encounter: [id number]    Admission Date: 2/16/2024     Admitting Diagnosis: Hallux abducto valgus, right [M20.11]    Discharge Diagnosis: same    Procedures Performed: BUNIONECTOMY LAPIPLASTY: 95870 (CPT®)    Complications: none    Condition at Discharge: stable    Discharge instructions/Information to patient and family:   See after visit summary for information provided to patient and family.      Provisions for Follow-Up Care/Important appointments:  See after visit summary for information related to follow-up care and any pertinent home health orders.      Discharge Medications:  See after visit summary for reconciled discharge medications provided to patient and family.

## 2024-02-16 NOTE — ANESTHESIA POSTPROCEDURE EVALUATION
"Post-Op Assessment Note    CV Status:  Stable    Pain management: adequate       Mental Status:  Alert and awake   Hydration Status:  Euvolemic   PONV Controlled:  Controlled   Airway Patency:  Patent     Post Op Vitals Reviewed: Yes    No anethesia notable event occurred.    Staff: Anesthesiologist               BP      Temp      Pulse     Resp      SpO2      /55   Pulse 57   Temp 97.9 °F (36.6 °C) (Temporal)   Resp 16   Ht 5' 4\" (1.626 m)   Wt 67.9 kg (149 lb 11.1 oz)   LMP 02/05/2024 (Exact Date)   SpO2 98%   BMI 25.69 kg/m²     "

## 2024-02-19 ENCOUNTER — TELEPHONE (OUTPATIENT)
Age: 34
End: 2024-02-19

## 2024-02-19 NOTE — TELEPHONE ENCOUNTER
Caller: Kinsey    Doctor/Office: [facility]#: [phone]    Escalation: Appointment Patient has her first post op sched for Thursday she has no ride wants to know if she can come in on Wed.  She can come any time.  Thank you.

## 2024-02-21 ENCOUNTER — OFFICE VISIT (OUTPATIENT)
Dept: PODIATRY | Facility: CLINIC | Age: 34
End: 2024-02-21

## 2024-02-21 VITALS — RESPIRATION RATE: 18 BRPM | HEIGHT: 64 IN | BODY MASS INDEX: 25.69 KG/M2

## 2024-02-21 DIAGNOSIS — M21.611 BUNION, RIGHT: ICD-10-CM

## 2024-02-21 DIAGNOSIS — M20.11 HALLUX ABDUCTO VALGUS, RIGHT: Primary | ICD-10-CM

## 2024-02-21 PROBLEM — Z13.6 SCREENING FOR CARDIOVASCULAR CONDITION: Status: RESOLVED | Noted: 2020-11-01 | Resolved: 2024-02-21

## 2024-02-21 PROBLEM — Z13.0 SCREENING, ANEMIA, DEFICIENCY, IRON: Status: RESOLVED | Noted: 2020-11-01 | Resolved: 2024-02-21

## 2024-02-21 PROCEDURE — 99024 POSTOP FOLLOW-UP VISIT: CPT | Performed by: PODIATRIST

## 2024-02-21 NOTE — PROGRESS NOTES
Assessment/Plan:      Diagnoses and all orders for this visit:    Hallux abducto valgus, right  -     Cam Boot    Bunion, right  -     Cam Boot      Patient is stable postop 1 weeks    Incision: stable    Instructions given to patient. Rest the foot as much as possible and elevate/ice  if swollen.    Ambulatory status: light WB in CAM boot    Images reviewed today: none    RTC: 1 week. OVerall  she is stable.       Subjective:     Patient ID: Kinsey Lee is a 33 y.o. female.    SURGERY DATE: 2/16/2024     Surgeons and Role:     * Galileo Carlos DPM - Primary     * Ady Traore DPM - Assisting     Pre-op Diagnosis:  Hallux abducto valgus, right [M20.11]     Post-Op Diagnosis Codes:     * Hallux abducto valgus, right [M20.11]     Procedure(s) (LRB):  BUNIONECTOMY LAPIPLASTY (Right)            Review of Systems      Objective:     Physical Exam    First ray anatomic  Incision stable  N/V at baseline  NO calf pain  No acute concerns.

## 2024-02-21 NOTE — LETTER
February 21, 2024     Patient: Kinsey Lee  YOB: 1990  Date of Visit: 2/21/2024      To Whom it May Concern:    Kinsey Lee is under my professional care. Kinsey was seen in my office on 2/21/2024. Kinsey is healing from foot surgery. She is cleared for light duty at work.   She must sit with the foot in a CAM boot.   Allow to elevate her foot  NO standing more than 5 minutes at any given time.   She will have these restrictions for 5 more weeks.     If you have any questions or concerns, please don't hesitate to call.         Sincerely,          Galileo Carlos DPM        CC: No Recipients

## 2024-02-29 ENCOUNTER — OFFICE VISIT (OUTPATIENT)
Dept: PODIATRY | Facility: CLINIC | Age: 34
End: 2024-02-29

## 2024-02-29 VITALS — HEART RATE: 68 BPM | SYSTOLIC BLOOD PRESSURE: 95 MMHG | DIASTOLIC BLOOD PRESSURE: 65 MMHG

## 2024-02-29 DIAGNOSIS — M20.11 HALLUX ABDUCTO VALGUS, RIGHT: Primary | ICD-10-CM

## 2024-02-29 DIAGNOSIS — M21.611 BUNION, RIGHT: ICD-10-CM

## 2024-02-29 PROCEDURE — 99024 POSTOP FOLLOW-UP VISIT: CPT | Performed by: PODIATRIST

## 2024-02-29 NOTE — PROGRESS NOTES
Assessment/Plan:      Diagnoses and all orders for this visit:    Hallux abducto valgus, right    Bunion, right      Patient is stable postop 2 weeks    Incision: healed, sutures removed    Instructions given to patient. Rest the foot as much as possible and elevate/ice  if swollen.    Ambulatory status: light WB in CAM    Images reviewed today: none    RTC: serial XR 4 weeks. She is very stable at 2 weeks postop and doing well      Subjective:     Patient ID: Kinsey Lee is a 33 y.o. female.    SURGERY DATE: 2/16/2024     Surgeons and Role:     * Galileo Carlos DPM - Primary     * Ady Traore DPM - Assisting     Pre-op Diagnosis:  Hallux abducto valgus, right [M20.11]     Post-Op Diagnosis Codes:     * Hallux abducto valgus, right [M20.11]     Procedure(s) (LRB):  BUNIONECTOMY LAPIPLASTY (Right)        Review of Systems      Objective:     Physical Exam  Vitals reviewed.   Constitutional:       Appearance: She is not ill-appearing or diaphoretic.   Cardiovascular:      Rate and Rhythm: Normal rate.      Pulses: Normal pulses.   Pulmonary:      Effort: Pulmonary effort is normal. No respiratory distress.   Skin:     Capillary Refill: Capillary refill takes less than 2 seconds.      Findings: No erythema or rash.   Neurological:      Mental Status: She is alert and oriented to person, place, and time.      Sensory: No sensory deficit.      Gait: Gait normal.   Psychiatric:         Mood and Affect: Mood normal.      Bunion correction maintained.  Incision is healed.  No crepitus with range of motion of the great toe.  Extensor and flexor tendons intact in the forefoot and ankle.  No calf pain with compression.  Neurovascular status at baseline

## 2024-03-28 ENCOUNTER — OFFICE VISIT (OUTPATIENT)
Dept: PODIATRY | Facility: CLINIC | Age: 34
End: 2024-03-28

## 2024-03-28 ENCOUNTER — APPOINTMENT (OUTPATIENT)
Dept: RADIOLOGY | Age: 34
End: 2024-03-28
Payer: COMMERCIAL

## 2024-03-28 VITALS — DIASTOLIC BLOOD PRESSURE: 61 MMHG | SYSTOLIC BLOOD PRESSURE: 99 MMHG | HEART RATE: 75 BPM

## 2024-03-28 DIAGNOSIS — M20.11 HALLUX VALGUS OF RIGHT FOOT: Primary | ICD-10-CM

## 2024-03-28 DIAGNOSIS — M20.11 HALLUX VALGUS OF RIGHT FOOT: ICD-10-CM

## 2024-03-28 PROCEDURE — 99024 POSTOP FOLLOW-UP VISIT: CPT | Performed by: PODIATRIST

## 2024-03-28 PROCEDURE — 73630 X-RAY EXAM OF FOOT: CPT

## 2024-03-28 NOTE — LETTER
March 28, 2024     Patient: Kinsey Lee  YOB: 1990  Date of Visit: 3/28/2024      To Whom it May Concern:    Kinsey Lee is under my professional care. Kinsey was seen in my office on 3/28/2024. Kinsey is healing well from her foot surgery. She is transitioning from her boot to sneaker.     For 4 weeks she Is still on light duty but does not need the CAM boot  In 4 weeks she can resume full duty at work  .    If you have any questions or concerns, please don't hesitate to call.         Sincerely,          Galileo Carlos DPM        CC: No Recipients

## 2024-03-28 NOTE — PROGRESS NOTES
Assessment/Plan:      Diagnoses and all orders for this visit:    Hallux valgus of right foot  -     X-ray foot right 3+ views; Future      Patient is stable postop 6 weeks    Incision: healed    Instructions given to patient. Rest the foot as much as possible and elevate/ice  if swollen.    Ambulatory status: transition out of boot to sneaker, slowly increase activity    Images reviewed today: XRay 3 views of the right foot personally read by Dr. Carlos in office today and discussed with patient:    TMTJ is fused, hardware stable  First ray and MTPJ is anatomic  No concerns, stable postop 6 weeks      RTC: 6 weeks, she is healing well. Light duty for 2-4 weeks as she gets used to being in sneaker.       Subjective:     Patient ID: Kinsey Lee is a 33 y.o. female.    SURGERY DATE: 2/16/2024     Surgeons and Role:     * Galileo Carlos DPM - Primary     * Ady Traore DPM - Assisting     Pre-op Diagnosis:  Hallux abducto valgus, right [M20.11]     Post-Op Diagnosis Codes:     * Hallux abducto valgus, right [M20.11]     Procedure(s) (LRB):  BUNIONECTOMY LAPIPLASTY (Right)    She has no pain. She is 6 weeks postop          Review of Systems      Objective:     Physical Exam  Vitals reviewed.   Constitutional:       Appearance: She is not ill-appearing or diaphoretic.   Cardiovascular:      Rate and Rhythm: Normal rate.      Pulses: Normal pulses.   Pulmonary:      Effort: Pulmonary effort is normal. No respiratory distress.   Skin:     Capillary Refill: Capillary refill takes less than 2 seconds.      Findings: No erythema or rash.   Neurological:      Mental Status: She is alert and oriented to person, place, and time.      Sensory: No sensory deficit.      Gait: Gait normal.   Psychiatric:         Mood and Affect: Mood normal.       right foot first ray correction maintained  Expected edema  Incision is healed/stable  No MTPJ crepitus. No TMTJ pain.  EHL/FHL/TA intact.  Normal ankle ROM  No calf  pain with compression  Neurovascular status at baseline to the foot

## 2024-08-12 ENCOUNTER — APPOINTMENT (OUTPATIENT)
Dept: LAB | Age: 34
End: 2024-08-12

## 2024-08-12 DIAGNOSIS — Z00.8 HEALTH EXAMINATION IN POPULATION SURVEY: ICD-10-CM

## 2024-08-12 LAB
CHOLEST SERPL-MCNC: 128 MG/DL
EST. AVERAGE GLUCOSE BLD GHB EST-MCNC: 100 MG/DL
HBA1C MFR BLD: 5.1 %
HDLC SERPL-MCNC: 67 MG/DL
LDLC SERPL CALC-MCNC: 52 MG/DL (ref 0–100)
NONHDLC SERPL-MCNC: 61 MG/DL
TRIGL SERPL-MCNC: 47 MG/DL

## 2024-08-12 PROCEDURE — 36415 COLL VENOUS BLD VENIPUNCTURE: CPT

## 2024-08-12 PROCEDURE — 83036 HEMOGLOBIN GLYCOSYLATED A1C: CPT

## 2024-08-12 PROCEDURE — 80061 LIPID PANEL: CPT

## 2024-08-13 ENCOUNTER — OFFICE VISIT (OUTPATIENT)
Dept: GYNECOLOGY | Facility: CLINIC | Age: 34
End: 2024-08-13
Payer: COMMERCIAL

## 2024-08-13 VITALS
HEIGHT: 64 IN | DIASTOLIC BLOOD PRESSURE: 82 MMHG | SYSTOLIC BLOOD PRESSURE: 120 MMHG | BODY MASS INDEX: 22.53 KG/M2 | WEIGHT: 132 LBS

## 2024-08-13 DIAGNOSIS — Z01.419 ROUTINE GYNECOLOGICAL EXAMINATION: Primary | ICD-10-CM

## 2024-08-13 DIAGNOSIS — Z11.51 SCREENING FOR HPV (HUMAN PAPILLOMAVIRUS): ICD-10-CM

## 2024-08-13 PROCEDURE — G0476 HPV COMBO ASSAY CA SCREEN: HCPCS | Performed by: OBSTETRICS & GYNECOLOGY

## 2024-08-13 PROCEDURE — S0612 ANNUAL GYNECOLOGICAL EXAMINA: HCPCS | Performed by: OBSTETRICS & GYNECOLOGY

## 2024-08-13 PROCEDURE — G0145 SCR C/V CYTO,THINLAYER,RESCR: HCPCS | Performed by: OBSTETRICS & GYNECOLOGY

## 2024-08-13 NOTE — PROGRESS NOTES
"Ambulatory Visit  Name: Kinsey Lee      : 1990      MRN: [id number]  Encounter Provider: Ryan Kirby MD  Encounter Date: 2024   Encounter department: [facility] GYNECOLOGY [place]    Assessment & Plan     Normal breast and GYN exam  Normal Pap smear 2020  Regular menstrual cycles  Requesting having an IUD placed.    Plan: Recommend healthy diet and exercise.  Refer for IUD placement.  Check Pap smear.      1. Routine gynecological examination  -     Liquid-based pap, screening  2. Screening for HPV (human papillomavirus)  -     Liquid-based pap, screening      History of Present Illness G2, P2     Kinsey Lee is a 34 y.o. female who presents annual exam requesting an IUD placed.  Patient had an IUD in the past without any problems.  Has regular menstrual cycles.  Denies any abnormal bleeding pelvic pain breast bowel or bladder problems.  Presently not sexually active.  Family history.  Maternal grandmother and 2 maternal aunts (breast cancer).  Medications reviewed.  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  Eating healthy and exercising regularly.  Status post right bunionectomy 2024.  Objective     /82   Ht 5' 4\" (1.626 m)   Wt 59.9 kg (132 lb)   LMP 2024 (Exact Date)   BMI 22.66 kg/m²     Physical Exam  Vitals and nursing note reviewed.   Constitutional:       General: She is not in acute distress.     Appearance: She is well-developed.   HENT:      Head: Normocephalic and atraumatic.   Eyes:      Conjunctiva/sclera: Conjunctivae normal.   Cardiovascular:      Rate and Rhythm: Normal rate and regular rhythm.      Heart sounds: No murmur heard.  Pulmonary:      Effort: Pulmonary effort is normal. No respiratory distress.      Breath sounds: Normal breath sounds.   Chest:   Breasts:     Right: Normal.      Left: Normal.   Abdominal:      Palpations: Abdomen is soft.      Tenderness: There is no abdominal tenderness. "   Genitourinary:     Vagina: Normal.      Cervix: Normal.      Uterus: Normal.       Adnexa: Right adnexa normal and left adnexa normal.      Comments: External genitalia normal.  Normal urethra and Vici's glands.  No uterine prolapse cystocele or rectocele.  Musculoskeletal:         General: No swelling.      Cervical back: Neck supple.   Skin:     General: Skin is warm and dry.      Capillary Refill: Capillary refill takes less than 2 seconds.   Neurological:      Mental Status: She is alert.   Psychiatric:         Mood and Affect: Mood normal.       Administrative Statements

## 2024-08-15 LAB
HPV HR 12 DNA CVX QL NAA+PROBE: NEGATIVE
HPV16 DNA CVX QL NAA+PROBE: NEGATIVE
HPV18 DNA CVX QL NAA+PROBE: NEGATIVE

## 2024-08-21 LAB
LAB AP GYN PRIMARY INTERPRETATION: NORMAL
Lab: NORMAL

## 2024-09-08 NOTE — PROGRESS NOTES
IUD Procedure    Date/Time: 9/10/2024 1:18 PM    Performed by: Maeve Downs PA-C  Authorized by: Maeve Downs PA-C    Other Assisting Provider: No    Verbal consent obtained?: Yes    Written consent obtained?: Yes    Risks and benefits: Risks, benefits and alternatives were discussed    Consent given by:  Patient  Time Out:     Time out: Immediately prior to the procedure a time out was called    Patient states understanding of procedure being performed: Yes    Relevant documents present and verified: Yes    Required items: Required blood products, implants, devices and special equipment available    Patient identity confirmed:  Verbally with patient  Select procedure: IUD insertion    IUD Insertion:     Pelvic exam performed: yes      Negative GC/chlamydia test: no      Negative urine pregnancy test: yes      Negative serum pregnancy test: no      Cervix cleaned and prepped: yes      Speculum placed in vagina: yes      Tenaculum applied to cervix: no      Allis applied to cervix: yes      IUD inserted with no complications: yes      Strings trimmed: yes      Uterus sounded: yes      Uterus sound depth (cm):  7    IUD type:  Intrauterine copper  Post-procedure:     Patient tolerated procedure well: yes      Patient will follow up after next period: no    Insertion Comments:      Pt for IUD insertion today. Pt has already been previously counseled on all risks/benefits of IUD usage/insertion.  I again reviewed with pt risks of insertion including infection, perforation and expulsion. Reviewed with pt increased ectopic risk, migration, PID, infertility, high risk pregnancy if a pregnancy were to occur. I reviewed possible irregular menses and side effects of Mirena. We reviewed changes in menstrual cycles that may occur.   Pt aware 10 year coverage for birth control but can have it removed at any point if she desires.   Pt tolerated procedure well. Aware nothing PV x 48 hours. Will call with any cramping that  does not resolve, fevers/chills. Etc.

## 2024-09-10 ENCOUNTER — PROCEDURE VISIT (OUTPATIENT)
Dept: OBGYN CLINIC | Facility: CLINIC | Age: 34
End: 2024-09-10
Payer: COMMERCIAL

## 2024-09-10 VITALS — SYSTOLIC BLOOD PRESSURE: 102 MMHG | BODY MASS INDEX: 23 KG/M2 | WEIGHT: 134 LBS | DIASTOLIC BLOOD PRESSURE: 66 MMHG

## 2024-09-10 DIAGNOSIS — Z30.430 ENCOUNTER FOR IUD INSERTION: Primary | ICD-10-CM

## 2024-09-10 LAB — SL AMB POCT URINE HCG: NORMAL

## 2024-09-10 PROCEDURE — 58300 INSERT INTRAUTERINE DEVICE: CPT | Performed by: PHYSICIAN ASSISTANT

## 2024-09-10 PROCEDURE — 81025 URINE PREGNANCY TEST: CPT | Performed by: PHYSICIAN ASSISTANT

## 2024-09-10 RX ORDER — COPPER 313.4 MG/1
INTRAUTERINE DEVICE INTRAUTERINE
Status: COMPLETED | OUTPATIENT
Start: 2024-09-10 | End: 2024-09-10

## 2024-09-10 RX ADMIN — COPPER: 313.4 INTRAUTERINE DEVICE INTRAUTERINE at 13:18

## 2024-09-23 ENCOUNTER — OFFICE VISIT (OUTPATIENT)
Dept: INTERNAL MEDICINE CLINIC | Facility: CLINIC | Age: 34
End: 2024-09-23
Payer: COMMERCIAL

## 2024-09-23 VITALS
DIASTOLIC BLOOD PRESSURE: 68 MMHG | BODY MASS INDEX: 22.67 KG/M2 | WEIGHT: 132.8 LBS | OXYGEN SATURATION: 99 % | TEMPERATURE: 97.3 F | SYSTOLIC BLOOD PRESSURE: 100 MMHG | HEART RATE: 58 BPM | HEIGHT: 64 IN

## 2024-09-23 DIAGNOSIS — F41.9 ANXIETY AND DEPRESSION: Primary | ICD-10-CM

## 2024-09-23 DIAGNOSIS — F32.A ANXIETY AND DEPRESSION: Primary | ICD-10-CM

## 2024-09-23 PROCEDURE — 99214 OFFICE O/P EST MOD 30 MIN: CPT | Performed by: INTERNAL MEDICINE

## 2024-09-23 RX ORDER — ALPRAZOLAM 0.25 MG
0.25 TABLET ORAL 3 TIMES DAILY PRN
Qty: 50 TABLET | Refills: 0 | Status: SHIPPED | OUTPATIENT
Start: 2024-09-23

## 2024-09-23 NOTE — PROGRESS NOTES
Ambulatory Visit  Name: Kinsey Lee      : 1990      MRN: [id number]  Encounter Provider: Francisco Rodriguez MD  Encounter Date: 2024   [facility]    Assessment & Plan  Anxiety and depression  Patient ask about restarting Zoloft, discussed with patient Zoloft versus other potential treatment options.  Decision was made to restart Zoloft, prescription also given for alprazolam to use as needed for panic attacks, discussed that Zoloft may take a week or so to start working, and she may need more of the alprazolam while waiting for the Zoloft to start working    Orders:    sertraline (ZOLOFT) 50 mg tablet; Take 1 tablet (50 mg total) by mouth daily    ALPRAZolam (XANAX) 0.25 mg tablet; Take 1 tablet (0.25 mg total) by mouth 3 (three) times a day as needed for anxiety    Ambulatory referral to Psych Services; Future         History of Present Illness     I am seeing patient in coverage for an acute issue.  Pt going through a divorce and having a lot of anxiety, not sleeping well, easily goes to crying, having panic attacks.  Patient was on sertraline in the past with good results    Panic Attack  Pertinent negatives include no chest pain, chills or fever.     Review of Systems   Constitutional:  Negative for chills and fever.   Respiratory:  Negative for shortness of breath.    Cardiovascular:  Negative for chest pain.   Psychiatric/Behavioral:  Negative for suicidal ideas. The patient is nervous/anxious.      Past Medical History:   Diagnosis Date    Abnormal ECG     Anemia     Anxiety     Celiac disease 2007    Fibroadenoma of breast, right     resolved 2012    IUD contraception 2020    Paragard      Past Surgical History:   Procedure Laterality Date    BREAST BIOPSY      [place]OPH[place]OSCOPY      [place]    HI CORRJ HLX VLGS BNCTY SESMDC JOINT ARTHRODESIS Right 2024    Procedure: BUNIONECTOMY LAPIPLASTY;  Surgeon: Galileo Carlos,  "DPM;  Location: [facility] OR;  Service: Podiatry    WISDOM TOOTH EXTRACTION Bilateral 2011     Family History   Problem Relation Age of Onset    No Known Problems Mother     Aortic aneurysm Father     Learning disabilities Brother     No Known Problems Daughter     Breast cancer Maternal Grandmother     Diabetes Maternal Grandmother     No Known Problems Maternal Grandfather     Cancer Paternal Grandmother     Cancer Maternal Aunt     Breast cancer Maternal Aunt     Breast cancer Maternal Aunt     Mental retardation Maternal Uncle      Social History     Tobacco Use    Smoking status: Never    Smokeless tobacco: Never   Vaping Use    Vaping status: Never Used   Substance and Sexual Activity    Alcohol use: No    Drug use: No    Sexual activity: Yes     Partners: Male     Birth control/protection: Male Sterilization, Condom Male     No current outpatient medications on file prior to visit.     No Known Allergies  Immunization History   Administered Date(s) Administered    COVID-19 MODERNA VACC 0.5 ML IM 12/30/2020, 01/26/2021, 12/10/2021    Fluzone Split Quad 0.5 mL 09/21/2017    INFLUENZA 10/19/2016, 09/21/2017, 11/02/2019, 11/01/2021, 12/05/2022    Influenza Quadrivalent Preservative Free 3 years and older IM 09/21/2017    Influenza, seasonal, injectable 10/19/2016    Influenza, seasonal, injectable, preservative free 10/09/2020    Tdap 09/21/2017, 04/24/2020     Objective     /68 (BP Location: Left arm, Patient Position: Sitting, Cuff Size: Adult)   Pulse 58   Temp (!) 97.3 °F (36.3 °C) (Probe)   Ht 5' 4\" (1.626 m)   Wt 60.2 kg (132 lb 12.8 oz)   LMP 09/09/2024 (Exact Date)   SpO2 99%   BMI 22.80 kg/m²     Physical Exam  Cardiovascular:      Rate and Rhythm: Normal rate and regular rhythm.      Heart sounds: Normal heart sounds. No murmur heard.  Pulmonary:      Effort: Pulmonary effort is normal. No respiratory distress.      Breath sounds: No wheezing, rhonchi or rales.   Neurological:      Mental " Status: She is alert.   Psychiatric:      Comments: Easily tearful

## 2024-09-23 NOTE — ASSESSMENT & PLAN NOTE
Patient ask about restarting Zoloft, discussed with patient Zoloft versus other potential treatment options.  Decision was made to restart Zoloft, prescription also given for alprazolam to use as needed for panic attacks, discussed that Zoloft may take a week or so to start working, and she may need more of the alprazolam while waiting for the Zoloft to start working    Orders:    sertraline (ZOLOFT) 50 mg tablet; Take 1 tablet (50 mg total) by mouth daily    ALPRAZolam (XANAX) 0.25 mg tablet; Take 1 tablet (0.25 mg total) by mouth 3 (three) times a day as needed for anxiety    Ambulatory referral to Psych Services; Future

## 2024-09-23 NOTE — PATIENT INSTRUCTIONS
Problem List Items Addressed This Visit          Behavioral Health    Anxiety and depression - Primary     Patient ask about restarting Zoloft, discussed with patient Zoloft versus other potential treatment options.  Decision was made to restart Zoloft, prescription also given for alprazolam to use as needed for panic attacks, discussed that Zoloft may take a week or so to start working, and she may need more of the alprazolam while waiting for the Zoloft to start working         Relevant Medications    sertraline (ZOLOFT) 50 mg tablet    ALPRAZolam (XANAX) 0.25 mg tablet    Other Relevant Orders    Ambulatory referral to Psych Services

## 2024-09-24 ENCOUNTER — TELEPHONE (OUTPATIENT)
Age: 34
End: 2024-09-24

## 2024-09-24 NOTE — TELEPHONE ENCOUNTER
Writer attempted to contact pt regarding referral for [facility] to verify services needed and schedule an appt. Lvm to call writer back.

## 2024-09-30 NOTE — TELEPHONE ENCOUNTER
"Behavioral Health Integration Screening Questionnaire     Are you aware of the referred from your [facility] Provider  : Yes     Please advise interviewee that they need to answer all questions truthfully to allow for best care, and any misrepresentations of information may affect their ability to be seen at this clinic   => Was this discussed? Yes     If Minor Child (under age 18)    Who is/are the legal guardian(s) of the child?     Is there a custody agreement? No     If \"YES\"- Custody orders must be obtained prior to scheduling the first appointment  In addition, Consent to Treatment must be signed by all legal guardians prior to scheduling the first appointment    If \"NO\"- Consent to Treatment must be signed by all legal guardians prior to scheduling the first appointment    Behavioral Health Outpatient Intake History -     Presenting Problem (in patient's own words): Anxiety    Are there any communication barriers for this patient?     No                                               If yes, please describe barriers:       Are you taking any psychiatric medications? Yes     If \"YES\" -What are they Zoloft     If \"YES\" -Who prescribes?     Has the Patient abused alcohol or other substances in the last 6 months ? No  No concerns of substance abuse are reported.     If \"YES\" -What substance, How much, How often?     If illegal substance: Refer to [facility] (for [facility]) or [facility].   If Alcohol in excess of 10 drinks per week:  Refer to [facility] (for [facility]) or [facility]    ACCEPTED as a patient Yes  If \"Yes\" Appointment Date: 10/29/2024 at 11:00 am    Referred Elsewhere? No  If “Yes” - (Where? Ex: Therapy Anywhere; [facility];  [facility], etc.)       Name of Insurance Co: [facility]  Insurance ID#JUNAID [id number]  Insurance Phone # [phone]  If ins is primary or secondary? Primary  If patient is a minor, parents information such as Name, D.O.B of " guarantor.

## 2024-10-15 DIAGNOSIS — F41.9 ANXIETY AND DEPRESSION: ICD-10-CM

## 2024-10-15 DIAGNOSIS — F32.A ANXIETY AND DEPRESSION: ICD-10-CM

## 2024-10-29 ENCOUNTER — TELEPHONE (OUTPATIENT)
Age: 34
End: 2024-10-29

## 2024-10-29 ENCOUNTER — TELEPHONE (OUTPATIENT)
Dept: BEHAVIORAL/MENTAL HEALTH CLINIC | Facility: CLINIC | Age: 34
End: 2024-10-29

## 2024-10-29 NOTE — TELEPHONE ENCOUNTER
Patient has an apt with Jenny nixon the[facility]pa[facility] at 11 and wants to know if she can reschedule.  Please call patient back.    Apt is at [facility]    Thank you

## 2024-10-29 NOTE — TELEPHONE ENCOUNTER
Maria E from the primary care access pod called with the patient to find out if the patient could reschedule her appt that she thought was cancelled for  today. Writer checked the chart and informed her the appt is still scheduled.Patient is scheduled to see the provider for 11 am virtually.

## 2024-11-02 ENCOUNTER — HOSPITAL ENCOUNTER (OUTPATIENT)
Dept: RADIOLOGY | Facility: HOSPITAL | Age: 34
Discharge: HOME | End: 2024-11-02
Payer: COMMERCIAL

## 2024-11-02 ENCOUNTER — TELEPHONE (OUTPATIENT)
Dept: EMERGENCY DEPT | Facility: HOSPITAL | Age: 34
End: 2024-11-02

## 2024-11-02 DIAGNOSIS — S99.921A RIGHT FOOT INJURY: Primary | ICD-10-CM

## 2024-11-02 DIAGNOSIS — S99.921A RIGHT FOOT INJURY: ICD-10-CM

## 2024-11-02 PROCEDURE — 73630 X-RAY EXAM OF FOOT: CPT

## 2025-03-08 DIAGNOSIS — F41.9 ANXIETY AND DEPRESSION: ICD-10-CM

## 2025-03-08 DIAGNOSIS — F32.A ANXIETY AND DEPRESSION: ICD-10-CM

## 2025-06-19 DIAGNOSIS — F32.A ANXIETY AND DEPRESSION: ICD-10-CM

## 2025-06-19 DIAGNOSIS — F41.9 ANXIETY AND DEPRESSION: ICD-10-CM

## (undated) DEVICE — 10FR FRAZIER SUCTION HANDLE: Brand: CARDINAL HEALTH

## (undated) DEVICE — NEEDLE 18 G X 1 1/2

## (undated) DEVICE — BLADE SAGITTAL 25.6 X 9.5MM

## (undated) DEVICE — SUT STRATAFIX SPIRAL MONOCRYL PLUS 4-0 PS-2 30CM SXMP1B117

## (undated) DEVICE — COBAN 4 IN STERILE

## (undated) DEVICE — INTENDED FOR TISSUE SEPARATION, AND OTHER PROCEDURES THAT REQUIRE A SHARP SURGICAL BLADE TO PUNCTURE OR CUT.: Brand: BARD-PARKER ® CARBON RIB-BACK BLADES

## (undated) DEVICE — SUT VICRYL 3-0 PS-2 27 IN J427H

## (undated) DEVICE — GLOVE SRG BIOGEL 7.5

## (undated) DEVICE — 3M™ STERI-STRIP™ REINFORCED ADHESIVE SKIN CLOSURES, R1547, 1/2 IN X 4 IN (12 MM X 100 MM), 6 STRIPS/ENVELOPE: Brand: 3M™ STERI-STRIP™

## (undated) DEVICE — BETHLEHEM UNIVERSAL  MIONR EXT: Brand: CARDINAL HEALTH

## (undated) DEVICE — OCCLUSIVE GAUZE STRIP,3% BISMUTH TRIBROMOPHENATE IN PETROLATUM BLEND: Brand: XEROFORM

## (undated) DEVICE — SCD SEQUENTIAL COMPRESSION COMFORT SLEEVE MEDIUM KNEE LENGTH: Brand: KENDALL SCD

## (undated) DEVICE — DRAPE C-ARMOUR

## (undated) DEVICE — CHLORAPREP HI-LITE 26ML ORANGE

## (undated) DEVICE — SYRINGE 10ML LL

## (undated) DEVICE — PREP PAD BNS: Brand: CONVERTORS

## (undated) DEVICE — GAUZE SPONGES,16 PLY: Brand: CURITY

## (undated) DEVICE — BLADE SAW 11 X 40MM LAPIPLASTY LINVATEC

## (undated) DEVICE — SUT VICRYL 4-0 PS-2 27 IN J426H

## (undated) DEVICE — 2000CC GUARDIAN II: Brand: GUARDIAN

## (undated) DEVICE — CAST PADDING 4 IN SYNTHETIC NON-STRL

## (undated) DEVICE — GLOVE INDICATOR PI UNDERGLOVE SZ 7.5 BLUE

## (undated) DEVICE — CUFF TOURNIQUET 18 X 4 IN QUICK CONNECT DISP 1 BLADDER

## (undated) DEVICE — PLUMEPEN PRO 10FT

## (undated) DEVICE — SUT VICRYL 2-0 REEL 54 IN J286G

## (undated) DEVICE — CURITY STRETCH BANDAGE: Brand: CURITY

## (undated) DEVICE — ACE WRAP 4 IN UNSTERILE

## (undated) DEVICE — NEEDLE 25G X 1 1/2